# Patient Record
Sex: FEMALE | URBAN - NONMETROPOLITAN AREA
[De-identification: names, ages, dates, MRNs, and addresses within clinical notes are randomized per-mention and may not be internally consistent; named-entity substitution may affect disease eponyms.]

---

## 2017-04-05 ENCOUNTER — APPOINTMENT (RX ONLY)
Dept: URBAN - NONMETROPOLITAN AREA CLINIC 1 | Facility: CLINIC | Age: 82
Setting detail: DERMATOLOGY
End: 2017-04-05

## 2017-04-05 VITALS — WEIGHT: 125 LBS | HEIGHT: 63 IN

## 2017-04-05 DIAGNOSIS — L82.1 OTHER SEBORRHEIC KERATOSIS: ICD-10-CM

## 2017-04-05 DIAGNOSIS — B35.1 TINEA UNGUIUM: ICD-10-CM

## 2017-04-05 DIAGNOSIS — L82.0 INFLAMED SEBORRHEIC KERATOSIS: ICD-10-CM

## 2017-04-05 DIAGNOSIS — D18.0 HEMANGIOMA: ICD-10-CM

## 2017-04-05 PROBLEM — D18.01 HEMANGIOMA OF SKIN AND SUBCUTANEOUS TISSUE: Status: ACTIVE | Noted: 2017-04-05

## 2017-04-05 PROBLEM — K75.9 INFLAMMATORY LIVER DISEASE, UNSPECIFIED: Status: ACTIVE | Noted: 2017-04-05

## 2017-04-05 PROBLEM — M12.9 ARTHROPATHY, UNSPECIFIED: Status: ACTIVE | Noted: 2017-04-05

## 2017-04-05 PROBLEM — F32.9 MAJOR DEPRESSIVE DISORDER, SINGLE EPISODE, UNSPECIFIED: Status: ACTIVE | Noted: 2017-04-05

## 2017-04-05 PROBLEM — J45.909 UNSPECIFIED ASTHMA, UNCOMPLICATED: Status: ACTIVE | Noted: 2017-04-05

## 2017-04-05 PROBLEM — Z85.3 PERSONAL HISTORY OF MALIGNANT NEOPLASM OF BREAST: Status: ACTIVE | Noted: 2017-04-05

## 2017-04-05 PROCEDURE — 17111 DESTRUCTION B9 LESIONS 15/>: CPT

## 2017-04-05 PROCEDURE — ? COUNSELING

## 2017-04-05 PROCEDURE — 99203 OFFICE O/P NEW LOW 30 MIN: CPT | Mod: 25

## 2017-04-05 PROCEDURE — ? LIQUID NITROGEN

## 2017-04-05 PROCEDURE — ? PRESCRIPTION

## 2017-04-05 RX ORDER — CICLOPIROX 80 MG/ML
SOLUTION TOPICAL QHS
Qty: 6 | Refills: 3 | Status: ERX | COMMUNITY
Start: 2017-04-05

## 2017-04-05 RX ADMIN — CICLOPIROX 1 APPLICATION: 80 SOLUTION TOPICAL at 00:00

## 2017-04-05 ASSESSMENT — LOCATION DETAILED DESCRIPTION DERM
LOCATION DETAILED: RIGHT MID-UPPER BACK
LOCATION DETAILED: LEFT MID-UPPER BACK
LOCATION DETAILED: RIGHT DISTAL CALF
LOCATION DETAILED: RIGHT ANTERIOR DISTAL THIGH
LOCATION DETAILED: RIGHT INFERIOR MEDIAL MIDBACK
LOCATION DETAILED: RIGHT ANTERIOR PROXIMAL THIGH
LOCATION DETAILED: RIGHT PROXIMAL CALF
LOCATION DETAILED: RIGHT DISTAL POSTERIOR UPPER ARM
LOCATION DETAILED: LEFT ANTERIOR MEDIAL PROXIMAL THIGH
LOCATION DETAILED: LEFT ANTERIOR PROXIMAL THIGH
LOCATION DETAILED: LEFT SUPERIOR UPPER BACK
LOCATION DETAILED: LEFT PROXIMAL CALF
LOCATION DETAILED: INFERIOR LUMBAR SPINE
LOCATION DETAILED: LEFT ANTERIOR SHOULDER
LOCATION DETAILED: RIGHT GREAT TOE
LOCATION DETAILED: RIGHT ELBOW
LOCATION DETAILED: RIGHT SUPERIOR MEDIAL UPPER BACK
LOCATION DETAILED: LEFT POSTERIOR SHOULDER
LOCATION DETAILED: RIGHT PROXIMAL POSTERIOR UPPER ARM
LOCATION DETAILED: LEFT PROXIMAL POSTERIOR UPPER ARM
LOCATION DETAILED: LEFT POPLITEAL SKIN
LOCATION DETAILED: RIGHT INFERIOR UPPER BACK
LOCATION DETAILED: RIGHT INFERIOR MEDIAL UPPER BACK
LOCATION DETAILED: RIGHT PROXIMAL DORSAL FOREARM

## 2017-04-05 ASSESSMENT — LOCATION SIMPLE DESCRIPTION DERM
LOCATION SIMPLE: RIGHT ELBOW
LOCATION SIMPLE: RIGHT POSTERIOR UPPER ARM
LOCATION SIMPLE: RIGHT CALF
LOCATION SIMPLE: RIGHT FOREARM
LOCATION SIMPLE: LEFT SHOULDER
LOCATION SIMPLE: LEFT UPPER BACK
LOCATION SIMPLE: RIGHT THIGH
LOCATION SIMPLE: LEFT CALF
LOCATION SIMPLE: RIGHT UPPER BACK
LOCATION SIMPLE: LEFT POPLITEAL SKIN
LOCATION SIMPLE: LOWER BACK
LOCATION SIMPLE: RIGHT GREAT TOE
LOCATION SIMPLE: LEFT POSTERIOR UPPER ARM
LOCATION SIMPLE: RIGHT LOWER BACK
LOCATION SIMPLE: LEFT THIGH

## 2017-04-05 ASSESSMENT — LOCATION ZONE DERM
LOCATION ZONE: TRUNK
LOCATION ZONE: TOE
LOCATION ZONE: ARM
LOCATION ZONE: LEG

## 2017-04-05 NOTE — PROCEDURE: MIPS QUALITY
Detail Level: Generalized
Quality 110: Preventive Care And Screening: Influenza Immunization: Influenza Immunization previously received during influenza season
Quality 111:Pneumonia Vaccination Status For Older Adults: Pneumococcal Vaccination Administered

## 2017-04-05 NOTE — PROCEDURE: LIQUID NITROGEN
Post-Care Instructions: I reviewed with the patient in detail post-care instructions. Patient is to wear sunprotection, and avoid picking at any of the treated lesions. Pt may apply Vaseline to crusted or scabbing areas.
Include Z78.9 (Other Specified Conditions Influencing Health Status) As An Associated Diagnosis?: Yes
Medical Necessity Information: It is in your best interest to select a reason for this procedure from the list below. All of these items fulfill various CMS LCD requirements except the new and changing color options.
Number Of Freeze-Thaw Cycles: 2 freeze-thaw cycles
Detail Level: Simple
Consent: The patient's consent was obtained including but not limited to risks of crusting, scabbing, blistering, scarring, darker or lighter pigmentary change, recurrence, incomplete removal and infection.
Medical Necessity Clause: This procedure was medically necessary because the lesions that were treated were:

## 2017-04-06 RX ORDER — CICLOPIROX 80 MG/ML
SOLUTION TOPICAL QHS
Qty: 6 | Refills: 3 | Status: ERX

## 2017-12-11 PROBLEM — M48.02 CERVICAL SPINAL STENOSIS: Status: ACTIVE | Noted: 2017-12-11

## 2017-12-11 PROBLEM — M54.2 NECK PAIN: Status: ACTIVE | Noted: 2017-12-11

## 2018-01-04 ENCOUNTER — APPOINTMENT (RX ONLY)
Dept: URBAN - NONMETROPOLITAN AREA CLINIC 1 | Facility: CLINIC | Age: 83
Setting detail: DERMATOLOGY
End: 2018-01-04

## 2018-01-04 DIAGNOSIS — I87.2 VENOUS INSUFFICIENCY (CHRONIC) (PERIPHERAL): ICD-10-CM

## 2018-01-04 DIAGNOSIS — L82.0 INFLAMED SEBORRHEIC KERATOSIS: ICD-10-CM

## 2018-01-04 PROBLEM — I83.11 VARICOSE VEINS OF RIGHT LOWER EXTREMITY WITH INFLAMMATION: Status: ACTIVE | Noted: 2018-01-04

## 2018-01-04 PROBLEM — L55.1 SUNBURN OF SECOND DEGREE: Status: ACTIVE | Noted: 2018-01-04

## 2018-01-04 PROCEDURE — 99213 OFFICE O/P EST LOW 20 MIN: CPT | Mod: 25

## 2018-01-04 PROCEDURE — ? COUNSELING

## 2018-01-04 PROCEDURE — 17111 DESTRUCTION B9 LESIONS 15/>: CPT

## 2018-01-04 PROCEDURE — ? PRESCRIPTION

## 2018-01-04 PROCEDURE — ? LIQUID NITROGEN

## 2018-01-04 RX ORDER — TRIAMCINOLONE ACETONIDE 1 MG/G
CREAM TOPICAL BID
Qty: 1 | Refills: 0 | Status: ERX | COMMUNITY
Start: 2018-01-04

## 2018-01-04 RX ADMIN — TRIAMCINOLONE ACETONIDE 1 APPLICATION: 1 CREAM TOPICAL at 00:00

## 2018-01-04 ASSESSMENT — LOCATION ZONE DERM
LOCATION ZONE: LEG
LOCATION ZONE: NECK
LOCATION ZONE: TRUNK
LOCATION ZONE: FACE

## 2018-01-04 ASSESSMENT — LOCATION DETAILED DESCRIPTION DERM
LOCATION DETAILED: RIGHT PROXIMAL PRETIBIAL REGION
LOCATION DETAILED: RIGHT CENTRAL LATERAL NECK
LOCATION DETAILED: RIGHT PROXIMAL LATERAL POSTERIOR THIGH
LOCATION DETAILED: LEFT ANTERIOR PROXIMAL THIGH
LOCATION DETAILED: LEFT PROXIMAL POSTERIOR THIGH
LOCATION DETAILED: RIGHT ANTERIOR PROXIMAL THIGH
LOCATION DETAILED: LEFT SUPERIOR UPPER BACK
LOCATION DETAILED: RIGHT LATERAL UPPER BACK
LOCATION DETAILED: LEFT INFERIOR UPPER BACK
LOCATION DETAILED: LEFT DISTAL POSTERIOR THIGH
LOCATION DETAILED: RIGHT MID-UPPER BACK
LOCATION DETAILED: LEFT ANTERIOR LATERAL DISTAL THIGH
LOCATION DETAILED: RIGHT SUPERIOR LATERAL MIDBACK
LOCATION DETAILED: LEFT ANTERIOR LATERAL PROXIMAL THIGH
LOCATION DETAILED: LEFT MID-UPPER BACK
LOCATION DETAILED: LEFT LATERAL UPPER BACK
LOCATION DETAILED: RIGHT PROXIMAL CALF
LOCATION DETAILED: RIGHT SUPERIOR LATERAL NECK
LOCATION DETAILED: RIGHT ANTERIOR DISTAL THIGH
LOCATION DETAILED: RIGHT DISTAL LATERAL POSTERIOR THIGH
LOCATION DETAILED: RIGHT DISTAL CALF
LOCATION DETAILED: RIGHT PROXIMAL POSTERIOR THIGH
LOCATION DETAILED: RIGHT INFERIOR LATERAL UPPER BACK
LOCATION DETAILED: LEFT LATERAL ABDOMEN
LOCATION DETAILED: RIGHT CENTRAL TEMPLE

## 2018-01-04 ASSESSMENT — LOCATION SIMPLE DESCRIPTION DERM
LOCATION SIMPLE: ABDOMEN
LOCATION SIMPLE: RIGHT POSTERIOR THIGH
LOCATION SIMPLE: RIGHT THIGH
LOCATION SIMPLE: RIGHT TEMPLE
LOCATION SIMPLE: LEFT POSTERIOR THIGH
LOCATION SIMPLE: RIGHT PRETIBIAL REGION
LOCATION SIMPLE: LEFT THIGH
LOCATION SIMPLE: NECK
LOCATION SIMPLE: RIGHT LOWER BACK
LOCATION SIMPLE: LEFT UPPER BACK
LOCATION SIMPLE: RIGHT CALF
LOCATION SIMPLE: RIGHT UPPER BACK

## 2018-01-04 ASSESSMENT — PAIN INTENSITY VAS: HOW INTENSE IS YOUR PAIN 0 BEING NO PAIN, 10 BEING THE MOST SEVERE PAIN POSSIBLE?: 6/10 PAIN

## 2018-01-04 NOTE — PROCEDURE: LIQUID NITROGEN
Post-Care Instructions: I reviewed with the patient in detail post-care instructions. Patient is to wear sunprotection, and avoid picking at any of the treated lesions. Pt may apply Vaseline to crusted or scabbing areas.
Include Z78.9 (Other Specified Conditions Influencing Health Status) As An Associated Diagnosis?: Yes
Add 52 Modifier (Optional): no
Medical Necessity Clause: This procedure was medically necessary because the lesions that were treated were: not resolving
Medical Necessity Information: It is in your best interest to select a reason for this procedure from the list below. All of these items fulfill various CMS LCD requirements except the new and changing color options.
Number Of Freeze-Thaw Cycles: 2 freeze-thaw cycles
Detail Level: Simple
Consent: The patient's consent was obtained including but not limited to risks of crusting, scabbing, blistering, scarring, darker or lighter pigmentary change, recurrence, incomplete removal and infection.

## 2018-01-04 NOTE — HPI: BODY LOCATION - LEG
How Severe Is Your Condition?: moderate
Additional History: Patient states she went off all pain meds in November 2017 . She is using Dernamend (a  moisturizing bruise formula), a  Muscle therapy cream with arnica and Aloe Vera gel.
Year Removed: 1900

## 2018-01-23 PROBLEM — S20.219A RIB CONTUSION: Status: ACTIVE | Noted: 2018-01-23

## 2018-03-20 PROBLEM — M47.812 SPONDYLOSIS OF CERVICAL REGION WITHOUT MYELOPATHY OR RADICULOPATHY: Status: ACTIVE | Noted: 2018-03-20

## 2018-08-21 PROBLEM — M46.1 SACROILIITIS (HCC): Status: ACTIVE | Noted: 2018-08-21

## 2018-11-14 ENCOUNTER — APPOINTMENT (RX ONLY)
Dept: URBAN - METROPOLITAN AREA CLINIC 38 | Facility: CLINIC | Age: 83
Setting detail: DERMATOLOGY
End: 2018-11-14

## 2018-11-14 DIAGNOSIS — L81.4 OTHER MELANIN HYPERPIGMENTATION: ICD-10-CM

## 2018-11-14 DIAGNOSIS — I87.2 VENOUS INSUFFICIENCY (CHRONIC) (PERIPHERAL): ICD-10-CM

## 2018-11-14 PROCEDURE — ? COUNSELING

## 2018-11-14 PROCEDURE — 99203 OFFICE O/P NEW LOW 30 MIN: CPT

## 2018-11-14 RX ORDER — TRIAMCINOLONE ACETONIDE 1 MG/G
CREAM TOPICAL BID
Qty: 1 | Refills: 0 | Status: ERX

## 2018-11-14 ASSESSMENT — LOCATION DETAILED DESCRIPTION DERM
LOCATION DETAILED: LEFT DISTAL PRETIBIAL REGION
LOCATION DETAILED: LEFT PROXIMAL PRETIBIAL REGION
LOCATION DETAILED: RIGHT DISTAL PRETIBIAL REGION

## 2018-11-14 ASSESSMENT — LOCATION SIMPLE DESCRIPTION DERM
LOCATION SIMPLE: RIGHT PRETIBIAL REGION
LOCATION SIMPLE: LEFT PRETIBIAL REGION

## 2018-11-14 ASSESSMENT — LOCATION ZONE DERM: LOCATION ZONE: LEG

## 2021-01-14 DIAGNOSIS — Z23 NEED FOR VACCINATION: ICD-10-CM

## 2023-02-22 ENCOUNTER — APPOINTMENT (OUTPATIENT)
Dept: RADIOLOGY | Facility: MEDICAL CENTER | Age: 88
DRG: 871 | End: 2023-02-22
Attending: EMERGENCY MEDICINE
Payer: MEDICARE

## 2023-02-22 ENCOUNTER — HOSPITAL ENCOUNTER (INPATIENT)
Facility: MEDICAL CENTER | Age: 88
LOS: 6 days | DRG: 871 | End: 2023-02-28
Attending: EMERGENCY MEDICINE | Admitting: HOSPITALIST
Payer: MEDICARE

## 2023-02-22 DIAGNOSIS — J18.9 SEPSIS DUE TO PNEUMONIA (HCC): ICD-10-CM

## 2023-02-22 DIAGNOSIS — A41.9 SEPSIS WITHOUT ACUTE ORGAN DYSFUNCTION, DUE TO UNSPECIFIED ORGANISM (HCC): ICD-10-CM

## 2023-02-22 DIAGNOSIS — N30.00 ACUTE CYSTITIS WITHOUT HEMATURIA: ICD-10-CM

## 2023-02-22 DIAGNOSIS — A41.9 SEPSIS DUE TO PNEUMONIA (HCC): ICD-10-CM

## 2023-02-22 DIAGNOSIS — R78.81 E COLI BACTEREMIA: ICD-10-CM

## 2023-02-22 DIAGNOSIS — R41.89 COGNITIVE DECLINE: ICD-10-CM

## 2023-02-22 DIAGNOSIS — B96.20 E COLI BACTEREMIA: ICD-10-CM

## 2023-02-22 PROBLEM — R74.01 ELEVATED AST (SGOT): Status: ACTIVE | Noted: 2023-02-22

## 2023-02-22 PROBLEM — D69.6 THROMBOCYTOPENIA (HCC): Status: ACTIVE | Noted: 2023-02-22

## 2023-02-22 PROBLEM — N39.0 UTI (URINARY TRACT INFECTION): Status: ACTIVE | Noted: 2023-02-22

## 2023-02-22 PROBLEM — M54.9 CHRONIC BACK PAIN: Status: ACTIVE | Noted: 2023-02-22

## 2023-02-22 PROBLEM — G89.29 CHRONIC BACK PAIN: Status: ACTIVE | Noted: 2023-02-22

## 2023-02-22 LAB
ALBUMIN SERPL BCP-MCNC: 3.6 G/DL (ref 3.2–4.9)
ALBUMIN/GLOB SERPL: 1.2 G/DL
ALP SERPL-CCNC: 92 U/L (ref 30–99)
ALT SERPL-CCNC: 26 U/L (ref 2–50)
AMMONIA PLAS-SCNC: 16 UMOL/L (ref 11–45)
ANION GAP SERPL CALC-SCNC: 14 MMOL/L (ref 7–16)
ANISOCYTOSIS BLD QL SMEAR: ABNORMAL
APPEARANCE UR: ABNORMAL
AST SERPL-CCNC: 68 U/L (ref 12–45)
BACTERIA #/AREA URNS HPF: ABNORMAL /HPF
BASOPHILS # BLD AUTO: 0 % (ref 0–1.8)
BASOPHILS # BLD: 0 K/UL (ref 0–0.12)
BILIRUB SERPL-MCNC: 1.4 MG/DL (ref 0.1–1.5)
BILIRUB UR QL STRIP.AUTO: NEGATIVE
BUN SERPL-MCNC: 35 MG/DL (ref 8–22)
CALCIUM ALBUM COR SERPL-MCNC: 9.2 MG/DL (ref 8.5–10.5)
CALCIUM SERPL-MCNC: 8.9 MG/DL (ref 8.5–10.5)
CHLORIDE SERPL-SCNC: 105 MMOL/L (ref 96–112)
CO2 SERPL-SCNC: 23 MMOL/L (ref 20–33)
COLOR UR: ABNORMAL
CREAT SERPL-MCNC: 1.09 MG/DL (ref 0.5–1.4)
EKG IMPRESSION: NORMAL
EOSINOPHIL # BLD AUTO: 0 K/UL (ref 0–0.51)
EOSINOPHIL NFR BLD: 0 % (ref 0–6.9)
EPI CELLS #/AREA URNS HPF: ABNORMAL /HPF
ERYTHROCYTE [DISTWIDTH] IN BLOOD BY AUTOMATED COUNT: 50 FL (ref 35.9–50)
FLUAV RNA SPEC QL NAA+PROBE: NEGATIVE
FLUBV RNA SPEC QL NAA+PROBE: NEGATIVE
GFR SERPLBLD CREATININE-BSD FMLA CKD-EPI: 49 ML/MIN/1.73 M 2
GLOBULIN SER CALC-MCNC: 2.9 G/DL (ref 1.9–3.5)
GLUCOSE SERPL-MCNC: 151 MG/DL (ref 65–99)
GLUCOSE UR STRIP.AUTO-MCNC: NEGATIVE MG/DL
HCT VFR BLD AUTO: 48.2 % (ref 37–47)
HGB BLD-MCNC: 15.8 G/DL (ref 12–16)
HYALINE CASTS #/AREA URNS LPF: ABNORMAL /LPF
INR PPP: 1.47 (ref 0.87–1.13)
KETONES UR STRIP.AUTO-MCNC: ABNORMAL MG/DL
LACTATE SERPL-SCNC: 2.8 MMOL/L (ref 0.5–2)
LACTATE SERPL-SCNC: 3.5 MMOL/L (ref 0.5–2)
LACTATE SERPL-SCNC: 3.8 MMOL/L (ref 0.5–2)
LACTATE SERPL-SCNC: 3.8 MMOL/L (ref 0.5–2)
LEUKOCYTE ESTERASE UR QL STRIP.AUTO: ABNORMAL
LG PLATELETS BLD QL SMEAR: NORMAL
LYMPHOCYTES # BLD AUTO: 0.95 K/UL (ref 1–4.8)
LYMPHOCYTES NFR BLD: 6.8 % (ref 22–41)
MACROCYTES BLD QL SMEAR: ABNORMAL
MANUAL DIFF BLD: ABNORMAL
MCH RBC QN AUTO: 31.7 PG (ref 27–33)
MCHC RBC AUTO-ENTMCNC: 32.8 G/DL (ref 33.6–35)
MCV RBC AUTO: 96.6 FL (ref 81.4–97.8)
METAMYELOCYTES NFR BLD MANUAL: 0.9 %
MICRO URNS: ABNORMAL
MONOCYTES # BLD AUTO: 0.47 K/UL (ref 0–0.85)
MONOCYTES NFR BLD AUTO: 3.4 % (ref 0–13.4)
MORPHOLOGY BLD-IMP: NORMAL
NEUTROPHILS # BLD AUTO: 12.36 K/UL (ref 2–7.15)
NEUTROPHILS NFR BLD: 78.6 % (ref 44–72)
NEUTS BAND NFR BLD MANUAL: 10.3 % (ref 0–10)
NITRITE UR QL STRIP.AUTO: NEGATIVE
NRBC # BLD AUTO: 0 K/UL
NRBC BLD-RTO: 0 /100 WBC
PH UR STRIP.AUTO: 5.5 [PH] (ref 5–8)
PLATELET # BLD AUTO: 131 K/UL (ref 164–446)
PLATELET BLD QL SMEAR: NORMAL
PMV BLD AUTO: 12 FL (ref 9–12.9)
POLYCHROMASIA BLD QL SMEAR: NORMAL
POTASSIUM SERPL-SCNC: 3.7 MMOL/L (ref 3.6–5.5)
PROCALCITONIN SERPL-MCNC: 60.5 NG/ML
PROT SERPL-MCNC: 6.5 G/DL (ref 6–8.2)
PROT UR QL STRIP: 300 MG/DL
PROTHROMBIN TIME: 17.6 SEC (ref 12–14.6)
RBC # BLD AUTO: 4.99 M/UL (ref 4.2–5.4)
RBC # URNS HPF: ABNORMAL /HPF
RBC BLD AUTO: PRESENT
RBC UR QL AUTO: ABNORMAL
RSV RNA SPEC QL NAA+PROBE: NEGATIVE
SARS-COV-2 RNA RESP QL NAA+PROBE: NOTDETECTED
SODIUM SERPL-SCNC: 142 MMOL/L (ref 135–145)
SP GR UR STRIP.AUTO: 1.02
SPECIMEN SOURCE: NORMAL
UROBILINOGEN UR STRIP.AUTO-MCNC: 1 MG/DL
WBC # BLD AUTO: 13.9 K/UL (ref 4.8–10.8)
WBC #/AREA URNS HPF: ABNORMAL /HPF

## 2023-02-22 PROCEDURE — 85025 COMPLETE CBC W/AUTO DIFF WBC: CPT

## 2023-02-22 PROCEDURE — 87040 BLOOD CULTURE FOR BACTERIA: CPT | Mod: 91

## 2023-02-22 PROCEDURE — 83605 ASSAY OF LACTIC ACID: CPT | Mod: 91

## 2023-02-22 PROCEDURE — 87186 SC STD MICRODIL/AGAR DIL: CPT | Mod: 91

## 2023-02-22 PROCEDURE — 700102 HCHG RX REV CODE 250 W/ 637 OVERRIDE(OP): Performed by: HOSPITALIST

## 2023-02-22 PROCEDURE — 80053 COMPREHEN METABOLIC PANEL: CPT

## 2023-02-22 PROCEDURE — 99223 1ST HOSP IP/OBS HIGH 75: CPT | Mod: AI | Performed by: HOSPITALIST

## 2023-02-22 PROCEDURE — 770006 HCHG ROOM/CARE - MED/SURG/GYN SEMI*

## 2023-02-22 PROCEDURE — 700111 HCHG RX REV CODE 636 W/ 250 OVERRIDE (IP): Performed by: EMERGENCY MEDICINE

## 2023-02-22 PROCEDURE — 87077 CULTURE AEROBIC IDENTIFY: CPT | Mod: 91

## 2023-02-22 PROCEDURE — 82140 ASSAY OF AMMONIA: CPT

## 2023-02-22 PROCEDURE — 36415 COLL VENOUS BLD VENIPUNCTURE: CPT

## 2023-02-22 PROCEDURE — 96365 THER/PROPH/DIAG IV INF INIT: CPT

## 2023-02-22 PROCEDURE — 71045 X-RAY EXAM CHEST 1 VIEW: CPT

## 2023-02-22 PROCEDURE — 700105 HCHG RX REV CODE 258: Performed by: HOSPITALIST

## 2023-02-22 PROCEDURE — 700101 HCHG RX REV CODE 250: Performed by: EMERGENCY MEDICINE

## 2023-02-22 PROCEDURE — 84145 PROCALCITONIN (PCT): CPT

## 2023-02-22 PROCEDURE — 85007 BL SMEAR W/DIFF WBC COUNT: CPT

## 2023-02-22 PROCEDURE — 700105 HCHG RX REV CODE 258: Performed by: EMERGENCY MEDICINE

## 2023-02-22 PROCEDURE — 85610 PROTHROMBIN TIME: CPT

## 2023-02-22 PROCEDURE — 99285 EMERGENCY DEPT VISIT HI MDM: CPT

## 2023-02-22 PROCEDURE — A9270 NON-COVERED ITEM OR SERVICE: HCPCS | Performed by: HOSPITALIST

## 2023-02-22 PROCEDURE — 87086 URINE CULTURE/COLONY COUNT: CPT

## 2023-02-22 PROCEDURE — 81001 URINALYSIS AUTO W/SCOPE: CPT

## 2023-02-22 PROCEDURE — 0241U HCHG SARS-COV-2 COVID-19 NFCT DS RESP RNA 4 TRGT MIC: CPT

## 2023-02-22 PROCEDURE — 93005 ELECTROCARDIOGRAM TRACING: CPT

## 2023-02-22 PROCEDURE — C9803 HOPD COVID-19 SPEC COLLECT: HCPCS | Performed by: EMERGENCY MEDICINE

## 2023-02-22 PROCEDURE — 96375 TX/PRO/DX INJ NEW DRUG ADDON: CPT

## 2023-02-22 PROCEDURE — 700101 HCHG RX REV CODE 250: Performed by: HOSPITALIST

## 2023-02-22 PROCEDURE — 93005 ELECTROCARDIOGRAM TRACING: CPT | Performed by: EMERGENCY MEDICINE

## 2023-02-22 RX ORDER — TIZANIDINE 2 MG/1
2 TABLET ORAL
Status: ON HOLD | COMMUNITY
End: 2023-02-28

## 2023-02-22 RX ORDER — ONDANSETRON 4 MG/1
4 TABLET, ORALLY DISINTEGRATING ORAL EVERY 4 HOURS PRN
Status: DISCONTINUED | OUTPATIENT
Start: 2023-02-22 | End: 2023-02-28 | Stop reason: HOSPADM

## 2023-02-22 RX ORDER — LIDOCAINE 50 MG/G
2 PATCH TOPICAL DAILY
Status: DISCONTINUED | OUTPATIENT
Start: 2023-02-22 | End: 2023-02-28 | Stop reason: HOSPADM

## 2023-02-22 RX ORDER — SODIUM CHLORIDE, SODIUM LACTATE, POTASSIUM CHLORIDE, AND CALCIUM CHLORIDE .6; .31; .03; .02 G/100ML; G/100ML; G/100ML; G/100ML
500 INJECTION, SOLUTION INTRAVENOUS
Status: DISCONTINUED | OUTPATIENT
Start: 2023-02-22 | End: 2023-02-28

## 2023-02-22 RX ORDER — AZITHROMYCIN 500 MG/1
500 INJECTION, POWDER, LYOPHILIZED, FOR SOLUTION INTRAVENOUS ONCE
Status: DISCONTINUED | OUTPATIENT
Start: 2023-02-22 | End: 2023-02-22

## 2023-02-22 RX ORDER — ACETAMINOPHEN 325 MG/1
650 TABLET ORAL EVERY 6 HOURS PRN
Status: DISCONTINUED | OUTPATIENT
Start: 2023-02-22 | End: 2023-02-28 | Stop reason: HOSPADM

## 2023-02-22 RX ORDER — SODIUM CHLORIDE, SODIUM LACTATE, POTASSIUM CHLORIDE, CALCIUM CHLORIDE 600; 310; 30; 20 MG/100ML; MG/100ML; MG/100ML; MG/100ML
INJECTION, SOLUTION INTRAVENOUS CONTINUOUS
Status: DISCONTINUED | OUTPATIENT
Start: 2023-02-22 | End: 2023-02-26

## 2023-02-22 RX ORDER — LABETALOL HYDROCHLORIDE 5 MG/ML
10 INJECTION, SOLUTION INTRAVENOUS EVERY 4 HOURS PRN
Status: DISCONTINUED | OUTPATIENT
Start: 2023-02-22 | End: 2023-02-28 | Stop reason: HOSPADM

## 2023-02-22 RX ORDER — SODIUM CHLORIDE, SODIUM LACTATE, POTASSIUM CHLORIDE, AND CALCIUM CHLORIDE .6; .31; .03; .02 G/100ML; G/100ML; G/100ML; G/100ML
30 INJECTION, SOLUTION INTRAVENOUS ONCE
Status: ACTIVE | OUTPATIENT
Start: 2023-02-22 | End: 2023-02-23

## 2023-02-22 RX ORDER — BISACODYL 10 MG
10 SUPPOSITORY, RECTAL RECTAL
Status: DISCONTINUED | OUTPATIENT
Start: 2023-02-22 | End: 2023-02-28 | Stop reason: HOSPADM

## 2023-02-22 RX ORDER — CEFTRIAXONE 2 G/1
2000 INJECTION, POWDER, FOR SOLUTION INTRAMUSCULAR; INTRAVENOUS ONCE
Status: COMPLETED | OUTPATIENT
Start: 2023-02-22 | End: 2023-02-22

## 2023-02-22 RX ORDER — POLYETHYLENE GLYCOL 3350 17 G/17G
1 POWDER, FOR SOLUTION ORAL
Status: DISCONTINUED | OUTPATIENT
Start: 2023-02-22 | End: 2023-02-28 | Stop reason: HOSPADM

## 2023-02-22 RX ORDER — AMOXICILLIN 250 MG
2 CAPSULE ORAL 2 TIMES DAILY
Status: DISCONTINUED | OUTPATIENT
Start: 2023-02-22 | End: 2023-02-28 | Stop reason: HOSPADM

## 2023-02-22 RX ORDER — ONDANSETRON 2 MG/ML
4 INJECTION INTRAMUSCULAR; INTRAVENOUS EVERY 4 HOURS PRN
Status: DISCONTINUED | OUTPATIENT
Start: 2023-02-22 | End: 2023-02-28 | Stop reason: HOSPADM

## 2023-02-22 RX ADMIN — RIVAROXABAN 10 MG: 10 TABLET, FILM COATED ORAL at 18:12

## 2023-02-22 RX ADMIN — SENNOSIDES AND DOCUSATE SODIUM 2 TABLET: 50; 8.6 TABLET ORAL at 18:12

## 2023-02-22 RX ADMIN — CEFTRIAXONE SODIUM 2000 MG: 2 INJECTION, POWDER, FOR SOLUTION INTRAMUSCULAR; INTRAVENOUS at 13:14

## 2023-02-22 RX ADMIN — SODIUM CHLORIDE, POTASSIUM CHLORIDE, SODIUM LACTATE AND CALCIUM CHLORIDE: 600; 310; 30; 20 INJECTION, SOLUTION INTRAVENOUS at 17:24

## 2023-02-22 RX ADMIN — DOXYCYCLINE 100 MG: 100 INJECTION, POWDER, LYOPHILIZED, FOR SOLUTION INTRAVENOUS at 13:36

## 2023-02-22 RX ADMIN — LIDOCAINE 2 PATCH: 50 PATCH TOPICAL at 18:11

## 2023-02-22 ASSESSMENT — COGNITIVE AND FUNCTIONAL STATUS - GENERAL
STANDING UP FROM CHAIR USING ARMS: A LOT
DRESSING REGULAR LOWER BODY CLOTHING: A LOT
MOVING TO AND FROM BED TO CHAIR: A LITTLE
DAILY ACTIVITIY SCORE: 18
MOBILITY SCORE: 15
MOVING FROM LYING ON BACK TO SITTING ON SIDE OF FLAT BED: A LITTLE
TURNING FROM BACK TO SIDE WHILE IN FLAT BAD: A LITTLE
PERSONAL GROOMING: A LITTLE
CLIMB 3 TO 5 STEPS WITH RAILING: A LOT
SUGGESTED CMS G CODE MODIFIER MOBILITY: CK
WALKING IN HOSPITAL ROOM: A LOT
HELP NEEDED FOR BATHING: A LITTLE
TOILETING: A LITTLE
SUGGESTED CMS G CODE MODIFIER DAILY ACTIVITY: CK
DRESSING REGULAR UPPER BODY CLOTHING: A LITTLE

## 2023-02-22 ASSESSMENT — ENCOUNTER SYMPTOMS
SPEECH CHANGE: 0
COUGH: 1
NERVOUS/ANXIOUS: 0
FEVER: 0
SORE THROAT: 0
VOMITING: 1
ABDOMINAL PAIN: 1
DIARRHEA: 0
NAUSEA: 1
HEADACHES: 0

## 2023-02-22 ASSESSMENT — LIFESTYLE VARIABLES
DO YOU DRINK ALCOHOL: NO
DOES PATIENT WANT TO STOP DRINKING: NO

## 2023-02-22 NOTE — ED PROVIDER NOTES
"ED Provider Note    CHIEF COMPLAINT  Chief Complaint   Patient presents with    N/V     X 1 week    Weakness     X 1 week       EXTERNAL RECORDS REVIEWED  Other none    HPI/ROS  LIMITATION TO HISTORY   Select: Altered mental status / Confusion  OUTSIDE HISTORIAN(S):  Nursing staff/EMS who provide the majority of the history    Chava Bray is a 87 y.o. female who presents with reports of generalized weakness, nausea, and vomiting that has been ongoing but worsening for the past week.  She has reportedly been largely unable to keep any liquids down.  Today she was so weak she was unable to get out of of her bedroom and into the car so EMS was contacted to bring her to the ER.  She apparently has some confusion at baseline but this worsened today.  Per EMS she had a fever of 102 °F in route.  The patient notes that she has had a cough productive of green sputum and has some diffuse abdominal pain but denies any chest pain, shortness of breath, diarrhea, or dysuria.    PAST MEDICAL HISTORY   has a past medical history of Arthritis, Headache(784.0), Headache, classical migraine, Hyperlipidemia, Osteoporosis, unspecified, and Thyroid disease.    SURGICAL HISTORY  patient denies any surgical history    FAMILY HISTORY  Family History   Problem Relation Age of Onset    Other Neg Hx        SOCIAL HISTORY  Social History     Tobacco Use    Smoking status: Never    Smokeless tobacco: Never   Substance and Sexual Activity    Alcohol use: Yes    Drug use: No    Sexual activity: Not on file       CURRENT MEDICATIONS  Home Medications    **Home medications have not yet been reviewed for this encounter**         ALLERGIES  Allergies   Allergen Reactions    Sulfa Drugs        PHYSICAL EXAM  VITAL SIGNS: /63   Pulse (!) 106   Temp 37.6 °C (99.7 °F) (Temporal)   Resp 20   Ht 1.626 m (5' 4\")   Wt 61.2 kg (135 lb)   SpO2 94%   BMI 23.17 kg/m²    Physical Exam  Vitals and nursing note reviewed.   Constitutional:      "  Appearance: Normal appearance.   HENT:      Head: Normocephalic and atraumatic.      Right Ear: External ear normal.      Left Ear: External ear normal.      Nose: Nose normal.      Mouth/Throat:      Mouth: Mucous membranes are dry.   Eyes:      Extraocular Movements: Extraocular movements intact.      Conjunctiva/sclera: Conjunctivae normal.      Pupils: Pupils are equal, round, and reactive to light.   Cardiovascular:      Rate and Rhythm: Regular rhythm. Tachycardia present.   Pulmonary:      Effort: Pulmonary effort is normal. No respiratory distress.      Breath sounds: Normal breath sounds. No wheezing.   Abdominal:      Palpations: Abdomen is soft.      Comments: Diffusely but mildly tender without peritoneal signs.   Musculoskeletal:         General: Normal range of motion.      Cervical back: Normal range of motion and neck supple.   Skin:     General: Skin is warm and dry.   Neurological:      Mental Status: She is alert.      Comments: Awake, alert, interactive, moves all 4 extremities equally, no focal deficits   Psychiatric:         Mood and Affect: Mood normal.         Behavior: Behavior normal.     DIAGNOSTIC STUDIES / PROCEDURES  LABS  Results for orders placed or performed during the hospital encounter of 02/22/23   Lactic acid (lactate)   Result Value Ref Range    Lactic Acid 3.8 (H) 0.5 - 2.0 mmol/L   Comp Metabolic Panel   Result Value Ref Range    Sodium 142 135 - 145 mmol/L    Potassium 3.7 3.6 - 5.5 mmol/L    Chloride 105 96 - 112 mmol/L    Co2 23 20 - 33 mmol/L    Anion Gap 14.0 7.0 - 16.0    Glucose 151 (H) 65 - 99 mg/dL    Bun 35 (H) 8 - 22 mg/dL    Creatinine 1.09 0.50 - 1.40 mg/dL    Calcium 8.9 8.5 - 10.5 mg/dL    AST(SGOT) 68 (H) 12 - 45 U/L    ALT(SGPT) 26 2 - 50 U/L    Alkaline Phosphatase 92 30 - 99 U/L    Total Bilirubin 1.4 0.1 - 1.5 mg/dL    Albumin 3.6 3.2 - 4.9 g/dL    Total Protein 6.5 6.0 - 8.2 g/dL    Globulin 2.9 1.9 - 3.5 g/dL    A-G Ratio 1.2 g/dL   Urinalysis     Specimen: Urine, Clean Catch   Result Value Ref Range    Micro Urine Req Microscopic    CoV-2, FLU A/B, and RSV by PCR (2-4 Hours CEPHEID) : Collect NP swab in VTM    Specimen: Respirate   Result Value Ref Range    SARS-CoV-2 Source NP Swab    CORRECTED CALCIUM   Result Value Ref Range    Correct Calcium 9.2 8.5 - 10.5 mg/dL   ESTIMATED GFR   Result Value Ref Range    GFR (CKD-EPI) 49 (A) >60 mL/min/1.73 m 2   EKG (NOW)   Result Value Ref Range    Report       [facility]    Test Date:  2023  Pt Name:    RACHANA LINTON           Department: ER  MRN:        [id number]                      Room:       RD 04  Gender:     Female                       Technician:  :        [id number]                   Requested By:ER TRIAGE PROTOCOL  Order #:    [id number]                    Reading MD: Tien Mcgee MD    Measurements  Intervals                                Axis  Rate:       101                          P:          54  DC:         138                          QRS:        76  QRSD:       111                          T:          186  QT:         446  QTc:        579    Interpretive Statements  Sinus tachycardia  Multiform ventricular premature complexes  Probable left atrial enlargement  Borderline repolarization abnormality  Prolonged QT interval  No previous ECG available for comparison  Electronically Signed On 2023 13:33:12 PST by Tien Mcgee MD       RADIOLOGY  DX-CHEST-PORTABLE (1 VIEW)   Final Result      1.  Cardiac silhouette enlargement.   2.  Mild left basilar atelectasis.        COURSE & MEDICAL DECISION MAKING    ED Observation Status? No; Patient does not meet criteria for ED Observation.     INITIAL ASSESSMENT, COURSE AND PLAN  Care Narrative: This is an 87-year-old female who is here with nausea, vomiting, and generalized weakness that has been ongoing for the past week.  She was reportedly febrile in route by EMS to 102 °F although she did not receive any  antipyretics.  Here she is afebrile but nursing staff is only able to obtain a temporal temperature as her mucous membranes are too dry to obtain an oral temperature.  She is tachycardic with O2 sats in the low 90s on room air.  She does not use supplemental oxygen at baseline.  Abdomen is soft with some mild tenderness but no peritoneal signs.  Lungs are clear.  Patient does note that she has had a cough productive of green sputum so I will cover her with doxycycline and ceftriaxone for possible pulmonary source.  This will also cover possible urinary source as well.  She will be started on IV fluids.    CBC demonstrates leukocytosis with bandemia.  Metabolic panel demonstrates mild hyperglycemia to 151 with normal renal function.  AST is very slightly elevated to 68.  T is normal and bilirubin is normal as well.  Urinalysis suggests infection.  This will be managed with the ceftriaxone.    Given the relatively benign abdominal exam and obvious urinary source of infection I do not feel that CT imaging is indicated urgently.  Will start initially with IV fluids as well as antibiotics and if symptoms do not improve can move forward with imaging as needed.    Chest x-ray does not suggest infection or fluid overload.  Viral swabs are negative.    Patient will require hospitalization for additional management.  She was discussed with the hospitalist and admitted in guarded condition.    HYDRATION: Based on the patient's presentation of Sepsis the patient was given IV fluids. IV Hydration was used because oral hydration was not adequate alone. Upon recheck following hydration, the patient was improved.    ADDITIONAL PROBLEM LIST  None    DISPOSITION AND DISCUSSIONS  I have discussed management of the patient with the following physicians and GIRISH's: Dr. Cheema, hospitalist    Discussion of management with other [facility] or appropriate source(s): Pharmacy - Discussed changing azithromycin to doxycycline given prolonged Qtc       Escalation of care considered, and ultimately not performed: N/A.    Barriers to care at this time, including but not limited to:  None .     Decision tools and prescription drugs considered including, but not limited to: Antibiotics - ceftriaxone and doxycycline .    FINAL DIAGNOSIS  Urosepsis     Electronically signed by: Tien Mcgee M.D., 2/22/2023 12:30 PM

## 2023-02-22 NOTE — H&P
Hospital Medicine History & Physical Note    Date of Service  2023    Primary Care Physician  Francia Padilla M.D.      Code Status  DNAR/DNI    Chief Complaint  Chief Complaint   Patient presents with    N/V     X 1 week    Weakness     X 1 week       History of Presenting Illness  Chava Bray is a 87 y.o. female who presented 2023 with altered mentation with weakness and lethargy.  Per her significant other she has been having worsening mentation and weakness over the past week.  There was report of vomiting twice today as well.  Patient stated she has had abominal pain and N/V for one month.  She has some confusion but was alert to city, place but could not tell me the year or month, she thought it was December.      In the ED she was hypoxic and placed on 3L O2. She had a mild cough but CXR was unremarkable.  WBC:13.9 and tachycardic with rapid respiratory rate initially.  Urinanalysis was positive for UTI.      I discussed the plan of care with patient and family.    Review of Systems  Review of Systems   Unable to perform ROS: Mental acuity   Constitutional:  Positive for malaise/fatigue. Negative for fever.   HENT:  Negative for congestion and sore throat.    Respiratory:  Positive for cough.    Cardiovascular:  Negative for chest pain and leg swelling.   Gastrointestinal:  Positive for abdominal pain, nausea and vomiting. Negative for diarrhea.   Genitourinary:  Negative for dysuria and frequency.   Neurological:  Negative for speech change and headaches.   Psychiatric/Behavioral:  The patient is not nervous/anxious.      Past Medical History   has a past medical history of Arthritis, Headache(784.0), Headache, classical migraine, Hyperlipidemia, Osteoporosis, unspecified, and Thyroid disease.    Surgical History   has no past surgical history on file.     Family History  PARENTS AND SISTERS   Both in their 90's   Family history reviewed with patient. There is no family history that  is pertinent to the chief complaint.     Social History   reports that she has never smoked. She has never used smokeless tobacco. She reports current alcohol use. She reports that she does not use drugs.    Allergies  Allergies   Allergen Reactions    Sulfa Drugs        Medications  Prior to Admission Medications   Prescriptions Last Dose Informant Patient Reported? Taking?   tizanidine (ZANAFLEX) 2 MG tablet unk at unk  Yes Yes   Sig: Take 2 mg by mouth 1 time a day as needed.      Facility-Administered Medications: None       Physical Exam  Temp:  [36.9 °C (98.5 °F)-37.6 °C (99.7 °F)] 36.9 °C (98.5 °F)  Pulse:  [101-115] 104  Resp:  [18-43] 18  BP: (113-142)/(61-88) 139/81  SpO2:  [90 %-98 %] 90 %  Blood Pressure : 124/75   Temperature: 37.6 °C (99.7 °F)   Pulse: (!) 102   Respiration: 19   Pulse Oximetry: 97 %       Physical Exam  Vitals reviewed.   Constitutional:       Appearance: Normal appearance. She is ill-appearing. She is not diaphoretic.   HENT:      Head: Normocephalic and atraumatic.      Nose: Nose normal.      Mouth/Throat:      Mouth: Mucous membranes are moist.      Pharynx: No oropharyngeal exudate.   Eyes:      General: No scleral icterus.        Right eye: No discharge.         Left eye: No discharge.      Extraocular Movements: Extraocular movements intact.      Conjunctiva/sclera: Conjunctivae normal.   Cardiovascular:      Rate and Rhythm: Normal rate and regular rhythm.      Pulses:           Radial pulses are 2+ on the right side and 2+ on the left side.        Dorsalis pedis pulses are 2+ on the right side and 2+ on the left side.      Heart sounds: No murmur heard.  Pulmonary:      Effort: Pulmonary effort is normal. No respiratory distress.      Breath sounds: Normal breath sounds. No wheezing or rales.      Comments: Fine bibasilar crackles with resolve with deep inspiratory effort  Abdominal:      General: Bowel sounds are normal. There is no distension.      Palpations: Abdomen is  soft.      Tenderness: There is no abdominal tenderness.   Musculoskeletal:         General: No swelling or tenderness.      Cervical back: No tenderness. No muscular tenderness.      Right lower leg: No edema.      Left lower leg: No edema.   Lymphadenopathy:      Cervical: No cervical adenopathy.   Skin:     Coloration: Skin is not jaundiced or pale.   Neurological:      General: No focal deficit present.      Mental Status: She is alert and oriented to person, place, and time. Mental status is at baseline.      Cranial Nerves: No cranial nerve deficit.   Psychiatric:         Mood and Affect: Mood normal.         Behavior: Behavior normal.       Laboratory:  Recent Labs     02/22/23  1212   WBC 13.9*   RBC 4.99   HEMOGLOBIN 15.8   HEMATOCRIT 48.2*   MCV 96.6   MCH 31.7   MCHC 32.8*   RDW 50.0   PLATELETCT 131*   MPV 12.0     Recent Labs     02/22/23  1212   SODIUM 142   POTASSIUM 3.7   CHLORIDE 105   CO2 23   GLUCOSE 151*   BUN 35*   CREATININE 1.09   CALCIUM 8.9     Recent Labs     02/22/23  1212   ALTSGPT 26   ASTSGOT 68*   ALKPHOSPHAT 92   TBILIRUBIN 1.4   GLUCOSE 151*     Recent Labs     02/22/23  1212   INR 1.47*     No results for input(s): NTPROBNP in the last 72 hours.      No results for input(s): TROPONINT in the last 72 hours.    Imaging:  DX-CHEST-PORTABLE (1 VIEW)   Final Result      1.  Cardiac silhouette enlargement.   2.  Mild left basilar atelectasis.              Assessment/Plan:  Justification for Admission Status  I anticipate this patient will require at least two midnights for appropriate medical management, necessitating inpatient admission because treatment for sepsis    Patient will need a Med/Surg bed on MEDICAL service .  The need is secondary to treatment of sepsis from urinary source and rule out pneumonia as well.    * Sepsis (HCC)- (present on admission)  Assessment & Plan  This is Sepsis Present on admission  SIRS criteria identified on my evaluation include: Fever, with temperature  greater than 101 deg F, Tachycardia, with heart rate greater than 90 BPM, Leukocytosis, with WBC greater than 12,000 and Bandemia, greater than 10% bands  Source is UTI  Sepsis protocol initiated  Fluid resuscitation ordered per protocol  Crystalloid Fluid Administration: Fluid resuscitation ordered per standard protocol - 30 mL/kg per current or ideal body weight  IV antibiotics as appropriate for source of sepsis  Reassessment: I have reassessed the patient's hemodynamic status  Checking procalcitonin  Send urine for culture    Thrombocytopenia (HCC)  Assessment & Plan  2/22 Plt:131  Monitor cbc  Question of prior liver insult?    Elevated AST (SGOT)  Assessment & Plan  No alcohol use per S.O.  Monitor cmp    UTI (urinary tract infection)  Assessment & Plan  Initiated on rocephin  Monitor urine culture  Monitor I/O's, labs, vital.    Chronic back pain  Assessment & Plan  Has zanaflex at home prn per her S.O.  Avoid narcotics as able.  lidoderm patch for now if needed.    Cognitive decline  Assessment & Plan  Acutely worse per Shawn her significant other.  Monitor neurologic status  Treat sepsis and monitor.    Cervical spinal stenosis- (present on admission)  Assessment & Plan  lidoderm patch if needed.  Avoid narcotics        VTE prophylaxis: SCDs/TEDs and enoxaparin ppx

## 2023-02-23 PROBLEM — A41.9 SEPSIS DUE TO PNEUMONIA (HCC): Status: ACTIVE | Noted: 2023-02-23

## 2023-02-23 PROBLEM — R78.81 BACTEREMIA: Status: ACTIVE | Noted: 2023-02-23

## 2023-02-23 PROBLEM — E87.20 LACTIC ACIDOSIS: Status: ACTIVE | Noted: 2023-02-23

## 2023-02-23 PROBLEM — J18.9 SEPSIS DUE TO PNEUMONIA (HCC): Status: ACTIVE | Noted: 2023-02-23

## 2023-02-23 PROBLEM — R94.31 PROLONGED Q-T INTERVAL ON ECG: Status: ACTIVE | Noted: 2023-02-23

## 2023-02-23 LAB
ANION GAP SERPL CALC-SCNC: 13 MMOL/L (ref 7–16)
BUN SERPL-MCNC: 40 MG/DL (ref 8–22)
CALCIUM SERPL-MCNC: 9.1 MG/DL (ref 8.5–10.5)
CHLORIDE SERPL-SCNC: 106 MMOL/L (ref 96–112)
CO2 SERPL-SCNC: 24 MMOL/L (ref 20–33)
CREAT SERPL-MCNC: 0.94 MG/DL (ref 0.5–1.4)
ERYTHROCYTE [DISTWIDTH] IN BLOOD BY AUTOMATED COUNT: 50.5 FL (ref 35.9–50)
GFR SERPLBLD CREATININE-BSD FMLA CKD-EPI: 59 ML/MIN/1.73 M 2
GLUCOSE SERPL-MCNC: 137 MG/DL (ref 65–99)
HCT VFR BLD AUTO: 45.6 % (ref 37–47)
HGB BLD-MCNC: 14.6 G/DL (ref 12–16)
LACTATE SERPL-SCNC: 2.8 MMOL/L (ref 0.5–2)
LACTATE SERPL-SCNC: 3.1 MMOL/L (ref 0.5–2)
MCH RBC QN AUTO: 31.2 PG (ref 27–33)
MCHC RBC AUTO-ENTMCNC: 32 G/DL (ref 33.6–35)
MCV RBC AUTO: 97.4 FL (ref 81.4–97.8)
PLATELET # BLD AUTO: 112 K/UL (ref 164–446)
PMV BLD AUTO: 12.4 FL (ref 9–12.9)
POTASSIUM SERPL-SCNC: 3.6 MMOL/L (ref 3.6–5.5)
RBC # BLD AUTO: 4.68 M/UL (ref 4.2–5.4)
SODIUM SERPL-SCNC: 143 MMOL/L (ref 135–145)
WBC # BLD AUTO: 10.9 K/UL (ref 4.8–10.8)

## 2023-02-23 PROCEDURE — A9270 NON-COVERED ITEM OR SERVICE: HCPCS | Performed by: HOSPITALIST

## 2023-02-23 PROCEDURE — 85027 COMPLETE CBC AUTOMATED: CPT

## 2023-02-23 PROCEDURE — 83605 ASSAY OF LACTIC ACID: CPT | Mod: 91

## 2023-02-23 PROCEDURE — 770006 HCHG ROOM/CARE - MED/SURG/GYN SEMI*

## 2023-02-23 PROCEDURE — 700101 HCHG RX REV CODE 250: Performed by: HOSPITALIST

## 2023-02-23 PROCEDURE — 80048 BASIC METABOLIC PNL TOTAL CA: CPT

## 2023-02-23 PROCEDURE — 700105 HCHG RX REV CODE 258

## 2023-02-23 PROCEDURE — 99232 SBSQ HOSP IP/OBS MODERATE 35: CPT | Performed by: HOSPITALIST

## 2023-02-23 PROCEDURE — 700102 HCHG RX REV CODE 250 W/ 637 OVERRIDE(OP): Performed by: HOSPITALIST

## 2023-02-23 PROCEDURE — 700111 HCHG RX REV CODE 636 W/ 250 OVERRIDE (IP): Performed by: HOSPITALIST

## 2023-02-23 PROCEDURE — 92610 EVALUATE SWALLOWING FUNCTION: CPT

## 2023-02-23 PROCEDURE — 36415 COLL VENOUS BLD VENIPUNCTURE: CPT

## 2023-02-23 RX ORDER — GUAIFENESIN/DEXTROMETHORPHAN 100-10MG/5
5 SYRUP ORAL EVERY 6 HOURS PRN
Status: DISCONTINUED | OUTPATIENT
Start: 2023-02-23 | End: 2023-02-28 | Stop reason: HOSPADM

## 2023-02-23 RX ORDER — AZITHROMYCIN 250 MG/1
500 TABLET, FILM COATED ORAL DAILY
Status: DISCONTINUED | OUTPATIENT
Start: 2023-02-23 | End: 2023-02-23

## 2023-02-23 RX ORDER — DOXYCYCLINE 100 MG/1
100 TABLET ORAL EVERY 12 HOURS
Status: COMPLETED | OUTPATIENT
Start: 2023-02-23 | End: 2023-02-28

## 2023-02-23 RX ADMIN — DOXYCYCLINE 100 MG: 100 TABLET, FILM COATED ORAL at 13:11

## 2023-02-23 RX ADMIN — SODIUM CHLORIDE, POTASSIUM CHLORIDE, SODIUM LACTATE AND CALCIUM CHLORIDE: 600; 310; 30; 20 INJECTION, SOLUTION INTRAVENOUS at 08:01

## 2023-02-23 RX ADMIN — LIDOCAINE 2 PATCH: 50 PATCH TOPICAL at 18:07

## 2023-02-23 RX ADMIN — CEFTRIAXONE SODIUM 2000 MG: 10 INJECTION, POWDER, FOR SOLUTION INTRAVENOUS at 13:11

## 2023-02-23 RX ADMIN — GUAIFENESIN SYRUP AND DEXTROMETHORPHAN 5 ML: 100; 10 SYRUP ORAL at 22:33

## 2023-02-23 RX ADMIN — RIVAROXABAN 10 MG: 10 TABLET, FILM COATED ORAL at 18:07

## 2023-02-23 RX ADMIN — SENNOSIDES AND DOCUSATE SODIUM 2 TABLET: 50; 8.6 TABLET ORAL at 04:25

## 2023-02-23 RX ADMIN — SENNOSIDES AND DOCUSATE SODIUM 2 TABLET: 50; 8.6 TABLET ORAL at 18:07

## 2023-02-23 ASSESSMENT — PAIN DESCRIPTION - PAIN TYPE
TYPE: ACUTE PAIN
TYPE: ACUTE PAIN

## 2023-02-23 NOTE — PROGRESS NOTES
Patient is disoriented she is oriented times her self only. She remains disoriented unable to do most of her assessment due to disorientation. Unable to answer most questions. She cannot states if she is suicidial or not. Report given to new nurse/

## 2023-02-23 NOTE — PROGRESS NOTES
Izzy from Lab called with critical result of procalcitonin of 60.50 at 21:42. Critical lab result read back to Izzy.   On-call provider, Lucy Salgado, notified of critical lab result at 21:45.  Critical lab result read back by Damian.

## 2023-02-23 NOTE — PROGRESS NOTES
Hospital Medicine Daily Progress Note    Date of Service  2/23/2023    Chief Complaint  Chava Bray is a 87 y.o. female admitted 2/22/2023 with confusion    Hospital Course  No notes on file    Interval Problem Update  Treating UTI and pneumonia    Patient currently hypoxic -on 4 L of oxygen satting at 99%    Currently on IV Rocephin    Patient is alert oriented to person and place but not to time    She still exhibits periods of confusion    Lactic acid level still elevated at 2.8    Markedly elevated procalcitonin level of 60    I have discussed this patient's plan of care and discharge plan at IDT rounds today with Case Management, Nursing, Nursing leadership, and other members of the IDT team.    Consultants/Specialty      Code Status  DNAR/DNI    Disposition  Patient is not medically cleared for discharge.   Anticipate discharge to to home with close outpatient follow-up.  I have placed the appropriate orders for post-discharge needs.    Review of Systems  Review of Systems   Unable to perform ROS: Mental status change      Physical Exam  Temp:  [35.9 °C (96.7 °F)-36.9 °C (98.5 °F)] 36.4 °C (97.6 °F)  Pulse:  [] 49  Resp:  [16-20] 16  BP: (112-144)/(56-81) 137/56  SpO2:  [90 %-99 %] 99 %    Physical Exam  Constitutional:       General: She is not in acute distress.     Appearance: She is ill-appearing. She is not toxic-appearing.   Eyes:      General:         Left eye: No discharge.      Extraocular Movements: Extraocular movements intact.      Pupils: Pupils are equal, round, and reactive to light.   Cardiovascular:      Rate and Rhythm: Normal rate and regular rhythm.      Pulses: Normal pulses.   Pulmonary:      Effort: No respiratory distress.      Breath sounds: No stridor. No wheezing, rhonchi or rales.   Chest:      Chest wall: No tenderness.   Abdominal:      General: Abdomen is flat. There is no distension.      Tenderness: There is no abdominal tenderness. There is no guarding.    Musculoskeletal:         General: No swelling, tenderness or deformity. Normal range of motion.      Cervical back: Normal range of motion.      Right lower leg: No edema.   Skin:     General: Skin is warm.      Capillary Refill: Capillary refill takes 2 to 3 seconds.      Coloration: Skin is not jaundiced.      Findings: No bruising or lesion.   Neurological:      General: No focal deficit present.      Motor: Weakness present.      Comments: Periods of confusion   Psychiatric:         Mood and Affect: Mood normal.       Fluids    Intake/Output Summary (Last 24 hours) at 2/23/2023 1231  Last data filed at 2/23/2023 1000  Gross per 24 hour   Intake 60 ml   Output --   Net 60 ml       Laboratory  Recent Labs     02/22/23  1212 02/23/23  0306   WBC 13.9* 10.9*   RBC 4.99 4.68   HEMOGLOBIN 15.8 14.6   HEMATOCRIT 48.2* 45.6   MCV 96.6 97.4   MCH 31.7 31.2   MCHC 32.8* 32.0*   RDW 50.0 50.5*   PLATELETCT 131* 112*   MPV 12.0 12.4     Recent Labs     02/22/23  1212 02/23/23  0306   SODIUM 142 143   POTASSIUM 3.7 3.6   CHLORIDE 105 106   CO2 23 24   GLUCOSE 151* 137*   BUN 35* 40*   CREATININE 1.09 0.94   CALCIUM 8.9 9.1     Recent Labs     02/22/23  1212   INR 1.47*               Imaging  DX-CHEST-PORTABLE (1 VIEW)   Final Result      1.  Cardiac silhouette enlargement.   2.  Mild left basilar atelectasis.           Assessment/Plan  * Sepsis (HCC)- (present on admission)  Assessment & Plan  This is Sepsis Present on admission  SIRS criteria identified on my evaluation include: Fever, with temperature greater than 101 deg F, Tachycardia, with heart rate greater than 90 BPM, Leukocytosis, with WBC greater than 12,000 and Bandemia, greater than 10% bands  Source is UTI and lung  Sepsis protocol initiated  Fluid resuscitation ordered per protocol  Crystalloid Fluid Administration: Fluid resuscitation ordered per standard protocol - 30 mL/kg per current or ideal body weight  IV antibiotics as appropriate for source of  sepsis  Reassessment: I have reassessed the patient's hemodynamic status    Send urine for culture    Sputum culture if possible    IV Rocephin and p.o. doxycycline    Prolonged Q-T interval on ECG- (present on admission)  Assessment & Plan  Avoid QT prolonging drugs    Lactic acidosis- (present on admission)  Assessment & Plan  Due to sepsis.  IV fluids  Monitor vitals  And repeat labs    Thrombocytopenia (HCC)  Assessment & Plan  2/22 Plt:131  Monitor cbc      Elevated AST (SGOT)- (present on admission)  Assessment & Plan  No alcohol use per S.O.  Monitor cmp    UTI (urinary tract infection)  Assessment & Plan  Initiated on rocephin  Monitor urine culture  Monitor I/O's, labs, vital.    Chronic back pain- (present on admission)  Assessment & Plan  Has zanaflex at home prn per her S.O.  Avoid narcotics as able.  lidoderm patch for now if needed.    Cognitive decline- (present on admission)  Assessment & Plan  Acutely worse per Shawn her significant other.  Monitor neurologic status  Treat sepsis and monitor.    Cervical spinal stenosis- (present on admission)  Assessment & Plan  lidoderm patch if needed.  Avoid narcotics         VTE prophylaxis: SCDs/TEDs    I have performed a physical exam and reviewed and updated ROS and Plan today (2/23/2023). In review of yesterday's note (2/22/2023), there are no changes except as documented above.

## 2023-02-23 NOTE — DOCUMENTATION QUERY
[facility]                                                                       Query Response Note      PATIENT:               RACHANA LINTON  ACCT #:                  [id number]  MRN:                     [id number]  :                      1935  ADMIT DATE:       2023 11:59 AM  DISCH DATE:          RESPONDING  PROVIDER #:        [id number]           QUERY TEXT:    Lactic acid 3.8 is noted in the  Lab Results.  Can a diagnosis be provided to support this finding?    The patient's clinical indicators include:   Lactic acid 3.8, 3.5, 3.8, 2.8  Risk Factors: sepsis  Treatment: IVF, trend lactic acid    Thank you,  Tina Greenberg RN, BSN  Clinical   Connect via [employer]  Options provided:   -- Lactic acidosis   -- Other explanation, please specify   -- Finding of no clinical significance   -- Unable to determine      Query created by: Tina Greenberg on 2023 6:38 AM    RESPONSE TEXT:    Lactic acidosis          Electronically signed by:  VICTORIANO RHODES DO 2023 8:06 AM

## 2023-02-23 NOTE — PROGRESS NOTES
Cat from Lab called with critical result of 1/2 blood cultures positive for gram negative rods at 21:56. Critical lab result read back to Cat.   On-call provider, Jaspal Walsh, notified of critical lab result at 22:00.  Critical lab result read back by Nico.

## 2023-02-23 NOTE — PROGRESS NOTES
Shaq from Lab called with critical result of positive peds blood cultures for gram negative rods at 01:26. Critical lab result read back to Shaq.   On-call provider, Jaspal Walsh, notified of critical lab result at 01:32.  Critical lab result read back by Nico.

## 2023-02-23 NOTE — THERAPY
Speech Language Pathology   Clinical Swallow Evaluation     Patient Name: Chava Bray  AGE:  87 y.o., SEX:  female  Medical Record #: [id number]  Today's Date: 2023          Assessment    HPI: 87 y.o. female admitted 2023 with AMS, weakness and lethargy.     PMHx Arthritis, headache, migraine, hyperlipedemia, osteoporosis, thyroid disease.   No previous hx of SLP services at [facility].    CMHx Sepsis, lactic acidosis, UTI, cognitive decline, cervical spinal stenosis    CXR :  Mild left basilar opacity likely atelectasis. Correlate clinically for infection. Right lung is clear. No pleural effusion or pneumothorax.          Level of Consciousness: Awake  Affect/Behavior: Calm, pleasantly confused  Follows Directives: Yes - simple commands only  Orientation: Self, , General place  Hearing: Functional hearing  Vision: Functional vision      Prior Living Situation & Level of Function:  Per chart review, patient lives with her SO of 30yrs matilde 2-story home.       Oral Mechanism Evaluation  Facial Symmetry: Equal  Facial Sensation: Pt did not follow commands to assess  Labial Observations: WFL  Lingual Observations: Midline  Dentition: Some missing dentition  Comments:      Voice  Quality: WFL  Resonance: WFL  Intensity: Appropriate  Cough: WFL  Comments:      Current Method of Nutrition   Oral diet (EC7/TN0)      Subjective  Patient reports consuming regular foods at baseline, preferring smaller meals.        Assessment  Positioning: Boykin's (60-90 degrees)  Bolus Administration: SLP and Patient  Oxygen Requirements:  4 L Nasal Cannula  Factor(s) Affecting Performance: Impaired endurance, Impaired mental status, Impaired command following    Swallowing Trials  Ice: WFL  Thin Liquid (TN0): WFL  Liquidised (LQ3): WFL  Soft & Bite-Sized (SB6): WFL    Comments:  Patient needing feeding A d/t AMS. Patient self-feeding with slow movement and incoordination, needing verbal cues to appropriately use spoon and  "sip from straw. Patient would benefit from 1:1 feeding assistance as needed with direct supervision during meals and assistance with meal tray set up. Thin liquids via cup and straw. Adequate oral bolus acceptance/containment and transit. Pharyngeal swallow response appeared timely. Liquid wash x2 to clear oral residue with trials of soft/bite sized. Single swallow completed per bolus. Baseline cough did not appear exacerbated by PO. Vocal quality remained clear. Pt declined trials of regular solids.     Clinical Impressions  Patient presents with mild oral dysphagia suspect d/t AMS. Baseline cough not exacerbated by PO with no signs of pharyngeal inefficiency. Short-term diet modification is indicated. Patient will benefit from follow up with service to maximize swallow outcomes.       Recommendations  1.  Soft/bite sized solids and thin liquids  2.  Instrumentation: None indicated at this time, Instrumental swallow study pending clinical progress  3.  Swallowing Instructions & Precautions:   Supervision: 1:1 feeding with constant supervision, Encourage self-feeding  Positioning: Fully upright and midline during oral intake  Medication: As tolerated  Strategies: Small bites/sips, Slow rate of intake  Oral Care: BID  4.  HOLD PO with any difficulty and contact SLP     Plan    Recommend Speech Therapy 3 times per week until therapy goals are met for the following treatments:  Dysphagia Training and Patient / Family / Caregiver Education.            Objective       02/23/23 1521   Patient / Family Goals   Patient / Family Goal #1 \"I like the applesauce\"   Short Term Goals   Short Term Goal # 1 Patient will consume a diet of soft/bite sized solids and thin liquids with no overt s/sx of aspiration with 1:1 feeding A       "

## 2023-02-23 NOTE — DISCHARGE PLANNING
Care Transition Team Assessment    CM attempted to complete CTTA with Crystal but too confused. CM placed call to Shawn on emergency contact. Time shared that they are life partners of 30 years. They live in a 2 story home with no steps to enter from the garage. Georgia has 17 steps to get to her Master suite and he stays in the main level bedroom. 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 Their neighbors keep watch over Georgia when he is gone. The neighbors will be going to [employer] soon for a few weeks. He only has one more trip south the 3rd week in March as the business has sold and then he will not have to leave her alone any more. PTA Georgia was independent, driving to [employer] and the post office. When he is home he does the cooking, laundry and cleaning. He reported that Georgia has been having cughing fits for the past 3 weeks lasting 1/2 hr to 1 hr. CM updated provider. She does not have home oxygen. Georgia was given a cane by her brother that stays in the car and he has never seen her use it. Georgia has no history of HHC or SNF. Shawn will be able to transport Georgia home weather depending.     CM requested PT to evaluate from provider. CM will continue to follow.     Information Source  Orientation Level: Oriented to place, Oriented to person  Information Given By: Patient    Readmission Evaluation  Is this a readmission?: No    Elopement Risk  Legal Hold: Elopement Risk  Ambulatory or Self Mobile in Wheelchair: Yes  Disoriented: Time-At Risk for Elopement, Situation-At Risk for Elopement  Elopement Risk: Not at Risk for Elopement    Interdisciplinary Discharge Planning  Does Admitting Nurse Feel This Could be a Complex Discharge?: No  Primary Care Physician: Francia Padilla M.D.  Lives with - Patient's Self Care Capacity: Significant Other (Shawn)  Patient or legal guardian wants to designate a caregiver: No  Support Systems: Spouse /  Significant Other, Friends / Neighbors  Housing / Facility: 2 Story House (no steps to enter)  Do You Take your Prescribed Medications Regularly: Yes  Able to Return to Previous ADL's: Future Time w/Therapy  Mobility Issues: No  Prior Services: None  Patient Prefers to be Discharged to:: Home  Assistance Needed: Unknown at this Time  Durable Medical Equipment: Other - Specify (Cane)    Discharge Preparedness  What is your plan after discharge?: Home with help  What are your discharge supports?: Other (comment) (Life Partner Shawn)  Prior Functional Level: Ambulatory, Drives Self, Independent with Activities of Daily Living, Independent with Medication Management  Difficulity with ADLs: None  Difficulity with IADLs: None    Functional Assesment  Prior Functional Level: Ambulatory, Drives Self, Independent with Activities of Daily Living, Independent with Medication Management    Finances  Financial Barriers to Discharge: No  Prescription Coverage: Yes    Vision / Hearing Impairment  Vision Impairment : No  Hearing Impairment : No              Domestic Abuse  Have you ever been the victim of abuse or violence?: Not Sure  Physical Abuse or Sexual Abuse: Unable to Assess due to Patient Condition  Verbal Abuse or Emotional Abuse: Unable to Assess due to Patient Condition         Discharge Risks or Barriers  Discharge risks or barriers?: No    Anticipated Discharge Information  Discharge Disposition: Discharged to home/self care (01)

## 2023-02-23 NOTE — PROGRESS NOTES
Patient is resting in bed at this time, slow nonlabored breathing with no signs of distress at this time. She is cam and even nonlabored but arousable..

## 2023-02-23 NOTE — PROGRESS NOTES
4 Eyes Skin Assessment Completed by GEOVANNY josé and GEOVANNY hylton.    Head WDL  Ears WDL  Nose WDL  Mouth WDL  Neck WDL  Breast/Chest WDL  Shoulder Blades WDL  Spine WDL  (R) Arm/Elbow/Hand Bruising  (L) Arm/Elbow/Hand WDL  Abdomen Bruising  Groin WDL  Scrotum/Coccyx/Buttocks Blanching, redness  (R) Leg Bruising  (L) Leg Bruising  (R) Heel/Foot/Toe Redness and Blanching  (L) Heel/Foot/Toe Redness and Blanching          Devices In Places n/a        Interventions In Place N/A    Possible Skin Injury No    Pictures Uploaded Into Epic N/A  Wound Consult Placed N/A  RN Wound Prevention Protocol Ordered No

## 2023-02-23 NOTE — ASSESSMENT & PLAN NOTE
Has zanaflex at home prn per her S.O.  Avoid narcotics as able.  lidoderm patch for now if needed.

## 2023-02-23 NOTE — CARE PLAN
The patient is Watcher - Medium risk of patient condition declining or worsening    Shift Goals  Clinical Goals: Monitor for s/s of infection, safety  Patient Goals: Rest and comfort  Family Goals: NA      Problem: Knowledge Deficit - Standard  Goal: Patient and family/care givers will demonstrate understanding of plan of care, disease process/condition, diagnostic tests and medications  Outcome: Progressing     Problem: Skin Integrity  Goal: Skin integrity is maintained or improved  Outcome: Progressing       Progress made toward(s) clinical / shift goals:  Patient updated on the plan of care. All questions answered. Skin assessed. Medicated per MAR. Care ongoing.

## 2023-02-23 NOTE — ASSESSMENT & PLAN NOTE
With evidence of bacteremia with E. Coli-treatment with antibiotics for total of 7 days-today 2/27/2023 is day #5 of 7  Monitor urine culture  Monitor I/O's, labs, vital.   fatigue and slow HR

## 2023-02-23 NOTE — PROGRESS NOTES
NOC HOSPITALIST CROSS COVER    Notified by RN regarding 1 out of 2 positive blood cultures.  Per RN blood cultures positive for gram-negative rods.  Patient initiated on Rocephin in emergency department and continued during this admission.  Patient has an admitting diagnosis of sepsis as well as urinary tract infection.  We will continue with Rocephin at this time.    A/P:  #Gram-negative bobbi bacteremia  -Continue Rocephin        -----------------------------------------------------------------------------------------------------------    Electronically signed by:  JACOBO Walsh PA-C  Hospitalist Services

## 2023-02-23 NOTE — ASSESSMENT & PLAN NOTE
Acutely worse per Shawn her significant other.  Monitor neurologic status  Treat sepsis and monitor.

## 2023-02-24 LAB
ALBUMIN SERPL BCP-MCNC: 2.8 G/DL (ref 3.2–4.9)
ALBUMIN/GLOB SERPL: 0.9 G/DL
ALP SERPL-CCNC: 111 U/L (ref 30–99)
ALT SERPL-CCNC: 31 U/L (ref 2–50)
ANION GAP SERPL CALC-SCNC: 14 MMOL/L (ref 7–16)
AST SERPL-CCNC: 44 U/L (ref 12–45)
BILIRUB SERPL-MCNC: 1 MG/DL (ref 0.1–1.5)
BUN SERPL-MCNC: 42 MG/DL (ref 8–22)
CALCIUM ALBUM COR SERPL-MCNC: 10.3 MG/DL (ref 8.5–10.5)
CALCIUM SERPL-MCNC: 9.3 MG/DL (ref 8.5–10.5)
CHLORIDE SERPL-SCNC: 106 MMOL/L (ref 96–112)
CO2 SERPL-SCNC: 21 MMOL/L (ref 20–33)
CREAT SERPL-MCNC: 0.77 MG/DL (ref 0.5–1.4)
ERYTHROCYTE [DISTWIDTH] IN BLOOD BY AUTOMATED COUNT: 49.5 FL (ref 35.9–50)
GFR SERPLBLD CREATININE-BSD FMLA CKD-EPI: 75 ML/MIN/1.73 M 2
GLOBULIN SER CALC-MCNC: 3.1 G/DL (ref 1.9–3.5)
GLUCOSE SERPL-MCNC: 124 MG/DL (ref 65–99)
HCT VFR BLD AUTO: 44.3 % (ref 37–47)
HGB BLD-MCNC: 14.6 G/DL (ref 12–16)
LACTATE SERPL-SCNC: 2.8 MMOL/L (ref 0.5–2)
MCH RBC QN AUTO: 31.5 PG (ref 27–33)
MCHC RBC AUTO-ENTMCNC: 33 G/DL (ref 33.6–35)
MCV RBC AUTO: 95.5 FL (ref 81.4–97.8)
PLATELET # BLD AUTO: 105 K/UL (ref 164–446)
PMV BLD AUTO: 12.2 FL (ref 9–12.9)
POTASSIUM SERPL-SCNC: 4.1 MMOL/L (ref 3.6–5.5)
PROT SERPL-MCNC: 5.9 G/DL (ref 6–8.2)
RBC # BLD AUTO: 4.64 M/UL (ref 4.2–5.4)
SODIUM SERPL-SCNC: 141 MMOL/L (ref 135–145)
WBC # BLD AUTO: 12.5 K/UL (ref 4.8–10.8)

## 2023-02-24 PROCEDURE — 80053 COMPREHEN METABOLIC PANEL: CPT

## 2023-02-24 PROCEDURE — 83605 ASSAY OF LACTIC ACID: CPT

## 2023-02-24 PROCEDURE — 92526 ORAL FUNCTION THERAPY: CPT

## 2023-02-24 PROCEDURE — 700105 HCHG RX REV CODE 258: Performed by: HOSPITALIST

## 2023-02-24 PROCEDURE — A9270 NON-COVERED ITEM OR SERVICE: HCPCS | Performed by: HOSPITALIST

## 2023-02-24 PROCEDURE — 700101 HCHG RX REV CODE 250: Performed by: HOSPITALIST

## 2023-02-24 PROCEDURE — 36415 COLL VENOUS BLD VENIPUNCTURE: CPT

## 2023-02-24 PROCEDURE — 85027 COMPLETE CBC AUTOMATED: CPT

## 2023-02-24 PROCEDURE — 97162 PT EVAL MOD COMPLEX 30 MIN: CPT

## 2023-02-24 PROCEDURE — 700111 HCHG RX REV CODE 636 W/ 250 OVERRIDE (IP): Performed by: HOSPITALIST

## 2023-02-24 PROCEDURE — 770006 HCHG ROOM/CARE - MED/SURG/GYN SEMI*

## 2023-02-24 PROCEDURE — 99232 SBSQ HOSP IP/OBS MODERATE 35: CPT | Performed by: HOSPITALIST

## 2023-02-24 PROCEDURE — 700102 HCHG RX REV CODE 250 W/ 637 OVERRIDE(OP): Performed by: HOSPITALIST

## 2023-02-24 RX ORDER — SODIUM CHLORIDE 9 MG/ML
500 INJECTION, SOLUTION INTRAVENOUS ONCE
Status: COMPLETED | OUTPATIENT
Start: 2023-02-24 | End: 2023-02-24

## 2023-02-24 RX ADMIN — RIVAROXABAN 10 MG: 10 TABLET, FILM COATED ORAL at 18:29

## 2023-02-24 RX ADMIN — DOXYCYCLINE 100 MG: 100 TABLET, FILM COATED ORAL at 18:29

## 2023-02-24 RX ADMIN — ACETAMINOPHEN 650 MG: 325 TABLET, FILM COATED ORAL at 00:21

## 2023-02-24 RX ADMIN — CEFTRIAXONE SODIUM 2000 MG: 10 INJECTION, POWDER, FOR SOLUTION INTRAVENOUS at 13:30

## 2023-02-24 RX ADMIN — GUAIFENESIN SYRUP AND DEXTROMETHORPHAN 5 ML: 100; 10 SYRUP ORAL at 20:30

## 2023-02-24 RX ADMIN — SENNOSIDES AND DOCUSATE SODIUM 2 TABLET: 50; 8.6 TABLET ORAL at 04:13

## 2023-02-24 RX ADMIN — SODIUM CHLORIDE, POTASSIUM CHLORIDE, SODIUM LACTATE AND CALCIUM CHLORIDE: 600; 310; 30; 20 INJECTION, SOLUTION INTRAVENOUS at 20:32

## 2023-02-24 RX ADMIN — SODIUM CHLORIDE 500 ML: 9 INJECTION, SOLUTION INTRAVENOUS at 07:52

## 2023-02-24 RX ADMIN — DOXYCYCLINE 100 MG: 100 TABLET, FILM COATED ORAL at 04:13

## 2023-02-24 RX ADMIN — SENNOSIDES AND DOCUSATE SODIUM 2 TABLET: 50; 8.6 TABLET ORAL at 18:29

## 2023-02-24 ASSESSMENT — COGNITIVE AND FUNCTIONAL STATUS - GENERAL
MOVING FROM LYING ON BACK TO SITTING ON SIDE OF FLAT BED: UNABLE
STANDING UP FROM CHAIR USING ARMS: A LOT
CLIMB 3 TO 5 STEPS WITH RAILING: TOTAL
MOVING TO AND FROM BED TO CHAIR: A LITTLE
SUGGESTED CMS G CODE MODIFIER MOBILITY: CL
MOBILITY SCORE: 11
TURNING FROM BACK TO SIDE WHILE IN FLAT BAD: A LITTLE
WALKING IN HOSPITAL ROOM: TOTAL

## 2023-02-24 ASSESSMENT — PAIN DESCRIPTION - PAIN TYPE: TYPE: ACUTE PAIN

## 2023-02-24 ASSESSMENT — GAIT ASSESSMENTS: GAIT LEVEL OF ASSIST: UNABLE TO PARTICIPATE

## 2023-02-24 ASSESSMENT — FIBROSIS 4 INDEX: FIB4 SCORE: 6.47

## 2023-02-24 NOTE — CARE PLAN
The patient is Stable - Low risk of patient condition declining or worsening    Shift Goals  Clinical Goals: VSS, wean O2, monitor for s/s of infection, safety  Patient Goals: Rest and comfort  Family Goals: n/v      Problem: Knowledge Deficit - Standard  Goal: Patient and family/care givers will demonstrate understanding of plan of care, disease process/condition, diagnostic tests and medications  Outcome: Progressing     Problem: Hemodynamics  Goal: Patient's hemodynamics, fluid balance and neurologic status will be stable or improve  Outcome: Progressing     Problem: Skin Integrity  Goal: Skin integrity is maintained or improved  Outcome: Progressing       Progress made toward(s) clinical / shift goals:  Patient updated on the plan of care. All questions answered. Skin assessed. Medicated per MAR. Care ongoing.

## 2023-02-24 NOTE — PROGRESS NOTES
Hospital Medicine Daily Progress Note    Date of Service  2/24/2023    Chief Complaint  Chava Bray is a 87 y.o. female admitted 2/22/2023 with confusion    Hospital Course  No notes on file    Interval Problem Update  Treating UTI and pneumonia    Patient currently hypoxic -on 4 L of oxygen satting at 100%    Currently on IV Rocephin, po doxy      Still intermittently confused    Lactic acid level still elevated at 2.8      I have discussed this patient's plan of care and discharge plan at IDT rounds today with Case Management, Nursing, Nursing leadership, and other members of the IDT team.    Consultants/Specialty      Code Status  DNAR/DNI    Disposition  Patient is not medically cleared for discharge.   Anticipate discharge to to home with close outpatient follow-up.  I have placed the appropriate orders for post-discharge needs.    Review of Systems  Review of Systems   Unable to perform ROS: Mental status change      Physical Exam  Temp:  [36.3 °C (97.3 °F)-36.6 °C (97.8 °F)] 36.3 °C (97.3 °F)  Pulse:  [] 100  Resp:  [16] 16  BP: (110-156)/(62-79) 156/79  SpO2:  [96 %-100 %] 100 %    Physical Exam  Constitutional:       General: She is not in acute distress.     Appearance: She is ill-appearing. She is not toxic-appearing.   Eyes:      General:         Left eye: No discharge.      Extraocular Movements: Extraocular movements intact.      Pupils: Pupils are equal, round, and reactive to light.   Cardiovascular:      Rate and Rhythm: Normal rate and regular rhythm.      Pulses: Normal pulses.   Pulmonary:      Effort: No respiratory distress.      Breath sounds: No stridor. No wheezing, rhonchi or rales.   Chest:      Chest wall: No tenderness.   Abdominal:      General: Abdomen is flat. There is no distension.      Tenderness: There is no abdominal tenderness. There is no guarding.   Musculoskeletal:         General: No swelling, tenderness or deformity. Normal range of motion.      Cervical  back: Normal range of motion.      Right lower leg: No edema.   Skin:     General: Skin is warm.      Capillary Refill: Capillary refill takes 2 to 3 seconds.      Coloration: Skin is not jaundiced.      Findings: No bruising or lesion.   Neurological:      General: No focal deficit present.      Motor: Weakness present.      Comments: Periods of confusion   Psychiatric:         Mood and Affect: Mood normal.       Fluids    Intake/Output Summary (Last 24 hours) at 2/24/2023 0858  Last data filed at 2/23/2023 1403  Gross per 24 hour   Intake 120 ml   Output --   Net 120 ml         Laboratory  Recent Labs     02/22/23  1212 02/23/23  0306 02/24/23  0103   WBC 13.9* 10.9* 12.5*   RBC 4.99 4.68 4.64   HEMOGLOBIN 15.8 14.6 14.6   HEMATOCRIT 48.2* 45.6 44.3   MCV 96.6 97.4 95.5   MCH 31.7 31.2 31.5   MCHC 32.8* 32.0* 33.0*   RDW 50.0 50.5* 49.5   PLATELETCT 131* 112* 105*   MPV 12.0 12.4 12.2       Recent Labs     02/22/23  1212 02/23/23  0306 02/24/23  0103   SODIUM 142 143 141   POTASSIUM 3.7 3.6 4.1   CHLORIDE 105 106 106   CO2 23 24 21   GLUCOSE 151* 137* 124*   BUN 35* 40* 42*   CREATININE 1.09 0.94 0.77   CALCIUM 8.9 9.1 9.3       Recent Labs     02/22/23  1212   INR 1.47*                 Imaging  DX-CHEST-PORTABLE (1 VIEW)   Final Result      1.  Cardiac silhouette enlargement.   2.  Mild left basilar atelectasis.             Assessment/Plan  * Sepsis (HCC)- (present on admission)  Assessment & Plan  This is Sepsis Present on admission  SIRS criteria identified on my evaluation include: Fever, with temperature greater than 101 deg F, Tachycardia, with heart rate greater than 90 BPM, Leukocytosis, with WBC greater than 12,000 and Bandemia, greater than 10% bands  Source is UTI and lung  Sepsis protocol initiated  Fluid resuscitation ordered per protocol  Crystalloid Fluid Administration: Fluid resuscitation ordered per standard protocol - 30 mL/kg per current or ideal body weight  IV antibiotics as appropriate for  source of sepsis  Reassessment: I have reassessed the patient's hemodynamic status    Follow-up urine for culture    Sputum culture if possible    IV Rocephin and p.o. doxycycline    Saline bolus 500 cc given on 2/24/2023    Lactic acid in the a.m.     proCalcitonin level in a.m.    Prolonged Q-T interval on ECG- (present on admission)  Assessment & Plan  Avoid QT prolonging drugs    Lactic acidosis- (present on admission)  Assessment & Plan  Due to sepsis.  IV fluids  Monitor vitals  And repeat labs    Thrombocytopenia (HCC)  Assessment & Plan  2/22 Plt:131  Monitor cbc      Elevated AST (SGOT)- (present on admission)  Assessment & Plan  No alcohol use per S.O.  Monitor cmp    UTI (urinary tract infection)  Assessment & Plan  Initiated on rocephin  Monitor urine culture  Monitor I/O's, labs, vital.    Chronic back pain- (present on admission)  Assessment & Plan  Has zanaflex at home prn per her S.O.  Avoid narcotics as able.  lidoderm patch for now if needed.    Cognitive decline- (present on admission)  Assessment & Plan  Acutely worse per Shawn her significant other.  Monitor neurologic status  Treat sepsis and monitor.    Cervical spinal stenosis- (present on admission)  Assessment & Plan  lidoderm patch if needed.  Avoid narcotics         VTE prophylaxis: SCDs/TEDs    I have performed a physical exam and reviewed and updated ROS and Plan today (2/24/2023). In review of yesterday's note (2/23/2023), there are no changes except as documented above.

## 2023-02-24 NOTE — THERAPY
Physical Therapy   Initial Evaluation     Patient Name: Chava Bray  Age:  87 y.o., Sex:  female  Medical Record #: [id number]  Today's Date: 2/24/2023     Precautions  Precautions: Fall Risk;Swallow Precautions ( See Comments)  Comments: AMS    Assessment  Patient is 87 y.o. female with a diagnosis of Sepsis and UTI admitted on 2/22 due to altered mental status and lethargy.     Pt tolerated session fairly. She reported consistent nausea during session which affected her ability to participate. She required MOD A for supine to sit. She tolerated 3 mins of static sitting at edge of bed, however then spontaneously laid back down stating nausea. She was educated on importance of upright mobility, assessing standing tolerance and status however refused to participate further in session due to nausea. RN was made aware. She remained in sidelying in bed with all needs in reach and bed alarm on.     At this time she presents with impaired upright tolerance, nausea, LE weakness, impaired bed mobility, and impaired cognition. She will benefit from acute PT services to improve her upright tolerance, improve her LE strength, and improve her functional mobility.     Plan    Physical Therapy Initial Treatment Plan   Treatment Plan : Bed Mobility, Equipment, Group Therapy, Gait Training, Manual Therapy, Neuro Re-Education / Balance, Self Care / Home Evaluation, Stair Training, Therapeutic Activities, Therapeutic Exercise  Treatment Frequency: 3 Times per Week  Duration: Until Therapy Goals Met    DC Equipment Recommendations: Unable to determine at this time  Discharge Recommendations: Recommend post-acute placement for additional physical therapy services prior to discharge home       Subjective    Pt initially refusing therapy due to nausea and feelings of vomiting. After education pt was willing to participate in PT session to facilitate DC plan. She reported 5/10 stomach pain.        Objective       02/24/23 0930    Charge Group   PT Evaluation PT Evaluation Mod   Total Time Spent   PT Total Time Yes   PT Evaluation Time Spent (Mins) 13   PT Total Time Spent (Calculated) 13   Initial Contact Note    Initial Contact Note Order Received and Verified, Physical Therapy Evaluation in Progress with Full Report to Follow.   Vitals   Pulse 88   Pulse Oximetry 96 %   O2 (LPM) 2   O2 Delivery Device Nasal Cannula   Pain 0 - 10 Group   Location Abdomen   Pain Rating Scale (NPRS) 5   Description Aching   Prior Living Situation   Prior Services None   Housing / Facility 2 Story House   Steps In Home 18   Equipment Owned Unable to Determine At This Time   Comments Pt was unreliable for housing information. Home information obtained from chart   Prior Level of Functional Mobility   Bed Mobility Independent   Transfer Status Independent   Ambulation Independent   Stairs Independent   Comments Pt reports that she ambulated independently without a device.   Cognition    Level of Consciousness Alert   Active ROM Lower Body    Active ROM Lower Body  WDL   Strength Lower Body   Lower Body Strength  X   Rt Hip Flexion Strength 3+ (F+)   Rt Knee Extension Strength 3+ (F+)   Rt Ankle Dorsiflexion Strength 3+ (F+)   Lt Hip Flexion Strength 3+ (F+)   Lt Knee Extension Strength 3+ (F+)   Lt Ankle Dorsiflexion Strength 3+ (F+)   Balance Assessment   Sitting Balance (Static) Fair   Sitting Balance (Dynamic) Fair   Comments Pt refused to stand d/t nausea   Bed Mobility    Supine to Sit Moderate Assist   Sit to Supine Supervised   Gait Analysis   Gait Level Of Assist Unable to Participate   Functional Mobility   Sit to Stand Unable to Participate   Bed, Chair, Wheelchair Transfer Unable to Participate   How much difficulty does the patient currently have...   Turning over in bed (including adjusting bedclothes, sheets and blankets)? 3   Sitting down on and standing up from a chair with arms (e.g., wheelchair, bedside commode, etc.) 1   Moving from lying on  back to sitting on the side of the bed? 3   How much help from another person does the patient currently need...   Moving to and from a bed to a chair (including a wheelchair)? 2   Need to walk in a hospital room? 1   Climbing 3-5 steps with a railing? 1   6 clicks Mobility Score 11   Activity Tolerance   Sitting in Chair Pt tolerated sitting edge of bed for approximately 3 mins before laying down due to nausea   Short Term Goals    Short Term Goal # 1 Pt will perform sit<>supine with supervision within 6 visits   Short Term Goal # 2 Pt will perform sit<>stand and stand pivot transfers with supervision and LRAD within 6 visits   Short Term Goal # 3 Pt will ambulate 100' with LRAD and CGA within 6 visits   Education Group   Education Provided Role of Physical Therapist;Transfer Status   Role of Physical Therapist Patient Response Patient;Acceptance;Nonacceptance;Explanation;Demonstration;Reinforcement Needed   Transfer Status Patient Response Patient;Acceptance;Explanation;Demonstration;Reinforcement Needed;No Learning Evidence;Teaching Refused   Physical Therapy Initial Treatment Plan    Treatment Plan  Bed Mobility;Equipment;Group Therapy;Gait Training;Manual Therapy;Neuro Re-Education / Balance;Self Care / Home Evaluation;Stair Training;Therapeutic Activities;Therapeutic Exercise   Treatment Frequency 3 Times per Week   Duration Until Therapy Goals Met   Problem List    Problems Pain;Impaired Bed Mobility;Impaired Transfers;Impaired Ambulation;Functional ROM Deficit;Functional Strength Deficit;Impaired Balance;Impaired Coordination;Impaired Vision;Decreased Activity Tolerance;Safety Awareness Deficits / Cognition;Limited Knowledge of Post-Op Precautions   Anticipated Discharge Equipment and Recommendations   DC Equipment Recommendations Unable to determine at this time   Discharge Recommendations Recommend post-acute placement for additional physical therapy services prior to discharge home   Interdisciplinary  Plan of Care Collaboration   IDT Collaboration with  Nursing   Patient Position at End of Therapy In Bed;Bed Alarm On;Call Light within Reach;Tray Table within Reach;Phone within Reach   Session Information   Date / Session Number  2/24 - 1 (1/3, 3/2)

## 2023-02-24 NOTE — THERAPY
Speech Language Pathology  Daily Treatment     Patient Name: Chava Bray  Age:  87 y.o., Sex:  female  Medical Record #: [id number]  Today's Date: 2/24/2023     Precautions  Precautions: Fall Risk, Swallow Precautions (See Comments)  Comments: AMS    Assessment  Patient seen this date for dysphagia tx session, as last SLP recommended patient for high f/u to check diet tolerance. Per RN and patient, patient appears to be tolerating diet without difficulty. Patient reports no difficulty and reports she like current diet texture, but has had poor appetite. Patient agreeable to therapy objectives. Patient consumed PO trials of soft solids and thin liquids via straw. Patient consumed all PO trials without s/sx of aspiration. Mastication was mildly prolonged, but functional. No oral residue noted and no s/sx of aspiration on any textures trialed.     Recommend patient continue SB6/TN0 diet (soft/bite-sized w/ thins) w/ 1:1 feeding. Please encourage PO intake. Meds ok as tolerated. Small straw sips ok. SLP is following.     Plan  Continue current treatment plan.    Discharge Recommendations: Anticipate that the patient will have no further speech therapy needs after discharge from the hospital     Objective     02/24/23 1015   Precautions   Precautions Fall Risk;Swallow Precautions ( See Comments)   Vitals   O2 (LPM) 2   O2 Delivery Device Nasal Cannula   Pain 0 - 10 Group   Therapist Pain Assessment Post Activity Pain Same as Prior to Activity;Nurse Notified;0   Dysphagia    Diet / Liquid Recommendation Soft & Bite-Sized (6) - (Dysphagia III);Thin (0)   Nutritional Liquid Intake Rating Scale Non thickened beverages   Nutritional Food Intake Rating Scale Total oral diet with multiple consistencies but requiring special preparation or compensations   Nursing Communication Swallow Precaution Sign Posted at Head of Bed   Skilled Intervention Verbal Cueing;Compensatory Strategies   Recommended Route of Medication  "Administration   Medication Administration  Whole with Liquid Wash;Float Whole with Puree   Patient / Family Goals   Patient / Family Goal #1 \"I like the applesauce\"   Goal #1 Outcome Progressing as expected   Short Term Goals   Short Term Goal # 1 Patient will consume a diet of soft/bite sized solids and thin liquids with no overt s/sx of aspiration with 1:1 feeding A   Goal Outcome # 1 Progressing as expected   Education Group   Education Provided Dysphagia   Dysphagia Patient Response Patient;Acceptance;Explanation;Verbal Demonstration;Reinforcement Needed   Anticipated Discharge Needs   Discharge Recommendations Anticipate that the patient will have no further speech therapy needs after discharge from the hospital       "

## 2023-02-25 LAB
ANION GAP SERPL CALC-SCNC: 13 MMOL/L (ref 7–16)
BACTERIA BLD CULT: ABNORMAL
BACTERIA UR CULT: ABNORMAL
BACTERIA UR CULT: ABNORMAL
BUN SERPL-MCNC: 33 MG/DL (ref 8–22)
CALCIUM SERPL-MCNC: 9.2 MG/DL (ref 8.5–10.5)
CHLORIDE SERPL-SCNC: 103 MMOL/L (ref 96–112)
CO2 SERPL-SCNC: 22 MMOL/L (ref 20–33)
CREAT SERPL-MCNC: 0.6 MG/DL (ref 0.5–1.4)
ERYTHROCYTE [DISTWIDTH] IN BLOOD BY AUTOMATED COUNT: 48 FL (ref 35.9–50)
GFR SERPLBLD CREATININE-BSD FMLA CKD-EPI: 87 ML/MIN/1.73 M 2
GLUCOSE SERPL-MCNC: 117 MG/DL (ref 65–99)
HCT VFR BLD AUTO: 46.1 % (ref 37–47)
HGB BLD-MCNC: 15.2 G/DL (ref 12–16)
LACTATE SERPL-SCNC: 2.7 MMOL/L (ref 0.5–2)
MCH RBC QN AUTO: 31.2 PG (ref 27–33)
MCHC RBC AUTO-ENTMCNC: 33 G/DL (ref 33.6–35)
MCV RBC AUTO: 94.7 FL (ref 81.4–97.8)
PLATELET # BLD AUTO: 133 K/UL (ref 164–446)
PMV BLD AUTO: 11.6 FL (ref 9–12.9)
POTASSIUM SERPL-SCNC: 3.8 MMOL/L (ref 3.6–5.5)
PROCALCITONIN SERPL-MCNC: 11.7 NG/ML
RBC # BLD AUTO: 4.87 M/UL (ref 4.2–5.4)
SIGNIFICANT IND 70042: ABNORMAL
SITE SITE: ABNORMAL
SODIUM SERPL-SCNC: 138 MMOL/L (ref 135–145)
SOURCE SOURCE: ABNORMAL
WBC # BLD AUTO: 12.5 K/UL (ref 4.8–10.8)

## 2023-02-25 PROCEDURE — A9270 NON-COVERED ITEM OR SERVICE: HCPCS | Performed by: HOSPITALIST

## 2023-02-25 PROCEDURE — 85027 COMPLETE CBC AUTOMATED: CPT

## 2023-02-25 PROCEDURE — 94760 N-INVAS EAR/PLS OXIMETRY 1: CPT

## 2023-02-25 PROCEDURE — 36415 COLL VENOUS BLD VENIPUNCTURE: CPT

## 2023-02-25 PROCEDURE — 770006 HCHG ROOM/CARE - MED/SURG/GYN SEMI*

## 2023-02-25 PROCEDURE — 700102 HCHG RX REV CODE 250 W/ 637 OVERRIDE(OP): Performed by: HOSPITALIST

## 2023-02-25 PROCEDURE — 99232 SBSQ HOSP IP/OBS MODERATE 35: CPT | Performed by: HOSPITALIST

## 2023-02-25 PROCEDURE — 80048 BASIC METABOLIC PNL TOTAL CA: CPT

## 2023-02-25 PROCEDURE — 700105 HCHG RX REV CODE 258: Performed by: HOSPITALIST

## 2023-02-25 PROCEDURE — 84145 PROCALCITONIN (PCT): CPT

## 2023-02-25 PROCEDURE — 700101 HCHG RX REV CODE 250: Performed by: HOSPITALIST

## 2023-02-25 PROCEDURE — 700111 HCHG RX REV CODE 636 W/ 250 OVERRIDE (IP): Performed by: HOSPITALIST

## 2023-02-25 PROCEDURE — 83605 ASSAY OF LACTIC ACID: CPT

## 2023-02-25 RX ADMIN — SODIUM CHLORIDE, POTASSIUM CHLORIDE, SODIUM LACTATE AND CALCIUM CHLORIDE: 600; 310; 30; 20 INJECTION, SOLUTION INTRAVENOUS at 20:47

## 2023-02-25 RX ADMIN — ACETAMINOPHEN 650 MG: 325 TABLET, FILM COATED ORAL at 20:57

## 2023-02-25 RX ADMIN — CEFTRIAXONE SODIUM 2000 MG: 10 INJECTION, POWDER, FOR SOLUTION INTRAVENOUS at 13:00

## 2023-02-25 RX ADMIN — SENNOSIDES AND DOCUSATE SODIUM 2 TABLET: 50; 8.6 TABLET ORAL at 04:02

## 2023-02-25 RX ADMIN — SODIUM CHLORIDE, POTASSIUM CHLORIDE, SODIUM LACTATE AND CALCIUM CHLORIDE: 600; 310; 30; 20 INJECTION, SOLUTION INTRAVENOUS at 09:05

## 2023-02-25 RX ADMIN — RIVAROXABAN 10 MG: 10 TABLET, FILM COATED ORAL at 18:11

## 2023-02-25 RX ADMIN — SENNOSIDES AND DOCUSATE SODIUM 2 TABLET: 50; 8.6 TABLET ORAL at 18:11

## 2023-02-25 RX ADMIN — DOXYCYCLINE 100 MG: 100 TABLET, FILM COATED ORAL at 18:11

## 2023-02-25 RX ADMIN — DOXYCYCLINE 100 MG: 100 TABLET, FILM COATED ORAL at 04:02

## 2023-02-25 ASSESSMENT — FIBROSIS 4 INDEX: FIB4 SCORE: 6.47

## 2023-02-25 ASSESSMENT — PAIN DESCRIPTION - PAIN TYPE
TYPE: ACUTE PAIN

## 2023-02-25 NOTE — PROGRESS NOTES
Hospital Medicine Daily Progress Note    Date of Service  2/25/2023    Chief Complaint  Chava Bray is a 87 y.o. female admitted 2/22/2023 with confusion    Hospital Course  No notes on file    Interval Problem Update  Treating UTI and pneumonia    Patient currently hypoxic -on 4 L of oxygen satting at 96 %    Currently on IV Rocephin, po doxy      Still intermittently confused    Lactic acid level still elevated at 2.7    Procalcitonin level has decreased but is still elevated at 11    PT OT has recommended SNF and a SNF referral has been made      I have discussed this patient's plan of care and discharge plan at IDT rounds today with Case Management, Nursing, Nursing leadership, and other members of the IDT team.    Consultants/Specialty      Code Status  DNAR/DNI    Disposition  Patient is not medically cleared for discharge.   Anticipate discharge to to home with close outpatient follow-up.  I have placed the appropriate orders for post-discharge needs.    Review of Systems  Review of Systems   Unable to perform ROS: Mental status change      Physical Exam  Temp:  [36.4 °C (97.5 °F)-36.7 °C (98 °F)] 36.4 °C (97.5 °F)  Pulse:  [] 67  Resp:  [16-18] 16  BP: (131-140)/(73-97) 138/97  SpO2:  [92 %-99 %] 96 %    Physical Exam  Constitutional:       General: She is not in acute distress.     Appearance: She is ill-appearing. She is not toxic-appearing.   Eyes:      General:         Left eye: No discharge.      Extraocular Movements: Extraocular movements intact.      Pupils: Pupils are equal, round, and reactive to light.   Cardiovascular:      Rate and Rhythm: Normal rate and regular rhythm.      Pulses: Normal pulses.   Pulmonary:      Effort: No respiratory distress.      Breath sounds: No stridor. No wheezing, rhonchi or rales.   Chest:      Chest wall: No tenderness.   Abdominal:      General: Abdomen is flat. There is no distension.      Tenderness: There is no abdominal tenderness. There is no  guarding.   Musculoskeletal:         General: No swelling, tenderness or deformity. Normal range of motion.      Cervical back: Normal range of motion.      Right lower leg: No edema.   Skin:     General: Skin is warm.      Capillary Refill: Capillary refill takes 2 to 3 seconds.      Coloration: Skin is not jaundiced.      Findings: No bruising or lesion.   Neurological:      General: No focal deficit present.      Motor: Weakness present.      Comments: Periods of confusion   Psychiatric:         Mood and Affect: Mood normal.       Fluids    Intake/Output Summary (Last 24 hours) at 2/25/2023 0933  Last data filed at 2/24/2023 1700  Gross per 24 hour   Intake 0 ml   Output 625 ml   Net -625 ml         Laboratory  Recent Labs     02/23/23  0306 02/24/23  0103 02/25/23  0536   WBC 10.9* 12.5* 12.5*   RBC 4.68 4.64 4.87   HEMOGLOBIN 14.6 14.6 15.2   HEMATOCRIT 45.6 44.3 46.1   MCV 97.4 95.5 94.7   MCH 31.2 31.5 31.2   MCHC 32.0* 33.0* 33.0*   RDW 50.5* 49.5 48.0   PLATELETCT 112* 105* 133*   MPV 12.4 12.2 11.6       Recent Labs     02/23/23  0306 02/24/23  0103 02/25/23  0536   SODIUM 143 141 138   POTASSIUM 3.6 4.1 3.8   CHLORIDE 106 106 103   CO2 24 21 22   GLUCOSE 137* 124* 117*   BUN 40* 42* 33*   CREATININE 0.94 0.77 0.60   CALCIUM 9.1 9.3 9.2       Recent Labs     02/22/23  1212   INR 1.47*                 Imaging  DX-CHEST-PORTABLE (1 VIEW)   Final Result      1.  Cardiac silhouette enlargement.   2.  Mild left basilar atelectasis.             Assessment/Plan  * Sepsis (HCC)- (present on admission)  Assessment & Plan  This is Sepsis Present on admission  SIRS criteria identified on my evaluation include: Fever, with temperature greater than 101 deg F, Tachycardia, with heart rate greater than 90 BPM, Leukocytosis, with WBC greater than 12,000 and Bandemia, greater than 10% bands  Source is UTI and lung  Sepsis protocol initiated  Fluid resuscitation ordered per protocol  Crystalloid Fluid Administration: Fluid  resuscitation ordered per standard protocol - 30 mL/kg per current or ideal body weight  IV antibiotics as appropriate for source of sepsis  Reassessment: I have reassessed the patient's hemodynamic status    Follow-up urine for culture    Sputum culture if possible    IV Rocephin and p.o. doxycycline    Saline bolus 500 cc given on 2/24/2023    Lactic acid in the a.m.        Prolonged Q-T interval on ECG- (present on admission)  Assessment & Plan  Avoid QT prolonging drugs    Lactic acidosis- (present on admission)  Assessment & Plan  Due to sepsis.  IV fluids  Monitor vitals  And repeat labs    Thrombocytopenia (HCC)  Assessment & Plan  2/22 Plt:131  Monitor cbc      Elevated AST (SGOT)- (present on admission)  Assessment & Plan  No alcohol use per S.O.  Monitor cmp    UTI (urinary tract infection)  Assessment & Plan  Initiated on rocephin  Monitor urine culture  Monitor I/O's, labs, vital.    Chronic back pain- (present on admission)  Assessment & Plan  Has zanaflex at home prn per her S.O.  Avoid narcotics as able.  lidoderm patch for now if needed.    Cognitive decline- (present on admission)  Assessment & Plan  Acutely worse per Shawn her significant other.  Monitor neurologic status  Treat sepsis and monitor.    Cervical spinal stenosis- (present on admission)  Assessment & Plan  lidoderm patch if needed.  Avoid narcotics         VTE prophylaxis: SCDs/TEDs    I have performed a physical exam and reviewed and updated ROS and Plan today (2/25/2023). In review of yesterday's note (2/24/2023), there are no changes except as documented above.

## 2023-02-25 NOTE — PROGRESS NOTES
PIV accidentally removed by patient. Attempted new PIVx2. US guided PIV needed. No new PIV at this time.

## 2023-02-25 NOTE — DISCHARGE PLANNING
Case Management Discharge Planning    Admission Date: 2/22/2023  GMLOS: 5  ALOS: 2    6-Clicks ADL Score: 18  6-Clicks Mobility Score: 11  PT and/or OT Eval ordered: Yes  Post-acute Referrals Ordered: Yes  Post-acute Choice Obtained: Yes  Has referral(s) been sent to post-acute provider:  Yes      Anticipated Discharge Dispo: Discharge Disposition: D/T to SNF with Medicare cert in anticipation of skilled care (03)    DME Needed: No    Action(s) Taken: Updated Provider/Nurse on Discharge Plan, Choice obtained, and Referral(s) sent PASRR completed [id number]    Escalations Completed: Pending Discharge Destination    Medically Clear: No    Next Steps: CM reviewed therapy notes- recommending SNF. CARY met with Georgia at bedside to discuss SNF. Georgia is not ok with going to SNF and would like a few more days to get stronger. CARY educated Georgia that she is not safe to return home if she is weak. She is agreeable to sending out referral to [facility] and [facility] but she wants to see if she is strong enough by Monday. CM obtained choice and faxed to [facility] for processing.    CM will continue to follow.       Barriers to Discharge: Medical clearance, Pending Placement, and Transportation    Is the patient up for discharge tomorrow: No

## 2023-02-25 NOTE — DISCHARGE PLANNING
Received Choice form at 161  Agency/Facility Name: [facility] and [facility]  Referral sent per Choice form at [id number]

## 2023-02-25 NOTE — PROGRESS NOTES
Siomara from Lab called with critical result of procal of 11.7 at 06:28. Critical lab result read back to Siomara.   On-call provider, Linsey Wegener, notified of critical lab result at 06:33.  Critical lab result read back by Wegener.

## 2023-02-25 NOTE — CARE PLAN
The patient is Stable - Low risk of patient condition declining or worsening    Shift Goals  Clinical Goals: IVF, safety  Patient Goals: Go home and see Shawn  Family Goals: n/v      Problem: Knowledge Deficit - Standard  Goal: Patient and family/care givers will demonstrate understanding of plan of care, disease process/condition, diagnostic tests and medications  Outcome: Progressing     Problem: Urinary - Renal Perfusion  Goal: Ability to achieve and maintain adequate renal perfusion and functioning will improve  Outcome: Progressing     Problem: Respiratory  Goal: Patient will achieve/maintain optimum respiratory ventilation and gas exchange  Outcome: Progressing     Problem: Skin Integrity  Goal: Skin integrity is maintained or improved  Outcome: Progressing     Problem: Pain - Standard  Goal: Alleviation of pain or a reduction in pain to the patient’s comfort goal  Outcome: Progressing     Problem: Fall Risk  Goal: Patient will remain free from falls  Outcome: Progressing       Progress made toward(s) clinical / shift goals:  Patient updated on the plan of care. All questions answered. Skin and pain assessed. Medicated per MAR. Fall precautions in place. Care ongoing.

## 2023-02-25 NOTE — CARE PLAN
The patient is Stable - Low risk of patient condition declining or worsening    Shift Goals  Clinical Goals:  (safety, Antibiotic therapy)  Patient Goals:  (rest, get stronger)  Family Goals: n/v    Progress made toward(s) clinical / shift goals:    Problem: Knowledge Deficit - Standard  Goal: Patient and family/care givers will demonstrate understanding of plan of care, disease process/condition, diagnostic tests and medications  Outcome: Progressing     Problem: Respiratory  Goal: Patient will achieve/maintain optimum respiratory ventilation and gas exchange  Outcome: Progressing     Problem: Mechanical Ventilation  Goal: Patient will be able to express needs and understand communication  Outcome: Progressing     Problem: Physical Regulation  Goal: Signs and symptoms of infection will decrease  Outcome: Progressing     Problem: Skin Integrity  Goal: Skin integrity is maintained or improved  Outcome: Progressing     Problem: Fall Risk  Goal: Patient will remain free from falls  Outcome: Progressing       Patient is not progressing towards the following goals:

## 2023-02-26 LAB
ANION GAP SERPL CALC-SCNC: 8 MMOL/L (ref 7–16)
BUN SERPL-MCNC: 23 MG/DL (ref 8–22)
CALCIUM SERPL-MCNC: 9 MG/DL (ref 8.5–10.5)
CHLORIDE SERPL-SCNC: 104 MMOL/L (ref 96–112)
CO2 SERPL-SCNC: 27 MMOL/L (ref 20–33)
CREAT SERPL-MCNC: 0.56 MG/DL (ref 0.5–1.4)
ERYTHROCYTE [DISTWIDTH] IN BLOOD BY AUTOMATED COUNT: 48.1 FL (ref 35.9–50)
GFR SERPLBLD CREATININE-BSD FMLA CKD-EPI: 89 ML/MIN/1.73 M 2
GLUCOSE SERPL-MCNC: 113 MG/DL (ref 65–99)
HCT VFR BLD AUTO: 45.7 % (ref 37–47)
HGB BLD-MCNC: 15.3 G/DL (ref 12–16)
LACTATE SERPL-SCNC: 1.8 MMOL/L (ref 0.5–2)
MCH RBC QN AUTO: 31.1 PG (ref 27–33)
MCHC RBC AUTO-ENTMCNC: 33.5 G/DL (ref 33.6–35)
MCV RBC AUTO: 92.9 FL (ref 81.4–97.8)
PLATELET # BLD AUTO: 126 K/UL (ref 164–446)
PMV BLD AUTO: 11.6 FL (ref 9–12.9)
POTASSIUM SERPL-SCNC: 3.4 MMOL/L (ref 3.6–5.5)
RBC # BLD AUTO: 4.92 M/UL (ref 4.2–5.4)
SODIUM SERPL-SCNC: 139 MMOL/L (ref 135–145)
WBC # BLD AUTO: 10.8 K/UL (ref 4.8–10.8)

## 2023-02-26 PROCEDURE — 700102 HCHG RX REV CODE 250 W/ 637 OVERRIDE(OP): Performed by: HOSPITALIST

## 2023-02-26 PROCEDURE — 36415 COLL VENOUS BLD VENIPUNCTURE: CPT

## 2023-02-26 PROCEDURE — A9270 NON-COVERED ITEM OR SERVICE: HCPCS | Performed by: HOSPITALIST

## 2023-02-26 PROCEDURE — 83605 ASSAY OF LACTIC ACID: CPT

## 2023-02-26 PROCEDURE — 80048 BASIC METABOLIC PNL TOTAL CA: CPT

## 2023-02-26 PROCEDURE — 85027 COMPLETE CBC AUTOMATED: CPT

## 2023-02-26 PROCEDURE — 700101 HCHG RX REV CODE 250: Performed by: HOSPITALIST

## 2023-02-26 PROCEDURE — 770006 HCHG ROOM/CARE - MED/SURG/GYN SEMI*

## 2023-02-26 PROCEDURE — 700105 HCHG RX REV CODE 258: Performed by: HOSPITALIST

## 2023-02-26 PROCEDURE — 99232 SBSQ HOSP IP/OBS MODERATE 35: CPT | Performed by: HOSPITALIST

## 2023-02-26 PROCEDURE — 700111 HCHG RX REV CODE 636 W/ 250 OVERRIDE (IP): Performed by: HOSPITALIST

## 2023-02-26 RX ORDER — POTASSIUM CHLORIDE 20 MEQ/1
40 TABLET, EXTENDED RELEASE ORAL ONCE
Status: COMPLETED | OUTPATIENT
Start: 2023-02-26 | End: 2023-02-26

## 2023-02-26 RX ADMIN — DOXYCYCLINE 100 MG: 100 TABLET, FILM COATED ORAL at 04:07

## 2023-02-26 RX ADMIN — SENNOSIDES AND DOCUSATE SODIUM 2 TABLET: 50; 8.6 TABLET ORAL at 04:07

## 2023-02-26 RX ADMIN — SODIUM CHLORIDE, POTASSIUM CHLORIDE, SODIUM LACTATE AND CALCIUM CHLORIDE: 600; 310; 30; 20 INJECTION, SOLUTION INTRAVENOUS at 07:42

## 2023-02-26 RX ADMIN — RIVAROXABAN 10 MG: 10 TABLET, FILM COATED ORAL at 18:03

## 2023-02-26 RX ADMIN — DOXYCYCLINE 100 MG: 100 TABLET, FILM COATED ORAL at 18:03

## 2023-02-26 RX ADMIN — SENNOSIDES AND DOCUSATE SODIUM 2 TABLET: 50; 8.6 TABLET ORAL at 18:03

## 2023-02-26 RX ADMIN — CEFTRIAXONE SODIUM 2000 MG: 10 INJECTION, POWDER, FOR SOLUTION INTRAVENOUS at 14:34

## 2023-02-26 RX ADMIN — POTASSIUM CHLORIDE 40 MEQ: 1500 TABLET, EXTENDED RELEASE ORAL at 14:21

## 2023-02-26 ASSESSMENT — PAIN DESCRIPTION - PAIN TYPE
TYPE: ACUTE PAIN

## 2023-02-26 NOTE — CARE PLAN
Pt able to answer all orientations questions properly, AAOX4 verbalizes needs  Items within reach, bed alarm on , bed low and locked, call light within reach, no s/s of distress noted.    The patient is Watcher - Medium risk of patient condition declining or worsening    Shift Goals  Clinical Goals: iv hydration, abx  Patient Goals: safety  Family Goals: n/v    Progress made toward(s) clinical / shift goals:  Cont with iv hydration, abx, and monitor resp function    Patient is not progressing towards the following goals:      Problem: Knowledge Deficit - Standard  Goal: Patient and family/care givers will demonstrate understanding of plan of care, disease process/condition, diagnostic tests and medications  Outcome: Progressing     Problem: Hemodynamics  Goal: Patient's hemodynamics, fluid balance and neurologic status will be stable or improve  Outcome: Progressing     Problem: Fluid Volume  Goal: Fluid volume balance will be maintained  Outcome: Progressing     Problem: Respiratory  Goal: Patient will achieve/maintain optimum respiratory ventilation and gas exchange  Outcome: Progressing

## 2023-02-26 NOTE — PROGRESS NOTES
Hospital Medicine Daily Progress Note    Date of Service  2/26/2023    Chief Complaint  Chava Bray is a 87 y.o. female admitted 2/22/2023 with confusion    Hospital Course  No notes on file    Interval Problem Update  Treating UTI and pneumonia    Patient currently hypoxic -on 3 L of oxygen satting at 96 %    Currently on IV Rocephin, po doxy      Low potassium of 3.4 noted    Still intermittently confused    Today we have normalized lactic acid      PT OT has recommended SNF and a SNF referral has been made      I have discussed this patient's plan of care and discharge plan at IDT rounds today with Case Management, Nursing, Nursing leadership, and other members of the IDT team.    Consultants/Specialty      Code Status  DNAR/DNI    Disposition  Patient is not medically cleared for discharge.   Anticipate discharge to to home with close outpatient follow-up.  I have placed the appropriate orders for post-discharge needs.    Review of Systems  Review of Systems   Unable to perform ROS: Mental status change      Physical Exam  Temp:  [36.2 °C (97.1 °F)-36.7 °C (98 °F)] 36.7 °C (98 °F)  Pulse:  [79-94] 85  Resp:  [16-18] 18  BP: (119-146)/(69-95) 135/75  SpO2:  [93 %-99 %] 96 %    Physical Exam  Constitutional:       General: She is not in acute distress.     Appearance: She is ill-appearing. She is not toxic-appearing.   Eyes:      General:         Left eye: No discharge.      Extraocular Movements: Extraocular movements intact.      Pupils: Pupils are equal, round, and reactive to light.   Cardiovascular:      Rate and Rhythm: Normal rate and regular rhythm.      Pulses: Normal pulses.   Pulmonary:      Effort: No respiratory distress.      Breath sounds: No stridor. No wheezing, rhonchi or rales.   Chest:      Chest wall: No tenderness.   Abdominal:      General: Abdomen is flat. There is no distension.      Tenderness: There is no abdominal tenderness. There is no guarding.   Musculoskeletal:          General: No swelling, tenderness or deformity. Normal range of motion.      Cervical back: Normal range of motion.      Right lower leg: No edema.   Skin:     General: Skin is warm.      Capillary Refill: Capillary refill takes 2 to 3 seconds.      Coloration: Skin is not jaundiced.      Findings: No bruising or lesion.   Neurological:      General: No focal deficit present.      Motor: Weakness present.      Comments: Periods of confusion   Psychiatric:         Mood and Affect: Mood normal.       Fluids  No intake or output data in the 24 hours ending 02/26/23 1319      Laboratory  Recent Labs     02/24/23  0103 02/25/23  0536 02/26/23  0323   WBC 12.5* 12.5* 10.8   RBC 4.64 4.87 4.92   HEMOGLOBIN 14.6 15.2 15.3   HEMATOCRIT 44.3 46.1 45.7   MCV 95.5 94.7 92.9   MCH 31.5 31.2 31.1   MCHC 33.0* 33.0* 33.5*   RDW 49.5 48.0 48.1   PLATELETCT 105* 133* 126*   MPV 12.2 11.6 11.6       Recent Labs     02/24/23  0103 02/25/23  0536 02/26/23  0323   SODIUM 141 138 139   POTASSIUM 4.1 3.8 3.4*   CHLORIDE 106 103 104   CO2 21 22 27   GLUCOSE 124* 117* 113*   BUN 42* 33* 23*   CREATININE 0.77 0.60 0.56   CALCIUM 9.3 9.2 9.0                       Imaging  DX-CHEST-PORTABLE (1 VIEW)   Final Result      1.  Cardiac silhouette enlargement.   2.  Mild left basilar atelectasis.             Assessment/Plan  * Sepsis (HCC)- (present on admission)  Assessment & Plan  This is Sepsis Present on admission  SIRS criteria identified on my evaluation include: Fever, with temperature greater than 101 deg F, Tachycardia, with heart rate greater than 90 BPM, Leukocytosis, with WBC greater than 12,000 and Bandemia, greater than 10% bands  Source is UTI and lung  Sepsis protocol initiated  Fluid resuscitation ordered per protocol  Crystalloid Fluid Administration: Fluid resuscitation ordered per standard protocol - 30 mL/kg per current or ideal body weight  IV antibiotics as appropriate for source of sepsis  Reassessment: I have reassessed the  patient's hemodynamic status    Follow-up urine for culture    Sputum culture if possible    IV Rocephin and p.o. doxycycline    Saline bolus 500 cc given on 2/24/2023    Lactic acid normalized on 2/26        Prolonged Q-T interval on ECG- (present on admission)  Assessment & Plan  Avoid QT prolonging drugs    Lactic acidosis- (present on admission)  Assessment & Plan  Due to sepsis.  IV fluids stopped on 2/26  Monitor vitals  And repeat labs    Thrombocytopenia (HCC)  Assessment & Plan  2/22 Plt:131  Monitor cbc      Elevated AST (SGOT)- (present on admission)  Assessment & Plan  No alcohol use per S.O.  Monitor cmp    UTI (urinary tract infection)  Assessment & Plan  Initiated on rocephin  Monitor urine culture  Monitor I/O's, labs, vital.    Chronic back pain- (present on admission)  Assessment & Plan  Has zanaflex at home prn per her S.O.  Avoid narcotics as able.  lidoderm patch for now if needed.    Cognitive decline- (present on admission)  Assessment & Plan  Acutely worse per Shawn her significant other.  Monitor neurologic status  Treat sepsis and monitor.    Cervical spinal stenosis- (present on admission)  Assessment & Plan  lidoderm patch if needed.  Avoid narcotics         VTE prophylaxis: SCDs/TEDs    I have performed a physical exam and reviewed and updated ROS and Plan today (2/26/2023). In review of yesterday's note (2/25/2023), there are no changes except as documented above.

## 2023-02-27 LAB
ANION GAP SERPL CALC-SCNC: 11 MMOL/L (ref 7–16)
BUN SERPL-MCNC: 17 MG/DL (ref 8–22)
CALCIUM SERPL-MCNC: 8.9 MG/DL (ref 8.5–10.5)
CHLORIDE SERPL-SCNC: 105 MMOL/L (ref 96–112)
CO2 SERPL-SCNC: 27 MMOL/L (ref 20–33)
CREAT SERPL-MCNC: 0.48 MG/DL (ref 0.5–1.4)
ERYTHROCYTE [DISTWIDTH] IN BLOOD BY AUTOMATED COUNT: 48.7 FL (ref 35.9–50)
GFR SERPLBLD CREATININE-BSD FMLA CKD-EPI: 92 ML/MIN/1.73 M 2
GLUCOSE SERPL-MCNC: 106 MG/DL (ref 65–99)
HCT VFR BLD AUTO: 46.3 % (ref 37–47)
HGB BLD-MCNC: 15.6 G/DL (ref 12–16)
MAGNESIUM SERPL-MCNC: 1.6 MG/DL (ref 1.5–2.5)
MCH RBC QN AUTO: 31.5 PG (ref 27–33)
MCHC RBC AUTO-ENTMCNC: 33.7 G/DL (ref 33.6–35)
MCV RBC AUTO: 93.3 FL (ref 81.4–97.8)
PHOSPHATE SERPL-MCNC: 2.6 MG/DL (ref 2.5–4.5)
PLATELET # BLD AUTO: 143 K/UL (ref 164–446)
PMV BLD AUTO: 11.5 FL (ref 9–12.9)
POTASSIUM SERPL-SCNC: 4 MMOL/L (ref 3.6–5.5)
RBC # BLD AUTO: 4.96 M/UL (ref 4.2–5.4)
SODIUM SERPL-SCNC: 143 MMOL/L (ref 135–145)
WBC # BLD AUTO: 10.4 K/UL (ref 4.8–10.8)

## 2023-02-27 PROCEDURE — 700101 HCHG RX REV CODE 250: Performed by: HOSPITALIST

## 2023-02-27 PROCEDURE — 97166 OT EVAL MOD COMPLEX 45 MIN: CPT

## 2023-02-27 PROCEDURE — A9270 NON-COVERED ITEM OR SERVICE: HCPCS | Performed by: HOSPITALIST

## 2023-02-27 PROCEDURE — 36415 COLL VENOUS BLD VENIPUNCTURE: CPT

## 2023-02-27 PROCEDURE — 99232 SBSQ HOSP IP/OBS MODERATE 35: CPT | Performed by: HOSPITALIST

## 2023-02-27 PROCEDURE — 770006 HCHG ROOM/CARE - MED/SURG/GYN SEMI*

## 2023-02-27 PROCEDURE — 700102 HCHG RX REV CODE 250 W/ 637 OVERRIDE(OP): Performed by: HOSPITALIST

## 2023-02-27 PROCEDURE — 84100 ASSAY OF PHOSPHORUS: CPT

## 2023-02-27 PROCEDURE — 700111 HCHG RX REV CODE 636 W/ 250 OVERRIDE (IP): Performed by: HOSPITALIST

## 2023-02-27 PROCEDURE — 80048 BASIC METABOLIC PNL TOTAL CA: CPT

## 2023-02-27 PROCEDURE — 83735 ASSAY OF MAGNESIUM: CPT

## 2023-02-27 PROCEDURE — 85027 COMPLETE CBC AUTOMATED: CPT

## 2023-02-27 RX ADMIN — CEFTRIAXONE SODIUM 2000 MG: 10 INJECTION, POWDER, FOR SOLUTION INTRAVENOUS at 13:21

## 2023-02-27 RX ADMIN — DOXYCYCLINE 100 MG: 100 TABLET, FILM COATED ORAL at 17:50

## 2023-02-27 RX ADMIN — SENNOSIDES AND DOCUSATE SODIUM 2 TABLET: 50; 8.6 TABLET ORAL at 04:36

## 2023-02-27 RX ADMIN — LIDOCAINE 2 PATCH: 50 PATCH TOPICAL at 17:49

## 2023-02-27 RX ADMIN — RIVAROXABAN 10 MG: 10 TABLET, FILM COATED ORAL at 17:50

## 2023-02-27 RX ADMIN — ACETAMINOPHEN 650 MG: 325 TABLET, FILM COATED ORAL at 04:36

## 2023-02-27 RX ADMIN — DOXYCYCLINE 100 MG: 100 TABLET, FILM COATED ORAL at 04:36

## 2023-02-27 RX ADMIN — SENNOSIDES AND DOCUSATE SODIUM 2 TABLET: 50; 8.6 TABLET ORAL at 17:50

## 2023-02-27 ASSESSMENT — ACTIVITIES OF DAILY LIVING (ADL): TOILETING: INDEPENDENT

## 2023-02-27 ASSESSMENT — COGNITIVE AND FUNCTIONAL STATUS - GENERAL
DAILY ACTIVITIY SCORE: 16
TOILETING: A LOT
SUGGESTED CMS G CODE MODIFIER DAILY ACTIVITY: CK
PERSONAL GROOMING: A LITTLE
DRESSING REGULAR LOWER BODY CLOTHING: A LOT
HELP NEEDED FOR BATHING: A LOT
DRESSING REGULAR UPPER BODY CLOTHING: A LITTLE

## 2023-02-27 ASSESSMENT — PAIN DESCRIPTION - PAIN TYPE
TYPE: ACUTE PAIN

## 2023-02-27 NOTE — DISCHARGE PLANNING
Agency/Facility Name: [facility]  Plan or Request: KUMAR Tang left a detailed vm regarding pending referral, asked for a return call.    1224- KUMAR left 2nd vm for admissions at [facility]    1215- Per request referral sent to  1)[facility] and  2)[facility]   Physical Therapy Re-Evaluation    Visit Count: 8    Precautions: None    SUBJECTIVE     Current Pain (0-10 scale): 0/10  Functional Change: No changes.     OBJECTIVE         Treatment   Compression:  Multiple layer compression wrapping: stockinette, short stretch bandaging 4 cm, cotton padding on bilateral LE      Skilled input: as detailed above    Home Program:   Ankle pump, LAQ, hip flexion    Writer verbally educated the patient and received verbal consent from the patient on hand placement, positioning of patient, and techniques to be performed today including clothing adjustments for techniques, therapist position for techniques, hand placement and palpation for techniques as described above and how they are pertinent to the patient's plan of care.      Suggestions for next session as indicated: progress per plan of care    ASSESSMENT   Patient had a decrease in left LE measurements today compared to last session. Right LE are increased. Made the decision to wrap bilaterally to help with symptoms. Patient demonstrates understanding of how to wrap at home. Will re-assess in 3 weeks to see compliance with HEP. If doing well, will discharge as patient has new orders for her back.   Pain after treatment (patient reported, 0-10 scale): 5  Result of above outlined education: Verbalizes understanding    Procedures and total treatment time documented in Time Entry flowsheet.    Physical Therapy Re-Evaluation    Visit Count: 8     Referred by: Akira Wylie MD/Sony Garcia DO; Next provider visit (if known/scheduled): April 24  Medical Diagnosis (from order):    Diagnosis Information      Diagnosis    457.1 (ICD-9-CM) - I89.0 (ICD-10-CM) - Chronic acquired lymphedema              722.83 (ICD-9-CM) - M96.1 (ICD-10-CM) - Failed back syndrome of lumbar spine   338.4 (ICD-9-CM) - G89.4 (ICD-10-CM) - Chronic pain syndrome   723.1 (ICD-9-CM) - M54.2 (ICD-10-CM) - Cervicalgia   724.1, 338.29 (ICD-9-CM) - M54.6, G89.29  (ICD-10-CM) - Chronic midline thoracic back pain   729.1 (ICD-9-CM) - M79.18 (ICD-10-CM) - Myofascial pain   719.41, 338.29 (ICD-9-CM) - M25.512, G89.29 (ICD-10-CM) - Chronic left shoulder pain       Treatment Diagnosis: Lumbar symptoms with increased pain/symptoms, impaired strength, impaired range of motion, impaired activity tolerance    Date of onset/injury: Patient reports she has had low back pain since she was 27 years old.  Diagnosis Precautions: History of lymphedema  Chart reviewed at time of initial evaluation (relevant co-morbidities, allergies, tests and medications listed): See evaluation     SUBJECTIVE   Description of Problem and/or Mechanism of Injury: Patient reports that she has had back pain since she was 27. She had back fusion (L4/L5) in 2002. She reports that back pain was initially better, but did have nerve damage from it. She has had worsening back pain to the point that she felt she needed to seek treatment. She reports she was approved from a neuro-psych standpoint for a nerve stimulator. Patient is hoping that this is the next step, rather than another surgery first. Goal at this time is to work on improving ROM in lumbar spine to help with patient mobility until further plan of care is determined.     Pain:  Intensity (0-10 scale): Current: 9; Pain Range (best-worst): 5-9/10  Location: Low back, mid back, neck and tailbone.   Quality/Description: Muscle spasm; tearing  Relieving/Alleviating factors: patient unable to state anything that helps to reduce pain  Any tripping, stumbling, loss of balance: No  Any changes in bowel/bladder function: No  Pain with coughing and sneezing: No  Any numbness/tingling or radiating pain: Yes: Right thigh numb and tingling; no left LE symptoms   Night pain: Yes: Can't get comfortable. 4 hours at a time.     Function:  Limitations/exacerbating factors: pain, difficulty, increased time to complete with age appropriate activities, ambulation limited to 5  minutes at a time, bed mobility, bending/squatting/lifting, dressing, lifting/carrying, positional transitions, sleep disturbed  Prior level: gradually worsening back pain  Patient Goal: To have pain stimulator put in    Prior Treatment: no therapies in the past year for current condition. Hospitalization, home health services or skilled nursing facility in the last 30 days: No, per patient.  Home Environment/Social Support: Patient lives with family; no assistance from family/friends available.    Safety:  Do you feel safe at home, work and/or school? yes, per patient  Patient denies 2 or more falls or an unexplained fall with injury in the last year, further testing not required     OBJECTIVE   Posture/Observation/Gait:  Erect posture. Wide base of support.       Lumbar Range of Motion (%)  Date Initial    Flexion Major   Extension Major   Left Lateral Flexion Moderate   Right Lateral Flexion Moderate   Left Rotation Moderate   Right Rotation Moderate   standard testing positions unless otherwise noted; ranges are reported in active range of motion unless noted AA=active assistive range of motion and P=passive range of motion; * =pain  Pain limits end range of all motions     Strength (out of 5)  Date 3/13/2020 3/13/2020   Abdominals 4*    Trunk Extensors      Left Right   Hip Flexors (L2-3) 4+* 4*   Hip Extensors (L4-5)     Hip Abductors (L4-5)     Hip Adductors (L2-3)     Hip External Rotators (L4-5)     Hip Internal Rotators (L2-3)     Knee Extensors (L3-4) 4 4   Knee Flexors (L5-S1)     Ankle Plantar Flexors (S1-2)       Ankle Dorsiflexors (L4-L5)  5 5   Ankle Invertors (L4)     Ankle Evertors (L5-S1)     Extensor Hallucis Longus (L5)     standard testing positions unless otherwise noted, nerve root level included in ( ); *=pain  Only muscle strength that was assessed are noted.    Muscle Flexibility   only deficits assessed reported below:   Left Right  Left Right   Adductors (long)   Adductors (short)      Piriformis    Hamstring mod mod   Iliopsoas   Iliotibial Band     Rectus Femoris/Quadriceps   Gastrocnemius     Soleus         X=impairment assessed; amount of impairment maybe indicated by min=minimal impairment, mod=moderate impairment, sev=severe impairment; hamstring may be reported in 90/90 test as indicated by degrees    Palpation:  Patient is tender to palpation of low back, PSIS, and iliac crests (bilateral)    Special Tests:  Passive Straight Leg Raise Test: Positive right and Negative left  for nerve root impingement  Slump Test: Negative right and Negative left for nerve root impingement  Standing Forward Bend Test: Negative for sacroiliac joint dysfunction    Outcome Measures:   Oswestry: 46% (0-20% = minimal disability; 20-40% = moderate disability; 40-60% = severe disability; 60-80% = crippled; % = bed bound)     Lymphedema Circumference (cm): Lower Extremity  Date Jan 2 Jan 2 Jan 2 March 13 March 13   Landmarks   left right diff left right   40 cm 40.6 41.0 -0.4 39.5 41.0   30 cm 40.2 41.2 -1.0 40.5 40.0   20 cm 33.5 31.3 2.2 32.0 30.5   10 cm 29.2 28.1 1.1 29.1 26.5   Total     1.9      % loss/gain     1.34      Key: diff = difference      Initial Treatment   Initial evaluation completed.    Therapeutic Exercise:   Instructed patient in HEP.  Discussed plan of care.      Compression/Manual:  Multiple layer compression wrapping: stockinette, short stretch bandaging 4 cm, cotton padding on bilateral LE    Skilled input: as detailed above    Home Program:  * above=instructed home program    Exercise: Date issued Date DC Comments   Continue wrapping bilateral LE 1/6/2020     Walking - increase as tolerated 3/13/2020     Seated pelvic tilts 3/13/2020                     Writer verbally educated the patient and received verbal consent from the patient on hand placement, positioning of patient, and techniques to be performed today including clothing adjustments for techniques, therapist position for  techniques, hand placement and palpation for techniques as described above and how they are pertinent to the patient's plan of care.     Suggestions for next session as indicated: progress per plan of care; work on increasing activity tolerance;      ASSESSMENT   60 year old female patient has signs and symptoms consistent with low back pain with right LE radicular symptoms from nerve foot impingement and failed back syndrome that has reported functional limitations listed above.     Patient will benefit from skilled therapy and Rehabilitative potential is good based on assessment above  Predicted patient presentation: Moderate (evolving) - Patient comorbidities and complexities, as defined above, may have varying impact on steady progress for prescribed plan of care.    PLAN   Goals: To be obtained by end of this plan of care:  1. Patient will be independent with progressed and modified home exercise program   2. Decrease pain/symptoms to 2/10  3. Improve involved range of motion to minimal limitation in lumbar planes  through improvements listed above patient will:  4. Be able to ambulate for 30 minutes without pain/difficulty      The following skilled interventions to be implemented to achieve above:  Manual Therapy (66057)  Neuromuscular Re-Education (57606)  Therapeutic Exercise (77763)    Frequency/Duration: 1 times per week for 6 weeks with tapering as the patient progresses for an estimated total of 6 visits    patient involved in and agreed to plan of care and goals.   Attendance policy provided at time of evaluation.     Patient Education:   Who will be receiving education: patient  Are they ready to learn: yes  Preferred learning style: written, verbal, demonstration  Barriers to learning: no barriers apparent at this time   Result of initial outlined education: Verbalizes understanding    Procedures and total treatment time documented in Time Entry flowsheet.

## 2023-02-27 NOTE — CARE PLAN
The patient is Stable - Low risk of patient condition declining or worsening    Shift Goals  Clinical Goals: Abx therapy, meal supervision  Patient Goals: Safety  Family Goals: n/v    Progress made toward(s) clinical / shift goals:  Patient fed with 1:1 supervision and tolerated antibiotics      Problem: Knowledge Deficit - Standard  Goal: Patient and family/care givers will demonstrate understanding of plan of care, disease process/condition, diagnostic tests and medications  Outcome: Progressing     Problem: Hemodynamics  Goal: Patient's hemodynamics, fluid balance and neurologic status will be stable or improve  Outcome: Progressing     Problem: Fluid Volume  Goal: Fluid volume balance will be maintained  Outcome: Progressing     Problem: Urinary - Renal Perfusion  Goal: Ability to achieve and maintain adequate renal perfusion and functioning will improve  Outcome: Progressing     Problem: Respiratory  Goal: Patient will achieve/maintain optimum respiratory ventilation and gas exchange  Outcome: Progressing     Problem: Mechanical Ventilation  Goal: Safe management of artificial airway and ventilation  Outcome: Progressing  Goal: Successful weaning off mechanical ventilator, spontaneously maintains adequate gas exchange  Outcome: Progressing  Goal: Patient will be able to express needs and understand communication  Outcome: Progressing     Problem: Physical Regulation  Goal: Diagnostic test results will improve  Outcome: Progressing  Goal: Signs and symptoms of infection will decrease  Outcome: Progressing     Problem: Skin Integrity  Goal: Skin integrity is maintained or improved  Outcome: Progressing     Problem: Pain - Standard  Goal: Alleviation of pain or a reduction in pain to the patient’s comfort goal  Outcome: Progressing     Problem: Fall Risk  Goal: Patient will remain free from falls  Outcome: Progressing       Patient is not progressing towards the following goals:

## 2023-02-27 NOTE — PROGRESS NOTES
Received report and assumed care of patient at change of shift. Patient is A&Ox2, oriented to person and place only, on 3L O2, and reports no pain at this time. Patient assessment completed, bed in lowest position, and call light and personal belongings are within reach. Patient expressed no further needs at this time.

## 2023-02-27 NOTE — THERAPY
Occupational Therapy   Initial Evaluation     Patient Name: Chava Bray  Age:  87 y.o., Sex:  female  Medical Record #: [id number]  Today's Date: 2/27/2023    Precautions: Fall Risk, Swallow Precautions ( See Comments)  Comments: AMS    Assessment  Patient is 87 y.o. female admitted with confusion being treated for UTI and pneumonia. Pt primarily limited by confusion, poor sequencing/processing, poor balance, generalized weakness impacting ADLs mobility. Difficult to obtain PLOF 2/2 to confusion, will need to verify. Recommend placement at this time. Will follow for skilled OT services.    Plan    Occupational Therapy Initial Treatment Plan   Treatment Interventions: (P) Self Care / Activities of Daily Living, Adaptive Equipment, Cognitive Skill Development, Neuro Re-Education / Balance, Therapeutic Activity, Therapeutic Exercises  Treatment Frequency: (P) 3 Times per Week  Duration: (P) Until Therapy Goals Met    DC Equipment Recommendations: (P) Unable to determine at this time  Discharge Recommendations: (P) Recommend post-acute placement for additional occupational therapy services prior to discharge home        02/27/23 1405   Prior Living Situation   Prior Services None   Housing / Facility 2 Story House   Lives with - Patient's Self Care Capacity Alone and Unable to Care For Self   Comments Unreliable historian, reports she lives alone independently, need to verify   Prior Level of ADL Function   Self Feeding Independent   Grooming / Hygiene Independent   Bathing Independent   Dressing Independent   Toileting Independent   Comments need to verify   Cognition    Cognition / Consciousness X   Level of Consciousness Alert   Safety Awareness Impaired   New Learning Impaired   Attention Impaired   Comments pleasantly confused, poor insight, required frequent redirection   Active ROM Upper Body   Active ROM Upper Body  WDL   Strength Upper Body   Upper Body Strength  WDL   Balance Assessment   Sitting Balance  (Static) Fair   Sitting Balance (Dynamic) Fair   Weight Shift Sitting Fair   Comments declined standing, required frequent redirection to task, attempting to lay back down throughout session   Bed Mobility    Supine to Sit Minimal Assist   Sit to Supine Standby Assist   Scooting Standby Assist   Comments able to demo lateral scoots SBA   ADL Assessment   Grooming Minimal Assist;Seated  (face washing with frequent cues)   Lower Body Dressing Maximal Assist   Toileting   (using purwic)   How much help from another person does the patient currently need...   6 Clicks Daily Activity Score 16   Patient / Family Goals   Patient / Family Goal #1 none stated   Short Term Goals   Short Term Goal # 1 Pt will demo LB dressing SPV   Short Term Goal # 2 Pt will demo functional txfs min A   Short Term Goal # 3 Pt will complete toileting hygiene SPV   Education Group   Role of Occupational Therapist Patient Response Patient;Acceptance;Explanation   Occupational Therapy Initial Treatment Plan    Treatment Interventions Self Care / Activities of Daily Living;Adaptive Equipment;Cognitive Skill Development;Neuro Re-Education / Balance;Therapeutic Activity;Therapeutic Exercises   Treatment Frequency 3 Times per Week   Duration Until Therapy Goals Met   Problem List   Problem List Decreased Active Daily Living Skills;Decreased Homemaking Skills;Decreased Functional Mobility;Decreased Activity Tolerance;Safety Awareness Deficits / Cognition;Impaired Postural Control / Balance;Impaired Cognitive Function   Anticipated Discharge Equipment and Recommendations   DC Equipment Recommendations Unable to determine at this time   Discharge Recommendations Recommend post-acute placement for additional occupational therapy services prior to discharge home

## 2023-02-27 NOTE — PROGRESS NOTES
Hospital Medicine Daily Progress Note    Date of Service  2/27/2023    Chief Complaint  Chava Bray is a 87 y.o. female admitted 2/22/2023 with confusion    Hospital Course  No notes on file    Interval Problem Update  Treating UTI and pneumonia    Patient currently hypoxic -on 3 L of oxygen satting at 96 %    Currently on IV Rocephin, po doxy    Bp is elevated    Still intermittently confused      PT OT has recommended SNF and a SNF referral has been made      I have discussed this patient's plan of care and discharge plan at IDT rounds today with Case Management, Nursing, Nursing leadership, and other members of the IDT team.    Consultants/Specialty      Code Status  DNAR/DNI    Disposition  Patient is not medically cleared for discharge.   Anticipate discharge to to skilled nursing facility.  I have placed the appropriate orders for post-discharge needs.    Review of Systems  Review of Systems   Unable to perform ROS: Mental status change      Physical Exam  Temp:  [36.3 °C (97.3 °F)-36.8 °C (98.2 °F)] 36.7 °C (98 °F)  Pulse:  [85-97] 89  Resp:  [16-18] 17  BP: (128-161)/(80-95) 160/95  SpO2:  [91 %-97 %] 96 %    Physical Exam  Constitutional:       General: She is not in acute distress.     Appearance: She is ill-appearing. She is not toxic-appearing.   Eyes:      General:         Left eye: No discharge.      Extraocular Movements: Extraocular movements intact.      Pupils: Pupils are equal, round, and reactive to light.   Cardiovascular:      Rate and Rhythm: Normal rate and regular rhythm.      Pulses: Normal pulses.   Pulmonary:      Effort: No respiratory distress.      Breath sounds: No stridor. No wheezing, rhonchi or rales.   Chest:      Chest wall: No tenderness.   Abdominal:      General: Abdomen is flat. There is no distension.      Tenderness: There is no abdominal tenderness. There is no guarding.   Musculoskeletal:         General: No swelling, tenderness or deformity. Normal range of  motion.      Cervical back: Normal range of motion.      Right lower leg: No edema.   Skin:     General: Skin is warm.      Capillary Refill: Capillary refill takes 2 to 3 seconds.      Coloration: Skin is not jaundiced.      Findings: No bruising or lesion.   Neurological:      General: No focal deficit present.      Motor: Weakness present.      Comments: Periods of confusion   Psychiatric:         Mood and Affect: Mood normal.       Fluids    Intake/Output Summary (Last 24 hours) at 2/27/2023 1403  Last data filed at 2/27/2023 1200  Gross per 24 hour   Intake 480 ml   Output 1500 ml   Net -1020 ml         Laboratory  Recent Labs     02/25/23  0536 02/26/23  0323 02/27/23  0357   WBC 12.5* 10.8 10.4   RBC 4.87 4.92 4.96   HEMOGLOBIN 15.2 15.3 15.6   HEMATOCRIT 46.1 45.7 46.3   MCV 94.7 92.9 93.3   MCH 31.2 31.1 31.5   MCHC 33.0* 33.5* 33.7   RDW 48.0 48.1 48.7   PLATELETCT 133* 126* 143*   MPV 11.6 11.6 11.5       Recent Labs     02/25/23  0536 02/26/23  0323 02/27/23  0357   SODIUM 138 139 143   POTASSIUM 3.8 3.4* 4.0   CHLORIDE 103 104 105   CO2 22 27 27   GLUCOSE 117* 113* 106*   BUN 33* 23* 17   CREATININE 0.60 0.56 0.48*   CALCIUM 9.2 9.0 8.9                       Imaging  DX-CHEST-PORTABLE (1 VIEW)   Final Result      1.  Cardiac silhouette enlargement.   2.  Mild left basilar atelectasis.             Assessment/Plan  * Sepsis (HCC)- (present on admission)  Assessment & Plan  This is Sepsis Present on admission  SIRS criteria identified on my evaluation include: Fever, with temperature greater than 101 deg F, Tachycardia, with heart rate greater than 90 BPM, Leukocytosis, with WBC greater than 12,000 and Bandemia, greater than 10% bands  Source is UTI and lung  Sepsis protocol initiated  Fluid resuscitation ordered per protocol  Crystalloid Fluid Administration: Fluid resuscitation ordered per standard protocol - 30 mL/kg per current or ideal body weight  IV antibiotics as appropriate for source of  sepsis  Reassessment: I have reassessed the patient's hemodynamic status      IV Rocephin and p.o. doxycycline    Saline bolus 500 cc given on 2/24/2023    Lactic acid normalized on 2/26        Prolonged Q-T interval on ECG- (present on admission)  Assessment & Plan  Avoid QT prolonging drugs    Lactic acidosis- (present on admission)  Assessment & Plan  Due to sepsis.  IV fluids stopped on 2/26  Monitor vitals  And repeat labs    Thrombocytopenia (HCC)  Assessment & Plan  2/22 Plt:131  Monitor cbc      Elevated AST (SGOT)- (present on admission)  Assessment & Plan  No alcohol use per S.O.  Monitor cmp    UTI (urinary tract infection)- (present on admission)  Assessment & Plan  With evidence of bacteremia with E. Coli-treatment with antibiotics for total of 7 days-today 2/27/2023 is day #5 of 7  Monitor urine culture  Monitor I/O's, labs, vital.    Chronic back pain- (present on admission)  Assessment & Plan  Has zanaflex at home prn per her S.O.  Avoid narcotics as able.  lidoderm patch for now if needed.    Cognitive decline- (present on admission)  Assessment & Plan  Acutely worse per Shawn her significant other.  Monitor neurologic status  Treat sepsis and monitor.    Cervical spinal stenosis- (present on admission)  Assessment & Plan  lidoderm patch if needed.  Avoid narcotics         VTE prophylaxis: SCDs/TEDs    I have performed a physical exam and reviewed and updated ROS and Plan today (2/27/2023). In review of yesterday's note (2/26/2023), there are no changes except as documented above.

## 2023-02-27 NOTE — CARE PLAN
Report received, care assumed. Pt is AAOX2 verbalizes needs  Call light and belongings within reach, bed low and locked, bed alarm on, treaded socks on, frequent checks ongoing  No s/s of distress. VSS, 3 L NC     The patient is Stable - Low risk of patient condition declining or worsening    Shift Goals  Clinical Goals: PO abx, increase oral intake  Patient Goals: rest comfort  Family Goals: n/v    Progress made toward(s) clinical / shift goals:  Try to wean off oxygen, poc discussed, questions answered    Patient is not progressing towards the following goals:      Problem: Knowledge Deficit - Standard  Goal: Patient and family/care givers will demonstrate understanding of plan of care, disease process/condition, diagnostic tests and medications  Outcome: Progressing     Problem: Respiratory  Goal: Patient will achieve/maintain optimum respiratory ventilation and gas exchange  Outcome: Progressing     Problem: Skin Integrity  Goal: Skin integrity is maintained or improved  Outcome: Progressing
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
## 2023-02-28 VITALS
DIASTOLIC BLOOD PRESSURE: 93 MMHG | RESPIRATION RATE: 18 BRPM | HEIGHT: 64 IN | TEMPERATURE: 97.6 F | OXYGEN SATURATION: 92 % | HEART RATE: 98 BPM | WEIGHT: 129.63 LBS | SYSTOLIC BLOOD PRESSURE: 155 MMHG | BODY MASS INDEX: 22.13 KG/M2

## 2023-02-28 PROBLEM — B96.20 E COLI BACTEREMIA: Status: ACTIVE | Noted: 2023-02-23

## 2023-02-28 LAB
ANION GAP SERPL CALC-SCNC: 8 MMOL/L (ref 7–16)
BUN SERPL-MCNC: 14 MG/DL (ref 8–22)
CALCIUM SERPL-MCNC: 8.7 MG/DL (ref 8.5–10.5)
CHLORIDE SERPL-SCNC: 104 MMOL/L (ref 96–112)
CO2 SERPL-SCNC: 28 MMOL/L (ref 20–33)
CREAT SERPL-MCNC: 0.47 MG/DL (ref 0.5–1.4)
ERYTHROCYTE [DISTWIDTH] IN BLOOD BY AUTOMATED COUNT: 49.6 FL (ref 35.9–50)
GFR SERPLBLD CREATININE-BSD FMLA CKD-EPI: 93 ML/MIN/1.73 M 2
GLUCOSE SERPL-MCNC: 127 MG/DL (ref 65–99)
HCT VFR BLD AUTO: 44.4 % (ref 37–47)
HGB BLD-MCNC: 14.4 G/DL (ref 12–16)
MCH RBC QN AUTO: 30.8 PG (ref 27–33)
MCHC RBC AUTO-ENTMCNC: 32.4 G/DL (ref 33.6–35)
MCV RBC AUTO: 94.9 FL (ref 81.4–97.8)
PLATELET # BLD AUTO: 169 K/UL (ref 164–446)
PMV BLD AUTO: 11.3 FL (ref 9–12.9)
POTASSIUM SERPL-SCNC: 3.6 MMOL/L (ref 3.6–5.5)
RBC # BLD AUTO: 4.68 M/UL (ref 4.2–5.4)
SARS-COV+SARS-COV-2 AG RESP QL IA.RAPID: NOTDETECTED
SODIUM SERPL-SCNC: 140 MMOL/L (ref 135–145)
SPECIMEN SOURCE: NORMAL
WBC # BLD AUTO: 9.2 K/UL (ref 4.8–10.8)

## 2023-02-28 PROCEDURE — 700111 HCHG RX REV CODE 636 W/ 250 OVERRIDE (IP): Performed by: HOSPITALIST

## 2023-02-28 PROCEDURE — 80048 BASIC METABOLIC PNL TOTAL CA: CPT

## 2023-02-28 PROCEDURE — 99239 HOSP IP/OBS DSCHRG MGMT >30: CPT | Performed by: STUDENT IN AN ORGANIZED HEALTH CARE EDUCATION/TRAINING PROGRAM

## 2023-02-28 PROCEDURE — 87426 SARSCOV CORONAVIRUS AG IA: CPT

## 2023-02-28 PROCEDURE — A9270 NON-COVERED ITEM OR SERVICE: HCPCS | Performed by: HOSPITALIST

## 2023-02-28 PROCEDURE — 700102 HCHG RX REV CODE 250 W/ 637 OVERRIDE(OP): Performed by: HOSPITALIST

## 2023-02-28 PROCEDURE — 85027 COMPLETE CBC AUTOMATED: CPT

## 2023-02-28 PROCEDURE — 97530 THERAPEUTIC ACTIVITIES: CPT

## 2023-02-28 PROCEDURE — 36415 COLL VENOUS BLD VENIPUNCTURE: CPT

## 2023-02-28 PROCEDURE — 700101 HCHG RX REV CODE 250: Performed by: HOSPITALIST

## 2023-02-28 RX ORDER — GUAIFENESIN/DEXTROMETHORPHAN 100-10MG/5
5 SYRUP ORAL EVERY 6 HOURS PRN
Qty: 840 ML | Refills: 0 | Status: SHIPPED | OUTPATIENT
Start: 2023-02-28 | End: 2023-06-08

## 2023-02-28 RX ORDER — ACETAMINOPHEN 325 MG/1
650 TABLET ORAL EVERY 6 HOURS PRN
Qty: 30 TABLET | Refills: 0 | Status: SHIPPED | OUTPATIENT
Start: 2023-02-28 | End: 2023-06-08

## 2023-02-28 RX ADMIN — SENNOSIDES AND DOCUSATE SODIUM 2 TABLET: 50; 8.6 TABLET ORAL at 04:08

## 2023-02-28 RX ADMIN — CEFTRIAXONE SODIUM 2000 MG: 10 INJECTION, POWDER, FOR SOLUTION INTRAVENOUS at 14:45

## 2023-02-28 RX ADMIN — DOXYCYCLINE 100 MG: 100 TABLET, FILM COATED ORAL at 04:08

## 2023-02-28 ASSESSMENT — PAIN DESCRIPTION - PAIN TYPE
TYPE: ACUTE PAIN

## 2023-02-28 ASSESSMENT — COGNITIVE AND FUNCTIONAL STATUS - GENERAL
SUGGESTED CMS G CODE MODIFIER MOBILITY: CL
MOVING TO AND FROM BED TO CHAIR: A LOT
STANDING UP FROM CHAIR USING ARMS: A LOT
TURNING FROM BACK TO SIDE WHILE IN FLAT BAD: A LITTLE
CLIMB 3 TO 5 STEPS WITH RAILING: TOTAL
WALKING IN HOSPITAL ROOM: TOTAL
MOBILITY SCORE: 10
MOVING FROM LYING ON BACK TO SITTING ON SIDE OF FLAT BED: UNABLE

## 2023-02-28 ASSESSMENT — GAIT ASSESSMENTS: GAIT LEVEL OF ASSIST: UNABLE TO PARTICIPATE

## 2023-02-28 NOTE — DISCHARGE SUMMARY
Discharge Summary    CHIEF COMPLAINT ON ADMISSION  Chief Complaint   Patient presents with    N/V     X 1 week    Weakness     X 1 week       Reason for Admission  EMS    Admission Date  2/22/2023     CODE STATUS  DNAR/DNI    HPI & HOSPITAL COURSE  87-year-old female was admitted on 2/22/2023 for sepsis later on noted to be secondary to E. coli on urine cultures and bacteremia as well as altered mental status.  Patient was started on IV antibiotics.  She completed a 7-day regimen of IV Rocephin for E. coli bacteremia for which cultures were sensitive to.  Her mental status improved.  As per PT/OT evaluation, patient is to be discharged to skilled nursing facility for continuity of therapy and care.    Therefore, she is discharged in good and stable condition to skilled nursing facility.    The patient met 2-midnight criteria for an inpatient stay at the time of discharge.      FOLLOW UP ITEMS POST DISCHARGE  Acute nursing facility to follow posthospital discharge care    DISCHARGE DIAGNOSES  Principal Problem:    E coli bacteremia POA: Yes  Active Problems:    Cervical spinal stenosis POA: Yes    Sepsis (HCC) POA: Yes    Cognitive decline POA: Yes    Chronic back pain POA: Yes    UTI (urinary tract infection) POA: Yes    Elevated AST (SGOT) POA: Yes    Thrombocytopenia (HCC) POA: Yes    Lactic acidosis POA: Yes    Sepsis due to pneumonia (HCC) POA: Unknown    Prolonged Q-T interval on ECG POA: Yes  Resolved Problems:    * No resolved hospital problems. *      FOLLOW UP  No future appointments.  [facility]  [address] [address]  [phone]          MEDICATIONS ON DISCHARGE     Medication List        ASK your doctor about these medications        Instructions   tizanidine 2 MG tablet  Commonly known as: ZANAFLEX   Take 2 mg by mouth 1 time a day as needed.  Dose: 2 mg              Allergies  Allergies   Allergen Reactions    Sulfa Drugs        DIET  Orders Placed This Encounter    Procedures    Diet Order Diet: Level 6 - Soft and Bite Sized; Liquid level: Level 0 - Thin; Tray Modifications (optional): SLP - 1:1 Supervision by Nursing     Standing Status:   Standing     Number of Occurrences:   1     Order Specific Question:   Diet:     Answer:   Level 6 - Soft and Bite Sized [23]     Order Specific Question:   Liquid level     Answer:   Level 0 - Thin     Order Specific Question:   Tray Modifications (optional)     Answer:   SLP - 1:1 Supervision by Nursing       ACTIVITY  As tolerated.  Weight bearing as tolerated    LINES, DRAINS, AND WOUNDS  This is an automated list. Peripheral IVs will be removed prior to discharge.  Peripheral IV 02/25/23 22 G Left;Posterior Forearm (Active)   Site Assessment Clean;Dry;Intact 02/28/23 0800   Dressing Type Transparent 02/28/23 0800   Line Status Saline locked 02/28/23 0800   Dressing Status Clean;Dry;Intact 02/28/23 0800   Dressing Intervention N/A 02/28/23 0800   Dressing Change Due 03/04/23 02/28/23 0800   Infiltration Grading ([facility], CV) 0 02/28/23 0800   Phlebitis Scale ([facility] Only) 0 02/28/23 0800          Peripheral IV 02/25/23 22 G Left;Posterior Forearm (Active)   Site Assessment Clean;Dry;Intact 02/28/23 0800   Dressing Type Transparent 02/28/23 0800   Line Status Saline locked 02/28/23 0800   Dressing Status Clean;Dry;Intact 02/28/23 0800   Dressing Intervention N/A 02/28/23 0800   Dressing Change Due 03/04/23 02/28/23 0800   Infiltration Grading ([facility], CV) 0 02/28/23 0800   Phlebitis Scale ([facility] Only) 0 02/28/23 0800               MENTAL STATUS ON TRANSFER  At baseline     CONSULTATIONS  None    PROCEDURES  None    LABORATORY  Lab Results   Component Value Date    SODIUM 140 02/28/2023    POTASSIUM 3.6 02/28/2023    CHLORIDE 104 02/28/2023    CO2 28 02/28/2023    GLUCOSE 127 (H) 02/28/2023    BUN 14 02/28/2023    CREATININE 0.47 (L) 02/28/2023        Lab Results   Component Value Date    WBC 9.2 02/28/2023    HEMOGLOBIN 14.4  02/28/2023    HEMATOCRIT 44.4 02/28/2023    PLATELETCT 169 02/28/2023        Total time of the discharge process exceeds 37 minutes.

## 2023-02-28 NOTE — PROGRESS NOTES
Received report and assumed care of patient at change of shift. Patient is A&Ox2, oriented to person and place, on 3L O2, and reports no pain at this time. Patient assessment completed, bed in lowest position, and call light and personal belongings are within reach. Patient expressed no further needs at this time.

## 2023-02-28 NOTE — ASSESSMENT & PLAN NOTE
Noted on 2/22/2023 blood cultures x2  Only resistant to ampicillin  Secondary to urinary tract infection  Complete Rocephin antibiotic regimen on 2/28  Labs on a.m.

## 2023-02-28 NOTE — DISCHARGE PLANNING
Per request, referral sent to [facility] [place] and [facility]    1423- Spoke To: Elizabeth  Agency/Facility Name: [facility]   Plan or Request: accepted, bed available today    1430- Transport time 1630 going to [facility]

## 2023-02-28 NOTE — CARE PLAN
Report received, care assumed. Pt is AAOX1-2  Verbalizes needs and does not call for help  Pt repositions self  No s/s of distress noted, denies pain.  VSS on 3L NC  Fall precautions in place, bed low and locked, call light within reach, treaded socks on, bed alarm on.           The patient is Stable - Low risk of patient condition declining or worsening    Shift Goals  Clinical Goals: abx therapy  Patient Goals: safety  Family Goals: n/v    Progress made toward(s) clinical / shift goals:  cont to monitor resp status, skin integ, safety    Patient is not progressing towards the following goals:      Problem: Knowledge Deficit - Standard  Goal: Patient and family/care givers will demonstrate understanding of plan of care, disease process/condition, diagnostic tests and medications  Outcome: Progressing     Problem: Hemodynamics  Goal: Patient's hemodynamics, fluid balance and neurologic status will be stable or improve  Outcome: Progressing     Problem: Respiratory  Goal: Patient will achieve/maintain optimum respiratory ventilation and gas exchange  Outcome: Progressing

## 2023-02-28 NOTE — THERAPY
"Physical Therapy   Daily Treatment     Patient Name: Chava Bray  Age:  87 y.o., Sex:  female  Medical Record #: [id number]  Today's Date: 2/28/2023     Precautions  Precautions: Fall Risk;Swallow Precautions    Assessment    Pt received in bed and agreeable to PT session. Pt required min A for bed mobility and mod A with stand step transfer to a chair. Pt required cues for sequencing the tasks and assist to maintain balance throughout. Pt was pleasant and cooperative. Unable to progress with ambulation due to difficulty with advancing LE. Recommending placement for further rehab prior to return home given current assist needs. Will follow for acute PT to progress as able.    Plan    Treatment Plan Status: Continue Current Treatment Plan  Type of Treatment: Bed Mobility, Equipment, Group Therapy, Gait Training, Manual Therapy, Neuro Re-Education / Balance, Self Care / Home Evaluation, Stair Training, Therapeutic Activities, Therapeutic Exercise  Treatment Frequency: 3 Times per Week  Treatment Duration: Until Therapy Goals Met    DC Equipment Recommendations: Unable to determine at this time  Discharge Recommendations: Recommend post-acute placement for additional physical therapy services prior to discharge home    Subjective    \"I would love to get out of bed\"     Objective       02/28/23 0901   Precautions   Precautions Fall Risk;Swallow Precautions   Vitals   O2 (LPM) 3   O2 Delivery Device Nasal Cannula   Pain 0 - 10 Group   Therapist Pain Assessment Post Activity Pain Same as Prior to Activity;Nurse Notified   Cognition    Level of Consciousness Alert   Comments Pleasant and cooperative. Decreased insight into deficits.   Balance   Sitting Balance (Static) Fair   Sitting Balance (Dynamic) Fair -   Standing Balance (Static) Poor +   Standing Balance (Dynamic) Trace +   Weight Shift Sitting Fair   Weight Shift Standing Poor   Skilled Intervention Verbal Cuing;Tactile Cuing;Facilitation   Comments Required " facilitation for weight shifting to pivot to a chair   Bed Mobility    Supine to Sit Minimal Assist   Scooting Minimal Assist   Skilled Intervention Verbal Cuing   Gait Analysis   Gait Level Of Assist Unable to Participate   Functional Mobility   Sit to Stand Moderate Assist   Bed, Chair, Wheelchair Transfer Moderate Assist   Transfer Method Stand Step   Mobility bed>chair   Skilled Intervention Verbal Cuing;Tactile Cuing;Sequencing;Facilitation   Comments STSx3. On first 2 attempts to transfer, assisted pt at one side with cues to reach and take steps. Pt unable to do this. Switched to standing in front of pt and assisting at BLE and with weight shifting to transfer.   How much difficulty does the patient currently have...   Turning over in bed (including adjusting bedclothes, sheets and blankets)? 3   Sitting down on and standing up from a chair with arms (e.g., wheelchair, bedside commode, etc.) 1   Moving from lying on back to sitting on the side of the bed? 2   How much help from another person does the patient currently need...   Moving to and from a bed to a chair (including a wheelchair)? 2   Need to walk in a hospital room? 1   Climbing 3-5 steps with a railing? 1   6 clicks Mobility Score 10   Short Term Goals    Short Term Goal # 1 Pt will perform sit<>supine with supervision within 6 visits   Goal Outcome # 1 goal not met   Short Term Goal # 2 Pt will perform sit<>stand and stand pivot transfers with supervision and LRAD within 6 visits   Goal Outcome # 2 Goal not met   Short Term Goal # 3 Pt will ambulate 100' with LRAD and CGA within 6 visits   Goal Outcome # 3 Goal not met   Physical Therapy Treatment Plan   Physical Therapy Treatment Plan Continue Current Treatment Plan   Anticipated Discharge Equipment and Recommendations   DC Equipment Recommendations Unable to determine at this time   Discharge Recommendations Recommend post-acute placement for additional physical therapy services prior to  discharge home   Interdisciplinary Plan of Care Collaboration   IDT Collaboration with  Nursing   Patient Position at End of Therapy Seated;Chair Alarm On;Call Light within Reach;Tray Table within Reach   Collaboration Comments RN updated

## 2023-02-28 NOTE — DISCHARGE PLANNING
Care Transition Team Final Discharge Disposition    Actual Discharge Information  Discharge Disposition: D/T to SNF with Medicare cert in anticipation of skilled care (03) ([facility] [place])    Patient accepted to [facility]. CARY placed call to Shawn (CITLALI) and updated. He is in agreement with her going to [place]. He reported that they have received 3 feet of snow over they last 24 hours and will be unable to come down to [place] until the travel ban is lifted. Shawn updated on transfer time of 1630. Shawn asked CARY to confirm that [place] has his phone and . CARY placed patient identification stickers on phone and phone  which are in patients purse.     CARY met with Georgia at bedside and updated here on this CM conversation with Shawn.     CARY completed COBRA and transfer packet which was provided to bedside nurse.     CM available as needed.

## 2023-02-28 NOTE — DISCHARGE PLANNING
Spoke To: Tete  Agency/Facility Name: [facility]  Plan or Request: declined / no open beds    1155-   Agency/Facility Name: [facility]  Plan or Request: KUMAR left  for [place]    1220- Tom at [facility] returned call, she would like to come for an onsite visit tomorrow.

## 2023-02-28 NOTE — PROGRESS NOTES
Hospital Medicine Daily Progress Note    Date of Service  2/28/2023    Chief Complaint  Chava Bray is a 87 y.o. female admitted 2/22/2023 with confusion    Hospital Course  87 y.o. female who presented 2/22/2023 with altered mentation with weakness and lethargy.  Per her significant other she has been having worsening mentation and weakness over the past week.  There was report of vomiting twice today as well.  Patient stated she has had abominal pain and N/V for one month.  She has some confusion but was alert to city, place but could not tell me the year or month, she thought it was December.       In the ED she was hypoxic and placed on 3L O2. She had a mild cough but CXR was unremarkable.  WBC:13.9 and tachycardic with rapid respiratory rate initially.  Urinanalysis was positive for UTI.      Interval Problem Update  Patient was evaluated bedside  Patient no acute distress this morning, sitting in bed  Stable vitals  Requiring 3 L to maintain saturation  Stable CBC and chemistry  2/22/2023 urine and blood cultures growing E. coli resistant to ampicillin only  Patient completes 7-day IV Rocephin regimen today  PT/OT recommending SNF  SNF referral placed    I have discussed this patient's plan of care and discharge plan at IDT rounds today with Case Management, Nursing, Nursing leadership, and other members of the IDT team.    Consultants/Specialty  None    Code Status  DNAR/DNI    Disposition  Patient is medically cleared for discharge.   Anticipate discharge to to skilled nursing facility.  I have placed the appropriate orders for post-discharge needs.    Review of Systems  Review of Systems   Unable to perform ROS: Mental status change      Physical Exam  Temp:  [36.4 °C (97.6 °F)-37.2 °C (98.9 °F)] 36.4 °C (97.6 °F)  Pulse:  [55-98] 98  Resp:  [17-20] 18  BP: (126-191)/(65-93) 155/93  SpO2:  [92 %-95 %] 92 %    Physical Exam  Constitutional:       General: She is not in acute distress.      Appearance: She is ill-appearing. She is not toxic-appearing.   Eyes:      General:         Left eye: No discharge.      Extraocular Movements: Extraocular movements intact.      Pupils: Pupils are equal, round, and reactive to light.   Cardiovascular:      Rate and Rhythm: Normal rate and regular rhythm.      Pulses: Normal pulses.   Pulmonary:      Effort: No respiratory distress.      Breath sounds: No stridor. No wheezing, rhonchi or rales.   Chest:      Chest wall: No tenderness.   Abdominal:      General: Abdomen is flat. There is no distension.      Tenderness: There is no abdominal tenderness. There is no guarding.   Musculoskeletal:         General: No swelling, tenderness or deformity. Normal range of motion.      Cervical back: Normal range of motion.      Right lower leg: No edema.   Skin:     General: Skin is warm.      Capillary Refill: Capillary refill takes 2 to 3 seconds.      Coloration: Skin is not jaundiced.      Findings: No bruising or lesion.   Neurological:      General: No focal deficit present.      Motor: Weakness present.      Comments: Periods of confusion   Psychiatric:         Mood and Affect: Mood normal.       Fluids    Intake/Output Summary (Last 24 hours) at 2/28/2023 1405  Last data filed at 2/28/2023 1300  Gross per 24 hour   Intake 177 ml   Output --   Net 177 ml       Laboratory  Recent Labs     02/26/23  0323 02/27/23  0357 02/28/23  0053   WBC 10.8 10.4 9.2   RBC 4.92 4.96 4.68   HEMOGLOBIN 15.3 15.6 14.4   HEMATOCRIT 45.7 46.3 44.4   MCV 92.9 93.3 94.9   MCH 31.1 31.5 30.8   MCHC 33.5* 33.7 32.4*   RDW 48.1 48.7 49.6   PLATELETCT 126* 143* 169   MPV 11.6 11.5 11.3     Recent Labs     02/26/23  0323 02/27/23  0357 02/28/23  0053   SODIUM 139 143 140   POTASSIUM 3.4* 4.0 3.6   CHLORIDE 104 105 104   CO2 27 27 28   GLUCOSE 113* 106* 127*   BUN 23* 17 14   CREATININE 0.56 0.48* 0.47*   CALCIUM 9.0 8.9 8.7                     Imaging  DX-CHEST-PORTABLE (1 VIEW)   Final Result       1.  Cardiac silhouette enlargement.   2.  Mild left basilar atelectasis.             Assessment/Plan  * E coli bacteremia- (present on admission)  Assessment & Plan  Noted on 2/22/2023 blood cultures x2  Only resistant to ampicillin  Secondary to urinary tract infection  Complete Rocephin antibiotic regimen on 2/28  Labs on a.m.    Prolonged Q-T interval on ECG- (present on admission)  Assessment & Plan  Avoid QT prolonging drugs    Lactic acidosis- (present on admission)  Assessment & Plan  Resolved    Thrombocytopenia (HCC)- (present on admission)  Assessment & Plan  2/22 Plt:131  Monitor cbc    Elevated AST (SGOT)- (present on admission)  Assessment & Plan  No alcohol use per S.O.  Monitor cmp    UTI (urinary tract infection)- (present on admission)  Assessment & Plan  With evidence of bacteremia with E. Coli-treatment with antibiotics for total of 7 days-today 2/27/2023 is day #5 of 7  Monitor urine culture  Monitor I/O's, labs, vital.    Chronic back pain- (present on admission)  Assessment & Plan  Has zanaflex at home prn per her S.O.  Avoid narcotics as able.  lidoderm patch for now if needed.    Cognitive decline- (present on admission)  Assessment & Plan  Acutely worse per Shawn her significant other.  Monitor neurologic status  Treat sepsis and monitor.    Sepsis (HCC)- (present on admission)  Assessment & Plan  Resolved    Cervical spinal stenosis- (present on admission)  Assessment & Plan  lidoderm patch if needed.  Avoid narcotics         VTE prophylaxis: SCDs/TEDs    I have performed a physical exam and reviewed and updated ROS and Plan today (2/28/2023). In review of yesterday's note (2/27/2023), there are no changes except as documented above.

## 2023-05-18 NOTE — ED TRIAGE NOTES
"Chief Complaint   Patient presents with    N/V     X 1 week    Weakness     X 1 week     Pt BIBA from home for N/V and generalized weakness for 1 week.  Per EMS temperature was 102 F currently 99.7.  Oxygen saturation 80's and pt placed on 3 L NC. 800 ml NS given and 4 zofran IV.  FS 85. Per , pt increasing confusion and weakness.  Pt currently AOx 2. Pt reports 2 episodes of emesis today. Denies diarrhea or exposure to sick persons.  EKG ST.    Blood Pressure : 132/63, Pulse: (!) 106, Respiration: 20, Temperature: 37.6 °C (99.7 °F), Height: 162.6 cm (5' 4\"), Weight: 61.2 kg (135 lb), BMI (Calculated): 23.17, BSA (Calculated): 1.7, Pulse Oximetry: 94 %, O2 (LPM): 3, O2 Delivery Device: Nasal Cannula    " pcp

## 2023-06-08 ENCOUNTER — HOSPITAL ENCOUNTER (OUTPATIENT)
Dept: RADIOLOGY | Facility: MEDICAL CENTER | Age: 88
End: 2023-06-08

## 2023-06-08 ENCOUNTER — APPOINTMENT (OUTPATIENT)
Dept: RADIOLOGY | Facility: MEDICAL CENTER | Age: 88
DRG: 522 | End: 2023-06-08
Attending: EMERGENCY MEDICINE
Payer: MEDICARE

## 2023-06-08 ENCOUNTER — HOSPITAL ENCOUNTER (INPATIENT)
Facility: MEDICAL CENTER | Age: 88
LOS: 4 days | DRG: 522 | End: 2023-06-12
Attending: EMERGENCY MEDICINE | Admitting: STUDENT IN AN ORGANIZED HEALTH CARE EDUCATION/TRAINING PROGRAM
Payer: MEDICARE
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
## 2023-06-08 DIAGNOSIS — S72.002A LEFT DISPLACED FEMORAL NECK FRACTURE (HCC): ICD-10-CM
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
## 2023-06-08 PROBLEM — D72.829 LEUKOCYTOSIS: Status: ACTIVE | Noted: 2023-06-08

## 2023-06-08 PROBLEM — Z71.89 ADVANCE CARE PLANNING: Status: ACTIVE | Noted: 2023-06-08

## 2023-06-08 LAB
ALBUMIN SERPL BCP-MCNC: 3.7 G/DL (ref 3.2–4.9)
ALBUMIN/GLOB SERPL: 1.1 G/DL
ALP SERPL-CCNC: 107 U/L (ref 30–99)
ALT SERPL-CCNC: 12 U/L (ref 2–50)
ANION GAP SERPL CALC-SCNC: 12 MMOL/L (ref 7–16)
APTT PPP: 31.8 SEC (ref 24.7–36)
AST SERPL-CCNC: 15 U/L (ref 12–45)
BASOPHILS # BLD AUTO: 0.5 % (ref 0–1.8)
BASOPHILS # BLD: 0.06 K/UL (ref 0–0.12)
BILIRUB SERPL-MCNC: 1.3 MG/DL (ref 0.1–1.5)
BUN SERPL-MCNC: 20 MG/DL (ref 8–22)
CALCIUM ALBUM COR SERPL-MCNC: 9.5 MG/DL (ref 8.5–10.5)
CALCIUM SERPL-MCNC: 9.3 MG/DL (ref 8.5–10.5)
CHLORIDE SERPL-SCNC: 104 MMOL/L (ref 96–112)
CO2 SERPL-SCNC: 25 MMOL/L (ref 20–33)
CREAT SERPL-MCNC: 0.7 MG/DL (ref 0.5–1.4)
EKG IMPRESSION: NORMAL
EOSINOPHIL # BLD AUTO: 0.32 K/UL (ref 0–0.51)
EOSINOPHIL NFR BLD: 2.9 % (ref 0–6.9)
ERYTHROCYTE [DISTWIDTH] IN BLOOD BY AUTOMATED COUNT: 47.8 FL (ref 35.9–50)
GFR SERPLBLD CREATININE-BSD FMLA CKD-EPI: 84 ML/MIN/1.73 M 2
GLOBULIN SER CALC-MCNC: 3.4 G/DL (ref 1.9–3.5)
GLUCOSE SERPL-MCNC: 107 MG/DL (ref 65–99)
HCT VFR BLD AUTO: 46.8 % (ref 37–47)
HGB BLD-MCNC: 15 G/DL (ref 12–16)
IMM GRANULOCYTES # BLD AUTO: 0.03 K/UL (ref 0–0.11)
IMM GRANULOCYTES NFR BLD AUTO: 0.3 % (ref 0–0.9)
INR PPP: 1.11 (ref 0.87–1.13)
LYMPHOCYTES # BLD AUTO: 1.75 K/UL (ref 1–4.8)
LYMPHOCYTES NFR BLD: 16 % (ref 22–41)
MCH RBC QN AUTO: 30.1 PG (ref 27–33)
MCHC RBC AUTO-ENTMCNC: 32.1 G/DL (ref 32.2–35.5)
MCV RBC AUTO: 93.8 FL (ref 81.4–97.8)
MONOCYTES # BLD AUTO: 0.87 K/UL (ref 0–0.85)
MONOCYTES NFR BLD AUTO: 8 % (ref 0–13.4)
NEUTROPHILS # BLD AUTO: 7.91 K/UL (ref 1.82–7.42)
NEUTROPHILS NFR BLD: 72.3 % (ref 44–72)
NRBC # BLD AUTO: 0 K/UL
NRBC BLD-RTO: 0 /100 WBC (ref 0–0.2)
PLATELET # BLD AUTO: 190 K/UL (ref 164–446)
PMV BLD AUTO: 11.2 FL (ref 9–12.9)
POTASSIUM SERPL-SCNC: 3.8 MMOL/L (ref 3.6–5.5)
PROT SERPL-MCNC: 7.1 G/DL (ref 6–8.2)
PROTHROMBIN TIME: 14.1 SEC (ref 12–14.6)
RBC # BLD AUTO: 4.99 M/UL (ref 4.2–5.4)
SODIUM SERPL-SCNC: 141 MMOL/L (ref 135–145)
WBC # BLD AUTO: 10.9 K/UL (ref 4.8–10.8)

## 2023-06-08 PROCEDURE — 93005 ELECTROCARDIOGRAM TRACING: CPT | Performed by: EMERGENCY MEDICINE

## 2023-06-08 PROCEDURE — 99497 ADVNCD CARE PLAN 30 MIN: CPT | Performed by: STUDENT IN AN ORGANIZED HEALTH CARE EDUCATION/TRAINING PROGRAM

## 2023-06-08 PROCEDURE — 71045 X-RAY EXAM CHEST 1 VIEW: CPT

## 2023-06-08 PROCEDURE — 770006 HCHG ROOM/CARE - MED/SURG/GYN SEMI*

## 2023-06-08 PROCEDURE — 99285 EMERGENCY DEPT VISIT HI MDM: CPT

## 2023-06-08 PROCEDURE — 85730 THROMBOPLASTIN TIME PARTIAL: CPT

## 2023-06-08 PROCEDURE — 85610 PROTHROMBIN TIME: CPT

## 2023-06-08 PROCEDURE — 80053 COMPREHEN METABOLIC PANEL: CPT

## 2023-06-08 PROCEDURE — A9270 NON-COVERED ITEM OR SERVICE: HCPCS | Performed by: STUDENT IN AN ORGANIZED HEALTH CARE EDUCATION/TRAINING PROGRAM

## 2023-06-08 PROCEDURE — 99223 1ST HOSP IP/OBS HIGH 75: CPT | Mod: AI,25 | Performed by: STUDENT IN AN ORGANIZED HEALTH CARE EDUCATION/TRAINING PROGRAM

## 2023-06-08 PROCEDURE — 72170 X-RAY EXAM OF PELVIS: CPT

## 2023-06-08 PROCEDURE — 700102 HCHG RX REV CODE 250 W/ 637 OVERRIDE(OP): Performed by: STUDENT IN AN ORGANIZED HEALTH CARE EDUCATION/TRAINING PROGRAM

## 2023-06-08 PROCEDURE — 36415 COLL VENOUS BLD VENIPUNCTURE: CPT

## 2023-06-08 PROCEDURE — 73552 X-RAY EXAM OF FEMUR 2/>: CPT | Mod: LT

## 2023-06-08 PROCEDURE — 85025 COMPLETE CBC W/AUTO DIFF WBC: CPT

## 2023-06-08 PROCEDURE — 700111 HCHG RX REV CODE 636 W/ 250 OVERRIDE (IP): Performed by: STUDENT IN AN ORGANIZED HEALTH CARE EDUCATION/TRAINING PROGRAM

## 2023-06-08 RX ORDER — ONDANSETRON 2 MG/ML
4 INJECTION INTRAMUSCULAR; INTRAVENOUS EVERY 4 HOURS PRN
Status: DISCONTINUED | OUTPATIENT
Start: 2023-06-08 | End: 2023-06-12 | Stop reason: HOSPADM

## 2023-06-08 RX ORDER — ONDANSETRON 4 MG/1
4 TABLET, ORALLY DISINTEGRATING ORAL EVERY 4 HOURS PRN
Status: DISCONTINUED | OUTPATIENT
Start: 2023-06-08 | End: 2023-06-12 | Stop reason: HOSPADM

## 2023-06-08 RX ORDER — POLYETHYLENE GLYCOL 3350 17 G/17G
1 POWDER, FOR SOLUTION ORAL
Status: DISCONTINUED | OUTPATIENT
Start: 2023-06-08 | End: 2023-06-12 | Stop reason: HOSPADM

## 2023-06-08 RX ORDER — AMOXICILLIN 250 MG
2 CAPSULE ORAL 2 TIMES DAILY
Status: DISCONTINUED | OUTPATIENT
Start: 2023-06-08 | End: 2023-06-12 | Stop reason: HOSPADM

## 2023-06-08 RX ORDER — MORPHINE SULFATE 4 MG/ML
2 INJECTION INTRAVENOUS
Status: DISCONTINUED | OUTPATIENT
Start: 2023-06-08 | End: 2023-06-12 | Stop reason: HOSPADM

## 2023-06-08 RX ORDER — ACETAMINOPHEN 325 MG/1
650 TABLET ORAL EVERY 6 HOURS PRN
Status: DISCONTINUED | OUTPATIENT
Start: 2023-06-08 | End: 2023-06-10

## 2023-06-08 RX ORDER — BISACODYL 10 MG
10 SUPPOSITORY, RECTAL RECTAL
Status: DISCONTINUED | OUTPATIENT
Start: 2023-06-08 | End: 2023-06-12 | Stop reason: HOSPADM

## 2023-06-08 RX ORDER — ACETAMINOPHEN 325 MG/1
325 TABLET ORAL EVERY 6 HOURS PRN
Status: ON HOLD | COMMUNITY
End: 2023-06-12

## 2023-06-08 RX ORDER — OXYCODONE HYDROCHLORIDE 5 MG/1
2.5 TABLET ORAL
Status: DISCONTINUED | OUTPATIENT
Start: 2023-06-08 | End: 2023-06-12 | Stop reason: HOSPADM

## 2023-06-08 RX ORDER — OXYCODONE HYDROCHLORIDE 5 MG/1
5 TABLET ORAL
Status: DISCONTINUED | OUTPATIENT
Start: 2023-06-08 | End: 2023-06-12 | Stop reason: HOSPADM

## 2023-06-08 RX ORDER — IBUPROFEN 200 MG
400 TABLET ORAL EVERY 6 HOURS PRN
Status: ON HOLD | COMMUNITY
End: 2023-06-12

## 2023-06-08 RX ADMIN — OXYCODONE 5 MG: 5 TABLET ORAL at 21:37

## 2023-06-08 RX ADMIN — OXYCODONE 5 MG: 5 TABLET ORAL at 11:40

## 2023-06-08 RX ADMIN — OXYCODONE 5 MG: 5 TABLET ORAL at 15:00

## 2023-06-08 RX ADMIN — MORPHINE SULFATE 2 MG: 4 INJECTION INTRAVENOUS at 22:59

## 2023-06-08 ASSESSMENT — PAIN DESCRIPTION - PAIN TYPE
TYPE: ACUTE PAIN

## 2023-06-08 ASSESSMENT — ENCOUNTER SYMPTOMS
FALLS: 1
WEAKNESS: 1

## 2023-06-08 ASSESSMENT — FIBROSIS 4 INDEX: FIB4 SCORE: 4.02

## 2023-06-08 NOTE — ED NOTES
Report called to Mickie SMALL.   Pt resting in [facility]. No needs.   No orders for a diet placed. Message sent to MD.

## 2023-06-08 NOTE — ED NOTES
Pt oriented for the fourth time in this hour to the fact that we cannot give her water.   Message sent to MD Anne in hopes to have MD discuss plan of care with pt. Pt at this time thinks 'Shawn' is going to be picking her up. Even after purewick placement pt has again been oriented that she's ok to urinate on own once ready to.   MD states will be down to have chat with pt.

## 2023-06-08 NOTE — ED NOTES
Pt on call light for second time in 20 minutes. States she would like to be repositioned once more. Additional mouth swab given. Pt states pain is much better than prior. No other needs.

## 2023-06-08 NOTE — ED TRIAGE NOTES
Chief Complaint   Patient presents with    Hip Pain     GLF last night with L hip pain. Pt brought to [facility] last night and transferred here this AM

## 2023-06-08 NOTE — ASSESSMENT & PLAN NOTE
Full Code: I discussed code status with patient . She has medical decision capacity. She wishes to have all life sustaining efforts if needed.   I discussed advance care planning with the patient for 16 minutes, including diagnosis, prognosis, plan of care, risks and benefits of any therapies that could be offered, as well as alternatives including palliation and hospice, as appropriate.

## 2023-06-08 NOTE — ED NOTES
"Pt slid down in bed stating \"everyone else left so I need to go too\"   Once again oriented pt to the fact that she's at [facility] and will be going into surgery tomorrow. Position adjusted in bed. New glass of water provided. No other needs.   "

## 2023-06-08 NOTE — H&P
Hospital Medicine History & Physical Note    Date of Service  6/8/2023    Primary Care Physician  Francia Padilla M.D.    Consultants  orthopedics    Specialist Names: Dr. Becker    Code Status  Full Code    Chief Complaint  Chief Complaint   Patient presents with    Hip Pain     GLF last night with L hip pain. Pt brought to incline last night and transferred here this AM        History of Presenting Illness  Chava Bray is a 87 y.o. female who presented 6/8/2023 with GLF fall with L hip pain. Patient reports she was tripped and fell a couple days ago and resulting L hip pain, unable to ambulate. Denies fever, chills, chest pain, sob, LOC or head injury.   Here CBC and BMP unremarkable except mild leukocytosis with wbc 10.9, left femur and pelvis xray notes Mild displaced subcapital left femoral neck fracture .  Case was discussed with orthopedics.  Dr. Becker will see the patient.    I discussed the plan of care with patient and ERP Dr. Mendoza  .    Review of Systems  Review of Systems   Musculoskeletal:  Positive for falls and joint pain.   Neurological:  Positive for weakness.   All other systems reviewed and are negative.      Past Medical History   has a past medical history of Arthritis, Headache(784.0), Headache, classical migraine, Hyperlipidemia, Osteoporosis, unspecified, and Thyroid disease.    Surgical History   has no past surgical history on file.     Family History  family history is not on file.   Family history reviewed with patient. There is no family history that is pertinent to the chief complaint.     Social History   reports that she has never smoked. She has never used smokeless tobacco. She reports current alcohol use. She reports that she does not use drugs.    Allergies  Allergies   Allergen Reactions    Sulfa Drugs Unspecified     Unknown childhood reaction        Medications  Prior to Admission Medications   Prescriptions Last Dose Informant Patient Reported? Taking?   VITAMIN D  PO ABOUT 1 WEEK at OUT Patient Yes Yes   Sig: Take 1 Tablet by mouth every day.   acetaminophen (TYLENOL) 325 MG Tab FEW DAYS AGO at UNK Patient Yes Yes   Sig: Take 325 mg by mouth every 6 hours as needed. Indications: Pain   ibuprofen (MOTRIN) 200 MG Tab 6/8/2023 at AM Patient Yes Yes   Sig: Take 400 mg by mouth every 6 hours as needed. Indications: Pain      Facility-Administered Medications: None       Physical Exam  Pulse:  [72-74] 72  BP: (105-111)/(60-64) 111/64  SpO2:  [91 %-97 %] 97 %  Blood Pressure : 111/64       Pulse: 72       Pulse Oximetry: 97 %       Physical Exam  Vitals reviewed.   Constitutional:       Appearance: Normal appearance.   HENT:      Head: Normocephalic and atraumatic.      Nose: Nose normal.      Mouth/Throat:      Pharynx: Oropharynx is clear.   Eyes:      Extraocular Movements: Extraocular movements intact.      Conjunctiva/sclera: Conjunctivae normal.      Pupils: Pupils are equal, round, and reactive to light.   Cardiovascular:      Rate and Rhythm: Normal rate and regular rhythm.      Pulses: Normal pulses.      Heart sounds: Normal heart sounds.   Pulmonary:      Effort: Pulmonary effort is normal.      Breath sounds: Normal breath sounds.   Abdominal:      General: Abdomen is flat. Bowel sounds are normal.      Palpations: Abdomen is soft.   Musculoskeletal:         General: Tenderness (L hip with limited ROM due to pain) present.      Cervical back: Normal range of motion and neck supple.   Skin:     General: Skin is warm and dry.   Neurological:      General: No focal deficit present.      Mental Status: She is alert and oriented to person, place, and time. Mental status is at baseline.   Psychiatric:         Mood and Affect: Mood normal.         Behavior: Behavior normal.         Laboratory:  Recent Labs     06/08/23  0859   WBC 10.9*   RBC 4.99   HEMOGLOBIN 15.0   HEMATOCRIT 46.8   MCV 93.8   MCH 30.1   MCHC 32.1*   RDW 47.8   PLATELETCT 190   MPV 11.2     Recent Labs      06/08/23  0859   SODIUM 141   POTASSIUM 3.8   CHLORIDE 104   CO2 25   GLUCOSE 107*   BUN 20   CREATININE 0.70   CALCIUM 9.3     Recent Labs     06/08/23  0859   ALTSGPT 12   ASTSGOT 15   ALKPHOSPHAT 107*   TBILIRUBIN 1.3   GLUCOSE 107*     Recent Labs     06/08/23  0859   APTT 31.8   INR 1.11     No results for input(s): NTPROBNP in the last 72 hours.      No results for input(s): TROPONINT in the last 72 hours.    Imaging:  DX-FEMUR-2+ LEFT   Final Result      Mild displaced subcapital left femoral neck fracture      DX-PELVIS-1 OR 2 VIEWS   Final Result      Mildly displaced subcapital left femoral neck fracture      DX-CHEST-PORTABLE (1 VIEW)   Final Result      1.  Cardiomegaly. No overt failure or pneumonia.      OUTSIDE IMAGES-DX CHEST   Final Result      OUTSIDE IMAGES-DX UPPER EXTREMITY, LEFT   Final Result      OUTSIDE IMAGES-DX PELVIS   Final Result      OUTSIDE IMAGES-DX UPPER EXTREMITY, LEFT   Final Result          X-Ray:  I have personally reviewed the images and compared with prior images. No acute intrathoracic abnormalities, no consolidations per my reading.     Assessment/Plan:  Justification for Admission Status  I anticipate this patient will require at least two midnights for appropriate medical management, necessitating inpatient admission because fall, L femur neck fracture requiring orthopedic consultation and surgery, PT/OT    Patient will need a Med/Surg bed on ORTHOPEDICS service .  The need is secondary to left femoral neck fracture.    * Left displaced femoral neck fracture (HCC)  Assessment & Plan  I have reviewed the left femur x-ray, Mild displaced subcapital left femoral neck fracture   Orthopedics was consulted. NPO   Multimodal pain managements including po and iv narcotics prn. Monitoring respiratory status and sedation score  PT/OT post surgery  Fall precuations      Advance care planning  Assessment & Plan  Full Code: I discussed code status with patient . She has medical  decision capacity. She wishes to have all life sustaining efforts if needed.   I discussed advance care planning with the patient for 16 minutes, including diagnosis, prognosis, plan of care, risks and benefits of any therapies that could be offered, as well as alternatives including palliation and hospice, as appropriate.        Leukocytosis  Assessment & Plan  Mild, likely reactive.   Cont monitoring     Fall at home, initial encounter- (present on admission)  Assessment & Plan  Frequent falls  PT/OT  Fall precautions      VTE prophylaxis: SCDs/TEDs    I independently reviewed pertinent clinical lab tests since admission and ordered additional follow up clinical lab tests.  I independently reviewed pertinent radiographic images and the radiologist's reports since admission and ordered additional follow up radiographic studies.  The details of the available patient records in [facility] (including laboratory tests, culture data, medications, imaging, and other pertinent diagnostic tests) and that information was utilized as warranted in today's medical decision making for this patient.  I personally evaluated the patient condition at bedside.     Care interventions include:   Review of interval medical and surgical history, current medications and outpatient medication reconciliation, interval imaging studies and radiologist interpretation, and interval laboratory values.     Aggregated care time spent evaluating, reassessing, reviewing documentation, providing care, and managing this patient exclusive of procedures: 76 minutes

## 2023-06-08 NOTE — ED NOTES
Pt found once again to be trying to get out of bed. This RN and assist tech helped pt back into bed. Encouraged pt to use purewick which happened successfully. No other needs.

## 2023-06-08 NOTE — ED NOTES
Pt asking for pain meds and was advised that her nurse would be in shortly to give her those meds. Upon entering the room the pt had removed her nasal cannula and was her oxygen was going down

## 2023-06-08 NOTE — ASSESSMENT & PLAN NOTE
I have reviewed the left femur x-ray, Mild displaced subcapital left femoral neck fracture   Orthopedics following, POD #2 ORIF/hemiarthroplasty   Ancef/vanc x 24 hours post op  WBAT, posterior hip precautions   Cont. lovenox for dvt ppx   PT /OT recommending post acute, IPR and SNF referral placed   Continue Multimodal pain managements including po and iv narcotics prn. Monitoring respiratory status and sedation score  Fall precuations

## 2023-06-08 NOTE — ED NOTES
Pt settled into PAMELA escobar at bedside now. Pt arrived on 2L NC as EMS states pt desaturates post pain medication administration. Was given   .5 of Dilaudid prior to transport. Pt not currently complaining of pain

## 2023-06-08 NOTE — ED PROVIDER NOTES
" ED PHYSICIAN NOTE    CHIEF COMPLAINT  Chief Complaint   Patient presents with    Hip Pain     GLF last night with L hip pain. Pt brought to incline last night and transferred here this AM        EXTERNAL RECORDS REVIEWED  Outpatient Notes records from transferring facility were reviewed.  Patient had a ground-level fall.  Labs demonstrated WBC count of 10.9 otherwise normal CBC.  CMP was unremarkable.    HPI/ROS  LIMITATION TO HISTORY   Dementia  OUTSIDE HISTORIAN(S):  I spoke with the transferring physician regarding this case.  Describes that patient has had more falls lately as well as some neurologic decline    Chava Bray is a 87 y.o. female who presents after a fall.  Patient fell on her left hip and left arm.  Was taken to the local hospital.  Had x-rays that were negative.  She denies hitting her head.  Denies neck pain back pain or other trauma.  No trauma to the thorax or abdomen.  Denies weakness numbness neurologic symptoms    PAST MEDICAL HISTORY  Past Medical History:   Diagnosis Date    Arthritis     Headache(784.0)     Headache, classical migraine     Hyperlipidemia     Osteoporosis, unspecified     Thyroid disease        SOCIAL HISTORY  Social History     Tobacco Use    Smoking status: Never    Smokeless tobacco: Never   Substance Use Topics    Alcohol use: Yes    Drug use: No       CURRENT MEDICATIONS  Home Medications       Reviewed by Serina Martinez R.N. (Registered Nurse) on 06/08/23 at 0850  Med List Status: Not Addressed     Medication Last Dose Status   acetaminophen (TYLENOL) 325 MG Tab  Active   guaiFENesin dextromethorphan (ROBITUSSIN DM) 100-10 MG/5ML Syrup syrup  Active                    ALLERGIES  Allergies   Allergen Reactions    Sulfa Drugs        PHYSICAL EXAM  VITAL SIGNS: Ht 1.626 m (5' 4\")   Wt 58.5 kg (129 lb)   BMI 22.14 kg/m²    Constitutional: Awake and alert  HENT: Normal inspection  Eyes: Normal inspection  Neck: Grossly normal range of motion.  Cardiovascular: " Normal heart rate, Normal rhythm.  Symmetric peripheral pulses.   Thorax & Lungs: No respiratory distress, No wheezing, No rales, No rhonchi, No chest tenderness.   Abdomen: Bowel sounds normal, soft, non-distended, nontender, no mass  Skin: No obvious rash.  Extremities: Contusion left upper arm.  Tenderness left hip with pain with range of motion.  Neurologic: Grossly normal   Psychiatric: Normal for situation     DIAGNOSTIC STUDIES / PROCEDURES  LABS/EKG  Results for orders placed or performed during the hospital encounter of 06/08/23   CBC WITH DIFFERENTIAL   Result Value Ref Range    WBC 10.9 (H) 4.8 - 10.8 K/uL    RBC 4.99 4.20 - 5.40 M/uL    Hemoglobin 15.0 12.0 - 16.0 g/dL    Hematocrit 46.8 37.0 - 47.0 %    MCV 93.8 81.4 - 97.8 fL    MCH 30.1 27.0 - 33.0 pg    MCHC 32.1 (L) 32.2 - 35.5 g/dL    RDW 47.8 35.9 - 50.0 fL    Platelet Count 190 164 - 446 K/uL    MPV 11.2 9.0 - 12.9 fL    Neutrophils-Polys 72.30 (H) 44.00 - 72.00 %    Lymphocytes 16.00 (L) 22.00 - 41.00 %    Monocytes 8.00 0.00 - 13.40 %    Eosinophils 2.90 0.00 - 6.90 %    Basophils 0.50 0.00 - 1.80 %    Immature Granulocytes 0.30 0.00 - 0.90 %    Nucleated RBC 0.00 0.00 - 0.20 /100 WBC    Neutrophils (Absolute) 7.91 (H) 1.82 - 7.42 K/uL    Lymphs (Absolute) 1.75 1.00 - 4.80 K/uL    Monos (Absolute) 0.87 (H) 0.00 - 0.85 K/uL    Eos (Absolute) 0.32 0.00 - 0.51 K/uL    Baso (Absolute) 0.06 0.00 - 0.12 K/uL    Immature Granulocytes (abs) 0.03 0.00 - 0.11 K/uL    NRBC (Absolute) 0.00 K/uL   COMP METABOLIC PANEL   Result Value Ref Range    Sodium 141 135 - 145 mmol/L    Potassium 3.8 3.6 - 5.5 mmol/L    Chloride 104 96 - 112 mmol/L    Co2 25 20 - 33 mmol/L    Anion Gap 12.0 7.0 - 16.0    Glucose 107 (H) 65 - 99 mg/dL    Bun 20 8 - 22 mg/dL    Creatinine 0.70 0.50 - 1.40 mg/dL    Calcium 9.3 8.5 - 10.5 mg/dL    AST(SGOT) 15 12 - 45 U/L    ALT(SGPT) 12 2 - 50 U/L    Alkaline Phosphatase 107 (H) 30 - 99 U/L    Total Bilirubin 1.3 0.1 - 1.5 mg/dL     Albumin 3.7 3.2 - 4.9 g/dL    Total Protein 7.1 6.0 - 8.2 g/dL    Globulin 3.4 1.9 - 3.5 g/dL    A-G Ratio 1.1 g/dL   APTT   Result Value Ref Range    APTT 31.8 24.7 - 36.0 sec   PROTHROMBIN TIME (INR)   Result Value Ref Range    PT 14.1 12.0 - 14.6 sec    INR 1.11 0.87 - 1.13   CORRECTED CALCIUM   Result Value Ref Range    Correct Calcium 9.5 8.5 - 10.5 mg/dL   ESTIMATED GFR   Result Value Ref Range    GFR (CKD-EPI) 84 >60 mL/min/1.73 m 2   EKG (NOW)   Result Value Ref Range    Report       [facility]    Test Date:  2023  Pt Name:    RACHANA LINTON           Department: ER  MRN:        [id number]                      Room:       BL 15  Gender:     Female                       Technician: [id number]  :        [id number]                   Requested By:LOUIS SCHULZ  Order #:    [id number]                    Reading MD:    Measurements  Intervals                                Axis  Rate:       74                           P:          58  NH:         144                          QRS:        44  QRSD:       103                          T:          187  QT:         387  QTc:        430    Interpretive Statements  Sinus rhythm  Paired ventricular premature complexes  Left atrial enlargement  LVH with secondary repolarization abnormality  Compared to ECG 2023 12:15:15  Left ventricular hypertrophy now present  Sinus tachycardia no longer present  Prolonged QT interval no longer present        I have independently interpreted this EKG  Rhythm strip interpretation-sinus rhythm    RADIOLOGY  I have independently interpreted the diagnostic imaging associated with this visit and am waiting the final reading from the radiologist.   My preliminary interpretation is as follows: Review of x-ray of the hip shows left subcapital femoral neck fracture  Radiologist interpretation:   DX-FEMUR-2+ LEFT   Final Result      Mild displaced subcapital left femoral neck fracture      DX-PELVIS-1  OR 2 VIEWS   Final Result      Mildly displaced subcapital left femoral neck fracture      DX-CHEST-PORTABLE (1 VIEW)   Final Result      1.  Cardiomegaly. No overt failure or pneumonia.      OUTSIDE IMAGES-DX CHEST   Final Result      OUTSIDE IMAGES-DX UPPER EXTREMITY, LEFT   Final Result      OUTSIDE IMAGES-DX PELVIS   Final Result      OUTSIDE IMAGES-DX UPPER EXTREMITY, LEFT   Final Result            COURSE & MEDICAL DECISION MAKING      INITIAL ASSESSMENT, COURSE AND PLAN  Care Narrative: Patient presents after a ground-level fall.  Denies syncope head injury or trauma aside from pain in the left hip and left arm.  Left arm x-ray was negative.  Left hip x-ray shows femoral neck fracture.  I obtained preoperative data including EKG and labs.    Data returned as noted above.    Messaged with Dr. Becker.  Gave patient information.  Message that he would see the patient.    Paged hospitalist.          DISPOSITION AND DISCUSSIONS  I have discussed management of the patient with the following physicians and GIRISH's: Dr. Anne, hospitalist        FINAL IMPRESSION  1.  Left femoral neck fracture    This dictation was created using voice recognition software. The accuracy of the dictation is limited to the abilities of the software. I expect there may be some errors of grammar and possibly content. The nursing notes were reviewed and certain aspects of this information were incorporated into this note.    Electronically signed by: Duke Mendoza M.D., 6/8/2023

## 2023-06-08 NOTE — ED NOTES
Pt states she need to pee. External purewick applied. Pt continues to ask for water. This RN reminded her for the third time she's not allowed to drink at this time. Pt given another mouth swab. Pt waiting to discuss plan of care with MD.

## 2023-06-08 NOTE — ED NOTES
Pt medicated per MAR. Sitting up in bed.     This RN spoke with pt's boyfriend 'Shawn' once more for update. Pt resting in [facility] waiting for update on plan from MD's.

## 2023-06-09 ENCOUNTER — APPOINTMENT (OUTPATIENT)
Dept: RADIOLOGY | Facility: MEDICAL CENTER | Age: 88
DRG: 522 | End: 2023-06-09
Attending: ORTHOPAEDIC SURGERY
Payer: MEDICARE

## 2023-06-09 ENCOUNTER — ANESTHESIA (OUTPATIENT)
Dept: SURGERY | Facility: MEDICAL CENTER | Age: 88
DRG: 522 | End: 2023-06-09
Payer: MEDICARE

## 2023-06-09 ENCOUNTER — ANESTHESIA EVENT (OUTPATIENT)
Dept: SURGERY | Facility: MEDICAL CENTER | Age: 88
DRG: 522 | End: 2023-06-09
Payer: MEDICARE

## 2023-06-09 LAB
ANION GAP SERPL CALC-SCNC: 13 MMOL/L (ref 7–16)
BUN SERPL-MCNC: 23 MG/DL (ref 8–22)
CALCIUM SERPL-MCNC: 9 MG/DL (ref 8.5–10.5)
CHLORIDE SERPL-SCNC: 102 MMOL/L (ref 96–112)
CO2 SERPL-SCNC: 22 MMOL/L (ref 20–33)
CREAT SERPL-MCNC: 0.75 MG/DL (ref 0.5–1.4)
ERYTHROCYTE [DISTWIDTH] IN BLOOD BY AUTOMATED COUNT: 48.8 FL (ref 35.9–50)
GFR SERPLBLD CREATININE-BSD FMLA CKD-EPI: 77 ML/MIN/1.73 M 2
GLUCOSE SERPL-MCNC: 109 MG/DL (ref 65–99)
HCT VFR BLD AUTO: 43.6 % (ref 37–47)
HGB BLD-MCNC: 13.9 G/DL (ref 12–16)
MCH RBC QN AUTO: 30 PG (ref 27–33)
MCHC RBC AUTO-ENTMCNC: 31.9 G/DL (ref 32.2–35.5)
MCV RBC AUTO: 94.2 FL (ref 81.4–97.8)
PLATELET # BLD AUTO: 175 K/UL (ref 164–446)
PMV BLD AUTO: 11.1 FL (ref 9–12.9)
POTASSIUM SERPL-SCNC: 3.7 MMOL/L (ref 3.6–5.5)
RBC # BLD AUTO: 4.63 M/UL (ref 4.2–5.4)
SODIUM SERPL-SCNC: 137 MMOL/L (ref 135–145)
WBC # BLD AUTO: 9.6 K/UL (ref 4.8–10.8)

## 2023-06-09 PROCEDURE — 700111 HCHG RX REV CODE 636 W/ 250 OVERRIDE (IP): Performed by: STUDENT IN AN ORGANIZED HEALTH CARE EDUCATION/TRAINING PROGRAM

## 2023-06-09 PROCEDURE — A9270 NON-COVERED ITEM OR SERVICE: HCPCS | Performed by: STUDENT IN AN ORGANIZED HEALTH CARE EDUCATION/TRAINING PROGRAM

## 2023-06-09 PROCEDURE — 01210 ANES OPEN PX HIP JOINT NOS: CPT | Performed by: STUDENT IN AN ORGANIZED HEALTH CARE EDUCATION/TRAINING PROGRAM

## 2023-06-09 PROCEDURE — 700101 HCHG RX REV CODE 250: Performed by: STUDENT IN AN ORGANIZED HEALTH CARE EDUCATION/TRAINING PROGRAM

## 2023-06-09 PROCEDURE — 502000 HCHG MISC OR IMPLANTS RC 0278: Performed by: ORTHOPAEDIC SURGERY

## 2023-06-09 PROCEDURE — 160048 HCHG OR STATISTICAL LEVEL 1-5: Performed by: ORTHOPAEDIC SURGERY

## 2023-06-09 PROCEDURE — 160036 HCHG PACU - EA ADDL 30 MINS PHASE I: Performed by: ORTHOPAEDIC SURGERY

## 2023-06-09 PROCEDURE — 160042 HCHG SURGERY MINUTES - EA ADDL 1 MIN LEVEL 5: Performed by: ORTHOPAEDIC SURGERY

## 2023-06-09 PROCEDURE — 700111 HCHG RX REV CODE 636 W/ 250 OVERRIDE (IP): Performed by: ORTHOPAEDIC SURGERY

## 2023-06-09 PROCEDURE — 99233 SBSQ HOSP IP/OBS HIGH 50: CPT | Performed by: STUDENT IN AN ORGANIZED HEALTH CARE EDUCATION/TRAINING PROGRAM

## 2023-06-09 PROCEDURE — 99100 ANES PT EXTEME AGE<1 YR&>70: CPT | Performed by: STUDENT IN AN ORGANIZED HEALTH CARE EDUCATION/TRAINING PROGRAM

## 2023-06-09 PROCEDURE — 700105 HCHG RX REV CODE 258: Performed by: ORTHOPAEDIC SURGERY

## 2023-06-09 PROCEDURE — 0SRS0J9 REPLACEMENT OF LEFT HIP JOINT, FEMORAL SURFACE WITH SYNTHETIC SUBSTITUTE, CEMENTED, OPEN APPROACH: ICD-10-PCS | Performed by: ORTHOPAEDIC SURGERY

## 2023-06-09 PROCEDURE — 700102 HCHG RX REV CODE 250 W/ 637 OVERRIDE(OP): Performed by: STUDENT IN AN ORGANIZED HEALTH CARE EDUCATION/TRAINING PROGRAM

## 2023-06-09 PROCEDURE — 85027 COMPLETE CBC AUTOMATED: CPT

## 2023-06-09 PROCEDURE — 160035 HCHG PACU - 1ST 60 MINS PHASE I: Performed by: ORTHOPAEDIC SURGERY

## 2023-06-09 PROCEDURE — 80048 BASIC METABOLIC PNL TOTAL CA: CPT

## 2023-06-09 PROCEDURE — C1776 JOINT DEVICE (IMPLANTABLE): HCPCS | Performed by: ORTHOPAEDIC SURGERY

## 2023-06-09 PROCEDURE — 36415 COLL VENOUS BLD VENIPUNCTURE: CPT

## 2023-06-09 PROCEDURE — 72170 X-RAY EXAM OF PELVIS: CPT

## 2023-06-09 PROCEDURE — C1713 ANCHOR/SCREW BN/BN,TIS/BN: HCPCS | Performed by: ORTHOPAEDIC SURGERY

## 2023-06-09 PROCEDURE — 160031 HCHG SURGERY MINUTES - 1ST 30 MINS LEVEL 5: Performed by: ORTHOPAEDIC SURGERY

## 2023-06-09 PROCEDURE — 160002 HCHG RECOVERY MINUTES (STAT): Performed by: ORTHOPAEDIC SURGERY

## 2023-06-09 PROCEDURE — 160009 HCHG ANES TIME/MIN: Performed by: ORTHOPAEDIC SURGERY

## 2023-06-09 PROCEDURE — 770006 HCHG ROOM/CARE - MED/SURG/GYN SEMI*

## 2023-06-09 PROCEDURE — 700105 HCHG RX REV CODE 258: Performed by: STUDENT IN AN ORGANIZED HEALTH CARE EDUCATION/TRAINING PROGRAM

## 2023-06-09 DEVICE — STEM HIP CEMENTED 132 DEG 30MM X 10MM SZ 5 OMINIFIT EON (1EA): Type: IMPLANTABLE DEVICE | Site: HIP | Status: FUNCTIONAL

## 2023-06-09 DEVICE — IMPLANTABLE DEVICE: Type: IMPLANTABLE DEVICE | Site: HIP | Status: FUNCTIONAL

## 2023-06-09 DEVICE — BONE CEMENT SIMPLEX FULL DOSE - (10EA/PK): Type: IMPLANTABLE DEVICE | Site: HIP | Status: FUNCTIONAL

## 2023-06-09 DEVICE — SPACER CEMENTED 10MM UNIVERSAL DISTAL (1EA): Type: IMPLANTABLE DEVICE | Site: HIP | Status: FUNCTIONAL

## 2023-06-09 RX ORDER — VANCOMYCIN HYDROCHLORIDE 1 G/20ML
INJECTION, POWDER, LYOPHILIZED, FOR SOLUTION INTRAVENOUS
Status: COMPLETED | OUTPATIENT
Start: 2023-06-09 | End: 2023-06-09

## 2023-06-09 RX ORDER — SODIUM CHLORIDE, SODIUM LACTATE, POTASSIUM CHLORIDE, CALCIUM CHLORIDE 600; 310; 30; 20 MG/100ML; MG/100ML; MG/100ML; MG/100ML
INJECTION, SOLUTION INTRAVENOUS CONTINUOUS
Status: DISCONTINUED | OUTPATIENT
Start: 2023-06-09 | End: 2023-06-09 | Stop reason: HOSPADM

## 2023-06-09 RX ORDER — ACETAMINOPHEN 500 MG
1000 TABLET ORAL ONCE
Status: COMPLETED | OUTPATIENT
Start: 2023-06-09 | End: 2023-06-09

## 2023-06-09 RX ORDER — HYDROMORPHONE HYDROCHLORIDE 1 MG/ML
0.2 INJECTION, SOLUTION INTRAMUSCULAR; INTRAVENOUS; SUBCUTANEOUS
Status: DISCONTINUED | OUTPATIENT
Start: 2023-06-09 | End: 2023-06-09 | Stop reason: HOSPADM

## 2023-06-09 RX ORDER — HYDRALAZINE HYDROCHLORIDE 20 MG/ML
5 INJECTION INTRAMUSCULAR; INTRAVENOUS
Status: DISCONTINUED | OUTPATIENT
Start: 2023-06-09 | End: 2023-06-09 | Stop reason: HOSPADM

## 2023-06-09 RX ORDER — CELECOXIB 200 MG/1
200 CAPSULE ORAL ONCE
Status: COMPLETED | OUTPATIENT
Start: 2023-06-09 | End: 2023-06-09

## 2023-06-09 RX ORDER — OXYCODONE HCL 5 MG/5 ML
10 SOLUTION, ORAL ORAL
Status: DISCONTINUED | OUTPATIENT
Start: 2023-06-09 | End: 2023-06-09 | Stop reason: HOSPADM

## 2023-06-09 RX ORDER — EPHEDRINE SULFATE 50 MG/ML
INJECTION, SOLUTION INTRAVENOUS PRN
Status: DISCONTINUED | OUTPATIENT
Start: 2023-06-09 | End: 2023-06-09 | Stop reason: SURG

## 2023-06-09 RX ORDER — SODIUM CHLORIDE, SODIUM LACTATE, POTASSIUM CHLORIDE, CALCIUM CHLORIDE 600; 310; 30; 20 MG/100ML; MG/100ML; MG/100ML; MG/100ML
INJECTION, SOLUTION INTRAVENOUS
Status: DISCONTINUED | OUTPATIENT
Start: 2023-06-09 | End: 2023-06-09 | Stop reason: SURG

## 2023-06-09 RX ORDER — ROCURONIUM BROMIDE 10 MG/ML
INJECTION, SOLUTION INTRAVENOUS PRN
Status: DISCONTINUED | OUTPATIENT
Start: 2023-06-09 | End: 2023-06-09 | Stop reason: SURG

## 2023-06-09 RX ORDER — ONDANSETRON 2 MG/ML
4 INJECTION INTRAMUSCULAR; INTRAVENOUS
Status: DISCONTINUED | OUTPATIENT
Start: 2023-06-09 | End: 2023-06-09 | Stop reason: HOSPADM

## 2023-06-09 RX ORDER — EPHEDRINE SULFATE 50 MG/ML
5 INJECTION, SOLUTION INTRAVENOUS
Status: DISCONTINUED | OUTPATIENT
Start: 2023-06-09 | End: 2023-06-09 | Stop reason: HOSPADM

## 2023-06-09 RX ORDER — HYDROMORPHONE HYDROCHLORIDE 1 MG/ML
0.1 INJECTION, SOLUTION INTRAMUSCULAR; INTRAVENOUS; SUBCUTANEOUS
Status: DISCONTINUED | OUTPATIENT
Start: 2023-06-09 | End: 2023-06-09 | Stop reason: HOSPADM

## 2023-06-09 RX ORDER — CEFAZOLIN SODIUM 1 G/3ML
INJECTION, POWDER, FOR SOLUTION INTRAMUSCULAR; INTRAVENOUS PRN
Status: DISCONTINUED | OUTPATIENT
Start: 2023-06-09 | End: 2023-06-09 | Stop reason: SURG

## 2023-06-09 RX ORDER — HYDROMORPHONE HYDROCHLORIDE 1 MG/ML
0.4 INJECTION, SOLUTION INTRAMUSCULAR; INTRAVENOUS; SUBCUTANEOUS
Status: DISCONTINUED | OUTPATIENT
Start: 2023-06-09 | End: 2023-06-09 | Stop reason: HOSPADM

## 2023-06-09 RX ORDER — LIDOCAINE HYDROCHLORIDE 20 MG/ML
INJECTION, SOLUTION EPIDURAL; INFILTRATION; INTRACAUDAL; PERINEURAL PRN
Status: DISCONTINUED | OUTPATIENT
Start: 2023-06-09 | End: 2023-06-09 | Stop reason: SURG

## 2023-06-09 RX ORDER — HALOPERIDOL 5 MG/ML
1 INJECTION INTRAMUSCULAR
Status: DISCONTINUED | OUTPATIENT
Start: 2023-06-09 | End: 2023-06-09 | Stop reason: HOSPADM

## 2023-06-09 RX ORDER — DIPHENHYDRAMINE HYDROCHLORIDE 50 MG/ML
12.5 INJECTION INTRAMUSCULAR; INTRAVENOUS
Status: DISCONTINUED | OUTPATIENT
Start: 2023-06-09 | End: 2023-06-09 | Stop reason: HOSPADM

## 2023-06-09 RX ORDER — DEXAMETHASONE SODIUM PHOSPHATE 4 MG/ML
INJECTION, SOLUTION INTRA-ARTICULAR; INTRALESIONAL; INTRAMUSCULAR; INTRAVENOUS; SOFT TISSUE PRN
Status: DISCONTINUED | OUTPATIENT
Start: 2023-06-09 | End: 2023-06-09 | Stop reason: SURG

## 2023-06-09 RX ORDER — OXYCODONE HCL 5 MG/5 ML
5 SOLUTION, ORAL ORAL
Status: DISCONTINUED | OUTPATIENT
Start: 2023-06-09 | End: 2023-06-09 | Stop reason: HOSPADM

## 2023-06-09 RX ADMIN — EPHEDRINE SULFATE 15 MG: 50 INJECTION, SOLUTION INTRAVENOUS at 10:08

## 2023-06-09 RX ADMIN — OXYCODONE 5 MG: 5 TABLET ORAL at 03:43

## 2023-06-09 RX ADMIN — FENTANYL CITRATE 75 MCG: 50 INJECTION, SOLUTION INTRAMUSCULAR; INTRAVENOUS at 09:56

## 2023-06-09 RX ADMIN — SUGAMMADEX 200 MG: 100 INJECTION, SOLUTION INTRAVENOUS at 10:55

## 2023-06-09 RX ADMIN — OXYCODONE 5 MG: 5 TABLET ORAL at 06:44

## 2023-06-09 RX ADMIN — EPHEDRINE SULFATE 15 MG: 50 INJECTION, SOLUTION INTRAVENOUS at 10:15

## 2023-06-09 RX ADMIN — DEXAMETHASONE SODIUM PHOSPHATE 4 MG: 4 INJECTION INTRA-ARTICULAR; INTRALESIONAL; INTRAMUSCULAR; INTRAVENOUS; SOFT TISSUE at 10:55

## 2023-06-09 RX ADMIN — PROPOFOL 80 MG: 10 INJECTION, EMULSION INTRAVENOUS at 09:56

## 2023-06-09 RX ADMIN — ROCURONIUM BROMIDE 40 MG: 50 INJECTION, SOLUTION INTRAVENOUS at 09:56

## 2023-06-09 RX ADMIN — SODIUM CHLORIDE, POTASSIUM CHLORIDE, SODIUM LACTATE AND CALCIUM CHLORIDE: 600; 310; 30; 20 INJECTION, SOLUTION INTRAVENOUS at 09:52

## 2023-06-09 RX ADMIN — FENTANYL CITRATE 50 MCG: 50 INJECTION, SOLUTION INTRAMUSCULAR; INTRAVENOUS at 10:36

## 2023-06-09 RX ADMIN — ACETAMINOPHEN 650 MG: 325 TABLET, FILM COATED ORAL at 16:52

## 2023-06-09 RX ADMIN — CEFAZOLIN 2 G: 1 INJECTION, POWDER, FOR SOLUTION INTRAMUSCULAR; INTRAVENOUS at 10:00

## 2023-06-09 RX ADMIN — CELECOXIB 200 MG: 200 CAPSULE ORAL at 09:08

## 2023-06-09 RX ADMIN — CEFAZOLIN 2 G: 2 INJECTION, POWDER, FOR SOLUTION INTRAMUSCULAR; INTRAVENOUS at 20:59

## 2023-06-09 RX ADMIN — OXYCODONE 5 MG: 5 TABLET ORAL at 16:56

## 2023-06-09 RX ADMIN — ACETAMINOPHEN 1000 MG: 500 TABLET, FILM COATED ORAL at 09:08

## 2023-06-09 RX ADMIN — FENTANYL CITRATE 75 MCG: 50 INJECTION, SOLUTION INTRAMUSCULAR; INTRAVENOUS at 10:21

## 2023-06-09 RX ADMIN — EPHEDRINE SULFATE 15 MG: 50 INJECTION, SOLUTION INTRAVENOUS at 10:47

## 2023-06-09 RX ADMIN — LIDOCAINE HYDROCHLORIDE 30 MG: 20 INJECTION, SOLUTION EPIDURAL; INFILTRATION; INTRACAUDAL at 09:56

## 2023-06-09 RX ADMIN — DEXAMETHASONE SODIUM PHOSPHATE 4 MG: 4 INJECTION INTRA-ARTICULAR; INTRALESIONAL; INTRAMUSCULAR; INTRAVENOUS; SOFT TISSUE at 10:00

## 2023-06-09 RX ADMIN — SENNOSIDES AND DOCUSATE SODIUM 2 TABLET: 50; 8.6 TABLET ORAL at 05:28

## 2023-06-09 RX ADMIN — EPHEDRINE SULFATE 20 MG: 50 INJECTION, SOLUTION INTRAVENOUS at 10:12

## 2023-06-09 ASSESSMENT — PAIN SCALES - GENERAL: PAIN_LEVEL: 6

## 2023-06-09 ASSESSMENT — ENCOUNTER SYMPTOMS
MEMORY LOSS: 0
FOCAL WEAKNESS: 0
DOUBLE VISION: 0
SHORTNESS OF BREATH: 0
ABDOMINAL PAIN: 0
NERVOUS/ANXIOUS: 0
SENSORY CHANGE: 0
VOMITING: 0
BLURRED VISION: 0
MYALGIAS: 1
SORE THROAT: 0
NAUSEA: 0
SPEECH CHANGE: 0
DIZZINESS: 0

## 2023-06-09 ASSESSMENT — PAIN DESCRIPTION - PAIN TYPE: TYPE: ACUTE PAIN

## 2023-06-09 NOTE — OR NURSING
Patient recovered well in post-op. AAOx2, oriented to self and place. VSS, on 2L via NC. Surgical sites GUALBERTO, surgical dressing in place CDI. XR completed. Declines pain. Patient tolerating fluids without nausea. Significant other updated and discussed POC. No belongings in pacu. Report called to GEOVANNY Barillas. Awaiting transport. O2 tank 1/2 full for transport.

## 2023-06-09 NOTE — THERAPY
Physical Therapy Contact Note    Patient Name: Chava Bray  Age:  87 y.o., Sex:  female  Medical Record #: [id number]  Today's Date: 6/9/2023    PT orders received. Pt pending surgical intervention for femur fx. Will complete PT eval post-op as appropriate.    Kang Smiley PT, DPT

## 2023-06-09 NOTE — ANESTHESIA PROCEDURE NOTES
Airway    Date/Time: 6/9/2023 9:57 AM    Performed by: Israel Juarez D.O.  Authorized by: Israel Juarez D.O.    Location:  OR  Urgency:  Elective  Difficult Airway: No    Indications for Airway Management:  Anesthesia      Spontaneous Ventilation: absent    Sedation Level:  Deep  Preoxygenated: Yes    Patient Position:  Sniffing  Mask Difficulty Assessment:  2 - vent by mask + OA or adjuvant +/- NMBA  Final Airway Type:  Endotracheal airway  Final Endotracheal Airway:  ETT  Cuffed: Yes    Technique Used for Successful ETT Placement:  Direct laryngoscopy    Insertion Site:  Oral  Blade Type:  Mitchell  Laryngoscope Blade/Videolaryngoscope Blade Size:  3  ETT Size (mm):  7.0  Measured from:  Teeth  ETT to Teeth (cm):  19  Placement Verified by: auscultation and capnometry    Cormack-Lehane Classification:  Grade IIa - partial view of glottis  Number of Attempts at Approach:  1

## 2023-06-09 NOTE — ANESTHESIA PREPROCEDURE EVALUATION
Case: [id number] Date/Time: 06/09/23 0945    Procedure: HEMIARTHROPLASTY, HIP (Left)    Location: [place] [address] / SURGERY [facility]    Surgeons: Sathish Becker M.D.          Relevant Problems   Other   (positive) Sacroiliitis (HCC)       Physical Exam    Airway   Mallampati: II  TM distance: >3 FB  Neck ROM: full       Cardiovascular - normal exam  Rhythm: regular  Rate: normal  (-) murmur     Dental - normal exam           Pulmonary - normal exam  Breath sounds clear to auscultation     Abdominal    Neurological - normal exam                 Anesthesia Plan    ASA 2       Plan - general       Airway plan will be ETT    (Patient not oriented but has no family or legal guardian. Case medically necessary. )      Induction: intravenous    Postoperative Plan: Postoperative administration of opioids is intended.    Pertinent diagnostic labs and testing reviewed    Informed Consent:  Emergent - Consent given by clinician

## 2023-06-09 NOTE — ANESTHESIA TIME REPORT
Anesthesia Start and Stop Event Times     Date Time Event    6/9/2023 0815 Ready for Procedure     0952 Anesthesia Start     1109 Anesthesia Stop        Responsible Staff  06/09/23    Name Role Begin End    Israel Juarez D.O. Anesth 0952 1109        Overtime Reason:  no overtime (within assigned shift)    Comments:

## 2023-06-09 NOTE — ANESTHESIA POSTPROCEDURE EVALUATION
Patient: Chava Bray    Procedure Summary     Date: 06/09/23 Room / Location: [place] [address] / SURGERY [facility]    Anesthesia Start: 0952 Anesthesia Stop: 1109    Procedure: HEMIARTHROPLASTY, HIP (Left: Hip) Diagnosis: (Left femoral neck fracture)    Surgeons: Sathish Becker M.D. Responsible Provider: Israel Juarez D.O.    Anesthesia Type: general ASA Status: 2          Final Anesthesia Type: general  Last vitals  BP   Blood Pressure : (!) 88/58    Temp   36.5 °C (97.7 °F)    Pulse   92   Resp   13    SpO2   98 %      Anesthesia Post Evaluation    Patient location during evaluation: PACU  Patient participation: complete - patient participated  Level of consciousness: awake and alert  Pain score: 6    Airway patency: patent  Anesthetic complications: no  Cardiovascular status: hemodynamically stable  Respiratory status: acceptable  Hydration status: euvolemic    PONV: none          No notable events documented.     Nurse Pain Score: 8 (NPRS)

## 2023-06-09 NOTE — PROGRESS NOTES
Patient transferred from BL-15.  Patient is A&Ox2-oriented to self & situation.  Patient denies pain at this time. Updated on POC.   Report given to NOC RN.

## 2023-06-09 NOTE — OR NURSING
Called Shawn Nye, patient's significant other for 30+ years. No next of kin or other contact available. He would like to be called after surgery is completed. Understands he is unable to consent for patient but gave verbal consent for surgery and anesthesia.

## 2023-06-09 NOTE — CONSULTS
DATE OF SERVICE:  06/08/2023     ORTHOPEDIC CONSULTATION     REQUESTING PHYSICIAN:  Duke Mendoza MD, emergency department.     REASON FOR CONSULTATION:  Left femoral neck fracture.     CHIEF COMPLAINT:  Left hip pain.     HISTORY OF PRESENT ILLNESS:  The patient is an 87-year-old.  She had a ground   level fall last night.  She denies having pain in the hip prior to the fall.    I was consulted to provide treatment recommendations and x-rays confirmed   displaced femoral neck fracture.  She is being evaluated for admission to the   hospitalist service.     PAST MEDICAL HISTORY:  ALLERGIES:  SULFA.     PAST MEDICAL DIAGNOSES:  Arthritis, headache, hyperlipidemia, osteoporosis,   thyroid disease.     PAST SURGICAL HISTORY:  None listed.  She denies previous surgical history.     SOCIAL HISTORY:  The patient is a nonsmoker.  She does drink some alcohol.    Denies illicit drug use.     OUTPATIENT MEDICATIONS:  Vitamin D, Tylenol, ibuprofen.     PHYSICAL EXAMINATION:  VITAL SIGNS:  Temperature is not recorded in Epic, heart rate 85, respiratory   rate 14, blood pressure 114/77, pulse oximetry 95% on 2 liters nasal cannula.  GENERAL APPEARANCE:  The patient is alert, pleasant, cooperative, in no acute   distress.  She does seem a little bit confused.  MUSCULOSKELETAL:  Left lower extremity is shortened and externally rotated.    She is able to dorsi and plantarflex the foot, and flex and extend toes.  She   is nontender to palpation at the knee.  There is no knee effusion present.    There is no evidence of obvious traumatic deformity of the right lower or   bilateral upper extremities, which are grossly neurovascularly intact.     DIAGNOSTIC IMAGING:  Plain x-rays of the pelvis, hip and the left femur show a   left displaced midcervical femoral neck fracture.  Plain x-rays of left   shoulder and humerus show no evidence of obvious acute bony abnormality.     ASSESSMENT:  An 87-year-old female with left displaced  femoral neck fracture.    She is being evaluated for admission to the hospitalist service.     RECOMMENDATIONS:  1.  I discussed these findings with the patient and I feel that she would   benefit from surgical management for her displaced femoral neck fracture.  We   discussed pros and cons of surgery and I feel she would be a good candidate   for hip hemiarthroplasty.  2.  She can have diet today and be n.p.o. after midnight in anticipation of   surgical management tomorrow and she should be bed rest in the meantime.  She   can have SCDs for DVT prophylaxis as well and heparin throughout the day if   otherwise felt to be clinically appropriate.  3.  She expressed understanding and wished to proceed with surgery when   possible.        ______________________________  MD ALEKSANDRA García/LING    DD:  06/08/2023 14:17  DT:  06/08/2023 17:23    Job#:  [id number]

## 2023-06-09 NOTE — PROGRESS NOTES
Patient likely has some underlying dementia but will proceed with surgery due to urgent need and medical necessity.  Her long term significant other does not have POA but agrees with the treatment plan.

## 2023-06-09 NOTE — ED NOTES
Mickie still assigned RN.   This RN called her to notify of no changes.   All belongings taken with pt upon transfer.

## 2023-06-09 NOTE — OR SURGEON
Immediate Post OP Note    PreOp Diagnosis: Left displaced femoral neck fracture      PostOp Diagnosis: same      Procedure(s):  HEMIARTHROPLASTY, HIP - Wound Class: Clean    Surgeon(s):  aSthish Becker M.D.    Anesthesiologist/Type of Anesthesia:  Anesthesiologist: Israel Juarez D.O./General    Surgical Staff:  Circulator: Lukas D. Gansert, R.N.  Scrub Person: Valery Acosta  First Assist: Agus Mathews P.A.-C.    Specimens removed if any:  * No specimens in log *    Estimated Blood Loss: 200cc    Findings: see dictation    Complications: none known    PLAN:  --readmit postop  --WBAT LLE  --posterior hip precautions  --ancef/vanc x 2 doses postop  --PT/OT for mobilization ASAP  --okay to start heparin or equivalent tomorrow am, continue SCDs  --fu 2 weeks postop        6/9/2023 11:05 AM Sathish Becker M.D.

## 2023-06-09 NOTE — PROGRESS NOTES
Hospital Medicine Daily Progress Note    Date of Service  6/9/2023    Chief Complaint  Chava Bray is a 87 y.o. female admitted 6/8/2023 with left hip fracture after GLF    Hospital Course  Chava Bray is a 87 y.o. female with a past medical history of hyperlipidemia, osteoporosis and thyroid disease who presented 6/8/2023 with GLF fall with L hip pain. Patient reports she was tripped and fell a couple days ago and resulting L hip pain and has been unable to ambulate. On evaluation, her CBC and BMP unremarkable except mild leukocytosis with wbc 10.9, left femur and pelvis xray notes Mild displaced subcapital left femoral neck fracture .  Case was discussed with orthopedics and she was admitted for operative repair.     Interval Problem Update  Pt seen and examined at bedside, she is awake and alert, eating lunch. She is post op  ORIF with hemiarthroplasty today, she denies any pain currently.   - vitals and labs reviewed, both are stable  - pt is a forgetful and a bit confused, delirium precuations, avoid oversedation, agressive mobilization and IS   - WBAT,posterior hip precautions   - ancef/vanc x 24 hours post   - DVT ppx tomorrow if H/H stable   - PT/OT for dispo needs     I have discussed this patient's plan of care and discharge plan at IDT rounds today with Case Management, Nursing, Nursing leadership, and other members of the IDT team.    Consultants/Specialty  orthopedics    Code Status  Full Code    Disposition  The patient is not medically cleared for discharge to home or a post-acute facility.  Anticipate discharge to: skilled nursing facility    I have placed the appropriate orders for post-discharge needs.    Review of Systems  Review of Systems   Constitutional:  Negative for malaise/fatigue.   HENT:  Negative for sore throat.    Eyes:  Negative for blurred vision and double vision.   Respiratory:  Negative for shortness of breath.    Cardiovascular:  Negative for chest pain and leg  swelling.   Gastrointestinal:  Negative for abdominal pain, nausea and vomiting.   Genitourinary:  Negative for dysuria and urgency.   Musculoskeletal:  Positive for joint pain and myalgias.   Neurological:  Negative for dizziness, sensory change, speech change and focal weakness.   Psychiatric/Behavioral:  Negative for memory loss. The patient is not nervous/anxious.         Physical Exam  Temp:  [36.4 °C (97.5 °F)-36.8 °C (98.3 °F)] 36.7 °C (98.1 °F)  Pulse:  [60-98] 84  Resp:  [12-16] 15  BP: ()/() 99/63  SpO2:  [91 %-98 %] 95 %    Physical Exam  Vitals reviewed.   Constitutional:       Appearance: She is not ill-appearing.      Comments: Thin, frail elderly female, does appear younger than stated age   HENT:      Head: Normocephalic and atraumatic.      Mouth/Throat:      Mouth: Mucous membranes are moist.   Eyes:      Extraocular Movements: Extraocular movements intact.      Conjunctiva/sclera: Conjunctivae normal.   Cardiovascular:      Rate and Rhythm: Normal rate and regular rhythm.      Heart sounds: Normal heart sounds.   Pulmonary:      Breath sounds: Normal breath sounds.   Abdominal:      General: Bowel sounds are normal.      Palpations: Abdomen is soft.   Musculoskeletal:         General: Swelling, tenderness and signs of injury present.      Cervical back: Neck supple.      Comments: Left post op hip dressing    Skin:     General: Skin is warm.   Neurological:      Mental Status: She is alert and oriented to person, place, and time.   Psychiatric:         Mood and Affect: Mood normal.         Behavior: Behavior normal.      Comments: Forgetful          Fluids    Intake/Output Summary (Last 24 hours) at 6/9/2023 1505  Last data filed at 6/9/2023 1108  Gross per 24 hour   Intake 500 ml   Output 450 ml   Net 50 ml       Laboratory  Recent Labs     06/08/23  0859 06/09/23  0123   WBC 10.9* 9.6   RBC 4.99 4.63   HEMOGLOBIN 15.0 13.9   HEMATOCRIT 46.8 43.6   MCV 93.8 94.2   MCH 30.1 30.0   MCHC  32.1* 31.9*   RDW 47.8 48.8   PLATELETCT 190 175   MPV 11.2 11.1     Recent Labs     06/08/23  0859 06/09/23  0123   SODIUM 141 137   POTASSIUM 3.8 3.7   CHLORIDE 104 102   CO2 25 22   GLUCOSE 107* 109*   BUN 20 23*   CREATININE 0.70 0.75   CALCIUM 9.3 9.0     Recent Labs     06/08/23  0859   APTT 31.8   INR 1.11               Imaging  DX-PELVIS-1 OR 2 VIEWS   Final Result      1.  Status post LEFT hip arthroplasty   2.  RIGHT hip osteoarthritis      DX-FEMUR-2+ LEFT   Final Result      Mild displaced subcapital left femoral neck fracture      DX-PELVIS-1 OR 2 VIEWS   Final Result      Mildly displaced subcapital left femoral neck fracture      DX-CHEST-PORTABLE (1 VIEW)   Final Result      1.  Cardiomegaly. No overt failure or pneumonia.      OUTSIDE IMAGES-DX CHEST   Final Result      OUTSIDE IMAGES-DX UPPER EXTREMITY, LEFT   Final Result      OUTSIDE IMAGES-DX PELVIS   Final Result      OUTSIDE IMAGES-DX UPPER EXTREMITY, LEFT   Final Result           Assessment/Plan  * Left displaced femoral neck fracture (HCC)  Assessment & Plan  I have reviewed the left femur x-ray, Mild displaced subcapital left femoral neck fracture   Orthopedics following, POD #0 ORIF/hemiarthroplasty   Ancef/vanc x 24 hours post op  WBAT, posterior hip precautions   Chemical DVT ppx tomorrow if stable   PT /OT for dispo   Continue Multimodal pain managements including po and iv narcotics prn. Monitoring respiratory status and sedation score  Fall precuations      Advance care planning  Assessment & Plan  Full Code: I discussed code status with patient . She has medical decision capacity. She wishes to have all life sustaining efforts if needed.   I discussed advance care planning with the patient for 16 minutes, including diagnosis, prognosis, plan of care, risks and benefits of any therapies that could be offered, as well as alternatives including palliation and hospice, as appropriate.        Leukocytosis  Assessment & Plan  Mild, likely  reactive.   Improved to normal   monitor for signs of infection     Fall at home, initial encounter- (present on admission)  Assessment & Plan  Frequent falls  PT/OT  Fall precautions         VTE prophylaxis: SCDs/TEDs    I have performed a physical exam and reviewed and updated ROS and Plan today (6/9/2023). In review of yesterday's note (6/8/2023), there are no changes except as documented above.      Greater than 51 minutes spent prepping to see patient (e.g. review of tests) obtaining and/or reviewing separately obtained history. Performing a medically appropriate examination and/ evaluation.  Counseling and educating the patient/family/caregiver.  Ordering medications, tests, or procedures.  Referring and communicating with other health care professionals.  Documenting clinical information in EPIC.  Independently interpreting results and communicating results to patient/family/caregiver.  Care coordination.

## 2023-06-09 NOTE — HOSPITAL COURSE
Chava Bray is a 87 y.o. female with a past medical history of hyperlipidemia, osteoporosis and thyroid disease who presented 6/8/2023 with GLF fall with L hip pain. Patient reports she was tripped and fell a couple days ago and resulting L hip pain and has been unable to ambulate. On evaluation, her CBC and BMP unremarkable except mild leukocytosis with wbc 10.9, left femur and pelvis xray notes Mild displaced subcapital left femoral neck fracture .  Case was discussed with orthopedics and she was admitted for operative repair.

## 2023-06-09 NOTE — OP REPORT
DATE OF SERVICE:  06/09/2023     PREOPERATIVE DIAGNOSIS:  Left displaced femoral neck fracture.     POSTOPERATIVE DIAGNOSIS:  Left displaced femoral neck fracture.     PROCEDURE PERFORMED:  Open treatment of left neck fracture with   hemiarthroplasty.     SURGEON:  Sathish Becker MD     ANESTHESIOLOGIST:  Israel Juarez DO     ANESTHESIA:  General.     ASSISTANT:  Agus Mathews PA-C     ESTIMATED BLOOD LOSS:  200 mL.     IMPLANTS:  Corinna COY cemented hip hemiarthroplasty with a size 5 standard   neck angle femoral stem and a +5 mm x 48 mm outer diameter femoral head.     INDICATIONS FOR PROCEDURE:  The patient is 87 years old.  She does have some   baseline underlying dementia.  She had a fall, sustained a left displaced   femoral neck fracture.  She was admitted to the hospitalist service.  I   evaluated her and recommended surgical management with hemiarthroplasty.  She   does not have any other power of  or first-degree family member, but   she does have a significant other that has been together with her for about 30   years and he was in agreement with having her proceed with surgical   management.  She also wished to proceed with surgical management, but was   clearly somewhat confused, but we plan to proceed due to medical necessity   given her injury.     DESCRIPTION OF PROCEDURE:  The patient was met in the preoperative holding   area.  Her surgical site was signed.  Her consent was confirmed to be   accurate.  She was taken back to the operating room and general anesthesia was   induced.  She was positioned into the right lateral decubitus position with   Stulberg's positioners and an axillary roll.  Left lower extremity was   provisionally cleansed with isopropyl alcohol and then prepped and draped in   the usual sterile fashion.  A formal timeout was performed to confirm   patient's correct name, correct surgical site, correct procedure and correct   laterality.  A posterior approach  to the hip was then performed with a scalpel   down through skin.  Dissection was carried with Bovie cautery through the   subcutaneous tissue down to the iliotibial band and the gluteal fascia, which   was split longitudinally in line with the muscle fibers of gluteus hola.  I   identified the plane between piriformis and gluteus minimus and released the   short external rotators and full-thickness sleeve off the proximal femur with   the posterior capsule.  I then resected excess femoral neck with an   oscillating saw and removed the femoral head from the acetabulum.  There was   some mild diffuse wear within the acetabulum consistent with osteoarthritis to   mild degree.  I then with appropriate retractors in place, started preparing   the femur with a box osteotome followed by canal finding reamer followed by   sequential reaming up to a size 6 reamer and then sequentially broached up to   a size 5 reamer, which had good medial lateral fill and rotational stability.    I trialled and felt that it would be an appropriate stem. Removed the trial   broach and thoroughly irrigated out the acetabulum as well as the femoral   canal, inserted a cement restrictor and dried the femoral canal and mixed   vancomycin-impregnated polymethylmethacrylate cement.  Once it was appropriate   consistency filled and pressurized the canal, I inserted the stem, removing   excess cement during the curing process.  Once cement was sufficiently cured,   I then trialed again and selected ultimately a +5 mm x 48 mm outer diameter   femoral head.  I removed the trial component, cleansed and dried the trunnion,   irrigated out the acetabulum once more of any particulate debris and then   inserted the head, gently impacted and reduced the hip.  The wound was then   soaked in dilute Betadine solution for 3 minutes. The short external rotators   and posterior capsule were repaired through the greater trochanter with #5   Ti-Cron.  I  repaired the IT band and gluteal fascia with size 2 Stratafix   suture, subcutaneous layers with 0 Vicryl, 2-0 Vicryl and skin edges with   staples.  Wounds were thoroughly cleansed, dried and sterile   silver-impregnated Mepilex dressing was applied.  She was then awoken from   anesthesia and transferred on the rErving and taken to postanesthesia care unit   in stable condition.     PLAN:  1.  The patient will be readmitted to the medical service postop.  2.  She can weightbear as tolerated to left lower extremity, but should   maintain posterior hip precautions at all times.  3.  She should work with physical and occupational therapy as soon as possible   for mobilization.  4.  She will need Ancef and vancomycin for 24 hours postop for infection   prophylaxis.  5.  She will be started on heparin or equivalent tomorrow morning if otherwise   clinically appropriate and should continue SCDs for DVT prophylaxis in the   meantime.  6.  Ultimately, she will need to follow up with me 2 weeks postop for routine   wound check, staple removal and x-rays, 2 views of left hip and AP pelvis.        ______________________________  MD ALEKSANDRA García/JUAN/ASHLIE    DD:  06/09/2023 11:10  DT:  06/09/2023 11:37    Job#:  [id number]

## 2023-06-09 NOTE — DOCUMENTATION QUERY
[facility]                                                                       Query Response Note      PATIENT:               RACHANA LINTON  ACCT #:                  [id number]  MRN:                     [id number]  :                      1935  ADMIT DATE:       2023 8:43 AM  DISCH DATE:          RESPONDING  PROVIDER #:        [id number]           QUERY TEXT:    When both osteoporosis and a trauma occur with a resulting fracture, coding clinic directs the Clinical  to query the physician for clarification of the type of fracture. Based on clinical findings, risk factors and treatment, can this fracture be further specified as:      The patient's Clinical Indicators include:  Findings:  --Patient admitted for left subcapital femoral neck fracture s/p GLF  --Per ER provider notes , H&P and orthopedic consult, patient has a past medical history of      osteoporosis  --Left femur xray from :  Mineralization is unremarkable for age. Mild displaced subcapital left femoral      neck fracture  --Pelvic xray :  Mildly displaced subcapital left femoral neck fracture. Moderate degenerative changes      of the bilateral hip joints    Treatment:  --Pain control  --Orthopedic consult  --Pending left hip hemiarthroplasty  --Fall precautions  --Pending PT/OT evals    Risk Factors:  --Left subcapital femoral neck fracture s/p GLF  --Past medical history of osteoporosis  --Frequent falls    Thank you,  Nadia JACK, RN  Clinical   Connect via [employer] messenger  Options provided:   -- Pathological fracture secondary to osteoporosis   -- Traumatic fracture   -- Other explanation, please specify   -- Unable to determine      Query created by: Nadia Kimble on 2023 5:27 AM    RESPONSE TEXT:    Unable to determine          Electronically signed by:  EDMUND VIERA MD 2023  8:31 AM

## 2023-06-10 PROBLEM — G31.84 MILD COGNITIVE IMPAIRMENT: Status: ACTIVE | Noted: 2023-02-22

## 2023-06-10 LAB
ANION GAP SERPL CALC-SCNC: 14 MMOL/L (ref 7–16)
BASOPHILS # BLD AUTO: 0.1 % (ref 0–1.8)
BASOPHILS # BLD: 0.01 K/UL (ref 0–0.12)
BUN SERPL-MCNC: 33 MG/DL (ref 8–22)
CALCIUM SERPL-MCNC: 9 MG/DL (ref 8.5–10.5)
CHLORIDE SERPL-SCNC: 99 MMOL/L (ref 96–112)
CO2 SERPL-SCNC: 25 MMOL/L (ref 20–33)
CREAT SERPL-MCNC: 0.98 MG/DL (ref 0.5–1.4)
EOSINOPHIL # BLD AUTO: 0.02 K/UL (ref 0–0.51)
EOSINOPHIL NFR BLD: 0.2 % (ref 0–6.9)
ERYTHROCYTE [DISTWIDTH] IN BLOOD BY AUTOMATED COUNT: 48.7 FL (ref 35.9–50)
GFR SERPLBLD CREATININE-BSD FMLA CKD-EPI: 56 ML/MIN/1.73 M 2
GLUCOSE SERPL-MCNC: 124 MG/DL (ref 65–99)
HCT VFR BLD AUTO: 41.3 % (ref 37–47)
HGB BLD-MCNC: 13.2 G/DL (ref 12–16)
IMM GRANULOCYTES # BLD AUTO: 0.03 K/UL (ref 0–0.11)
IMM GRANULOCYTES NFR BLD AUTO: 0.3 % (ref 0–0.9)
LYMPHOCYTES # BLD AUTO: 1.17 K/UL (ref 1–4.8)
LYMPHOCYTES NFR BLD: 11.5 % (ref 22–41)
MCH RBC QN AUTO: 30.3 PG (ref 27–33)
MCHC RBC AUTO-ENTMCNC: 32 G/DL (ref 32.2–35.5)
MCV RBC AUTO: 94.7 FL (ref 81.4–97.8)
MONOCYTES # BLD AUTO: 0.93 K/UL (ref 0–0.85)
MONOCYTES NFR BLD AUTO: 9.1 % (ref 0–13.4)
NEUTROPHILS # BLD AUTO: 8.03 K/UL (ref 1.82–7.42)
NEUTROPHILS NFR BLD: 78.8 % (ref 44–72)
NRBC # BLD AUTO: 0 K/UL
NRBC BLD-RTO: 0 /100 WBC (ref 0–0.2)
PLATELET # BLD AUTO: 177 K/UL (ref 164–446)
PMV BLD AUTO: 10.8 FL (ref 9–12.9)
POTASSIUM SERPL-SCNC: 3.8 MMOL/L (ref 3.6–5.5)
RBC # BLD AUTO: 4.36 M/UL (ref 4.2–5.4)
SODIUM SERPL-SCNC: 138 MMOL/L (ref 135–145)
WBC # BLD AUTO: 10.2 K/UL (ref 4.8–10.8)

## 2023-06-10 PROCEDURE — 80048 BASIC METABOLIC PNL TOTAL CA: CPT

## 2023-06-10 PROCEDURE — 700111 HCHG RX REV CODE 636 W/ 250 OVERRIDE (IP): Performed by: ORTHOPAEDIC SURGERY

## 2023-06-10 PROCEDURE — 36415 COLL VENOUS BLD VENIPUNCTURE: CPT

## 2023-06-10 PROCEDURE — 700102 HCHG RX REV CODE 250 W/ 637 OVERRIDE(OP): Performed by: STUDENT IN AN ORGANIZED HEALTH CARE EDUCATION/TRAINING PROGRAM

## 2023-06-10 PROCEDURE — 97163 PT EVAL HIGH COMPLEX 45 MIN: CPT

## 2023-06-10 PROCEDURE — 770001 HCHG ROOM/CARE - MED/SURG/GYN PRIV*

## 2023-06-10 PROCEDURE — 85025 COMPLETE CBC W/AUTO DIFF WBC: CPT

## 2023-06-10 PROCEDURE — 97167 OT EVAL HIGH COMPLEX 60 MIN: CPT

## 2023-06-10 PROCEDURE — A9270 NON-COVERED ITEM OR SERVICE: HCPCS | Performed by: STUDENT IN AN ORGANIZED HEALTH CARE EDUCATION/TRAINING PROGRAM

## 2023-06-10 PROCEDURE — 700105 HCHG RX REV CODE 258: Performed by: ORTHOPAEDIC SURGERY

## 2023-06-10 PROCEDURE — 99232 SBSQ HOSP IP/OBS MODERATE 35: CPT | Performed by: STUDENT IN AN ORGANIZED HEALTH CARE EDUCATION/TRAINING PROGRAM

## 2023-06-10 RX ORDER — ENOXAPARIN SODIUM 100 MG/ML
40 INJECTION SUBCUTANEOUS DAILY
Status: DISCONTINUED | OUTPATIENT
Start: 2023-06-10 | End: 2023-06-12 | Stop reason: HOSPADM

## 2023-06-10 RX ORDER — ACETAMINOPHEN 500 MG
1000 TABLET ORAL 3 TIMES DAILY
Status: DISCONTINUED | OUTPATIENT
Start: 2023-06-10 | End: 2023-06-12 | Stop reason: HOSPADM

## 2023-06-10 RX ORDER — ACETAMINOPHEN 325 MG/1
650 TABLET ORAL EVERY 6 HOURS PRN
Status: DISCONTINUED | OUTPATIENT
Start: 2023-06-13 | End: 2023-06-12 | Stop reason: HOSPADM

## 2023-06-10 RX ADMIN — ACETAMINOPHEN 1000 MG: 500 TABLET, FILM COATED ORAL at 20:54

## 2023-06-10 RX ADMIN — ACETAMINOPHEN 1000 MG: 500 TABLET, FILM COATED ORAL at 08:47

## 2023-06-10 RX ADMIN — CEFAZOLIN 2 G: 2 INJECTION, POWDER, FOR SOLUTION INTRAMUSCULAR; INTRAVENOUS at 02:39

## 2023-06-10 RX ADMIN — ACETAMINOPHEN 1000 MG: 500 TABLET, FILM COATED ORAL at 14:15

## 2023-06-10 RX ADMIN — OXYCODONE 5 MG: 5 TABLET ORAL at 20:55

## 2023-06-10 RX ADMIN — OXYCODONE 5 MG: 5 TABLET ORAL at 01:33

## 2023-06-10 ASSESSMENT — COGNITIVE AND FUNCTIONAL STATUS - GENERAL
WALKING IN HOSPITAL ROOM: A LITTLE
DAILY ACTIVITIY SCORE: 17
STANDING UP FROM CHAIR USING ARMS: A LITTLE
SUGGESTED CMS G CODE MODIFIER DAILY ACTIVITY: CK
SUGGESTED CMS G CODE MODIFIER MOBILITY: CL
DRESSING REGULAR UPPER BODY CLOTHING: A LITTLE
MOVING TO AND FROM BED TO CHAIR: UNABLE
DRESSING REGULAR LOWER BODY CLOTHING: TOTAL
HELP NEEDED FOR BATHING: A LOT
TOILETING: A LITTLE
MOBILITY SCORE: 11
TURNING FROM BACK TO SIDE WHILE IN FLAT BAD: UNABLE
MOVING FROM LYING ON BACK TO SITTING ON SIDE OF FLAT BED: UNABLE
CLIMB 3 TO 5 STEPS WITH RAILING: A LOT

## 2023-06-10 ASSESSMENT — PAIN SCALES - PAIN ASSESSMENT IN ADVANCED DEMENTIA (PAINAD)
BODYLANGUAGE: TENSE, DISTRESSED PACING, FIDGETING
CONSOLABILITY: NO NEED TO CONSOLE
BODYLANGUAGE: RELAXED
NEGVOCALIZATION: OCCASIONAL MOAN/GROAN, LOW SPEECH, NEGATIVE/DISAPPROVING QUALITY
TOTALSCORE: 5
BREATHING: OCCASIONAL LABORED BREATHING, SHORT PERIOD OF HYPERVENTILATION
CONSOLABILITY: DISTRACTED OR REASSURED BY VOICE/TOUCH
BREATHING: NORMAL
TOTALSCORE: 0
FACIALEXPRESSION: SAD, FRIGHTENED, FROWN
FACIALEXPRESSION: SMILING OR INEXPRESSIVE

## 2023-06-10 ASSESSMENT — ACTIVITIES OF DAILY LIVING (ADL): TOILETING: INDEPENDENT

## 2023-06-10 ASSESSMENT — PAIN DESCRIPTION - PAIN TYPE: TYPE: SURGICAL PAIN

## 2023-06-10 ASSESSMENT — ENCOUNTER SYMPTOMS
SHORTNESS OF BREATH: 0
VOMITING: 0
SENSORY CHANGE: 0
BLURRED VISION: 0
SPEECH CHANGE: 0
MYALGIAS: 1
DOUBLE VISION: 0
SORE THROAT: 0
ABDOMINAL PAIN: 0
FOCAL WEAKNESS: 0
NERVOUS/ANXIOUS: 0
MEMORY LOSS: 0
NAUSEA: 0
DIZZINESS: 0

## 2023-06-10 ASSESSMENT — GAIT ASSESSMENTS
ASSISTIVE DEVICE: FRONT WHEEL WALKER
GAIT LEVEL OF ASSIST: CONTACT GUARD ASSIST
DEVIATION: STEP TO;ANTALGIC
DISTANCE (FEET): 20

## 2023-06-10 NOTE — ASSESSMENT & PLAN NOTE
Pt appears to have cognitive impairment on exam, I suspect dementia, per SO, he has noted cognitive decline over the last 6-8 months, she has maintained her ADLS, he does iADLs   Forgetful, needs reorientation   Limit sedatives medications as able   Delirium precautions   Mobilization and IS

## 2023-06-10 NOTE — CARE PLAN
The patient is Stable - Low risk of patient condition declining or worsening    Shift Goals  Clinical Goals: patient safety will be ensured throughout this shift  Patient Goals: GUALBERTO confused    Progress made toward(s) clinical / shift goals:  patient given tylenol and self repositions when she is in pain or uncomfortable. Pt has memory problems and requires reorientation and fall education frequently.     Problem: Pain - Standard  Goal: Alleviation of pain or a reduction in pain to the patient’s comfort goal  Outcome: Progressing     Problem: Skin Integrity  Goal: Skin integrity is maintained or improved  Outcome: Progressing       Patient is not progressing towards the following goals:      Problem: Knowledge Deficit - Standard  Goal: Patient and family/care givers will demonstrate understanding of plan of care, disease process/condition, diagnostic tests and medications  Outcome: Not Progressing

## 2023-06-10 NOTE — PROGRESS NOTES
Bedside report received. Assumed care of patient this morning. Assessment completed      Patient is A&O x 2 disoriented to time and place, educated patient to call for assistance  Reports 3-4 /10 pain. Schedule medication administered  Pt is on 2L oxygen, baseline is RA.   Mobility 2x but patient refusing to move and to turn Bed alarm in use.  Voiding +  Flatus +      Plan of care reviewed with the patient. Bed is locked and in the lowest position. Call light is within reach. Patient encouraged to voice needs and concerns, all needs met at this time. Hourly rounding in place.

## 2023-06-10 NOTE — PROGRESS NOTES
"Hospital Medicine Daily Progress Note    Date of Service  6/10/2023    Chief Complaint  Chava Bray is a 87 y.o. female admitted 6/8/2023 with left hip fracture after GLF    Hospital Course  Chava Bray is a 87 y.o. female with a past medical history of hyperlipidemia, osteoporosis and thyroid disease who presented 6/8/2023 with GLF fall with L hip pain. Patient reports she was tripped and fell a couple days ago and resulting L hip pain and has been unable to ambulate. On evaluation, her CBC and BMP unremarkable except mild leukocytosis with wbc 10.9, left femur and pelvis xray notes Mild displaced subcapital left femoral neck fracture .  Case was discussed with orthopedics and she was admitted for operative repair.     Interval Problem Update  Pt seen and examined at bedside, she is awake and alert, stating she is having pain, appears comfortable. Oriented to self, place and that she has a broken hip but needs frequent reminding of plan of care moving forward. Discussed she needs therapy, she does feel like she can do this, although she is moving her hip in bed. Discussed she may need rehab, she states \"I will absolutely not go\". States she lives with roommate but them tells me to find her  because he will be mad that she broke her hip.   - delirium precuations, avoid oversedation, agressive mobilization and IS   - vitals and labs reviewed, both are stable, mild bump in BUN/Cr will need to monitor, repeat BMP in Am, encourage oral hydration     - WBAT,posterior hip precautions     - DVT ppx added, H/H stable   - PT/OT for dispo needs       Spoke with Shawn via phone, pt partner for 30 years, he has noted a cognitive decline over the last 6-8 months, however she has been able to maintain her ADLs, he takes care of iADLs. He is supportive of her going to rehab, he notes she had a very poor experience at SNF in past ([facility]) and would not like her to return their. Is hoping she could get acute " rehab.    I have discussed this patient's plan of care and discharge plan at IDT rounds today with Case Management, Nursing, Nursing leadership, and other members of the IDT team.    Consultants/Specialty  orthopedics    Code Status  Full Code    Disposition  The patient is not medically cleared for discharge to home or a post-acute facility.      I have placed the appropriate orders for post-discharge needs.    Review of Systems  Review of Systems   Constitutional:  Negative for malaise/fatigue.   HENT:  Negative for sore throat.    Eyes:  Negative for blurred vision and double vision.   Respiratory:  Negative for shortness of breath.    Cardiovascular:  Negative for chest pain and leg swelling.   Gastrointestinal:  Negative for abdominal pain, nausea and vomiting.   Genitourinary:  Negative for dysuria and urgency.   Musculoskeletal:  Positive for joint pain and myalgias.   Neurological:  Negative for dizziness, sensory change, speech change and focal weakness.   Psychiatric/Behavioral:  Negative for memory loss. The patient is not nervous/anxious.         Physical Exam  Temp:  [36.3 °C (97.3 °F)-36.7 °C (98.1 °F)] 36.7 °C (98.1 °F)  Pulse:  [48-98] 90  Resp:  [12-18] 18  BP: ()/(45-84) 131/64  SpO2:  [91 %-100 %] 100 %    Physical Exam  Vitals reviewed.   Constitutional:       Appearance: She is not ill-appearing.      Comments: Thin, frail elderly female, does appear younger than stated age   HENT:      Head: Normocephalic and atraumatic.      Mouth/Throat:      Mouth: Mucous membranes are moist.   Eyes:      Extraocular Movements: Extraocular movements intact.      Conjunctiva/sclera: Conjunctivae normal.   Cardiovascular:      Rate and Rhythm: Normal rate and regular rhythm.      Heart sounds: Normal heart sounds.   Pulmonary:      Breath sounds: Normal breath sounds.   Abdominal:      General: Bowel sounds are normal.      Palpations: Abdomen is soft.   Musculoskeletal:         General: Swelling,  tenderness and signs of injury present.      Cervical back: Neck supple.      Comments: Left post op hip dressing    Skin:     General: Skin is warm.   Neurological:      Mental Status: She is alert and oriented to person, place, and time.   Psychiatric:         Mood and Affect: Mood normal.         Behavior: Behavior normal.         Cognition and Memory: Memory is impaired. She exhibits impaired recent memory.      Comments: Forgetful          Fluids    Intake/Output Summary (Last 24 hours) at 6/10/2023 1041  Last data filed at 6/10/2023 0400  Gross per 24 hour   Intake 700 ml   Output 400 ml   Net 300 ml       Laboratory  Recent Labs     06/08/23  0859 06/09/23  0123 06/10/23  0409   WBC 10.9* 9.6 10.2   RBC 4.99 4.63 4.36   HEMOGLOBIN 15.0 13.9 13.2   HEMATOCRIT 46.8 43.6 41.3   MCV 93.8 94.2 94.7   MCH 30.1 30.0 30.3   MCHC 32.1* 31.9* 32.0*   RDW 47.8 48.8 48.7   PLATELETCT 190 175 177   MPV 11.2 11.1 10.8     Recent Labs     06/08/23  0859 06/09/23  0123 06/10/23  0409   SODIUM 141 137 138   POTASSIUM 3.8 3.7 3.8   CHLORIDE 104 102 99   CO2 25 22 25   GLUCOSE 107* 109* 124*   BUN 20 23* 33*   CREATININE 0.70 0.75 0.98   CALCIUM 9.3 9.0 9.0     Recent Labs     06/08/23  0859   APTT 31.8   INR 1.11               Imaging  DX-PELVIS-1 OR 2 VIEWS   Final Result      1.  Status post LEFT hip arthroplasty   2.  RIGHT hip osteoarthritis      DX-FEMUR-2+ LEFT   Final Result      Mild displaced subcapital left femoral neck fracture      DX-PELVIS-1 OR 2 VIEWS   Final Result      Mildly displaced subcapital left femoral neck fracture      DX-CHEST-PORTABLE (1 VIEW)   Final Result      1.  Cardiomegaly. No overt failure or pneumonia.      OUTSIDE IMAGES-DX CHEST   Final Result      OUTSIDE IMAGES-DX UPPER EXTREMITY, LEFT   Final Result      OUTSIDE IMAGES-DX PELVIS   Final Result      OUTSIDE IMAGES-DX UPPER EXTREMITY, LEFT   Final Result           Assessment/Plan  * Left displaced femoral neck fracture (HCC)  Assessment  & Plan  I have reviewed the left femur x-ray, Mild displaced subcapital left femoral neck fracture   Orthopedics following, POD #1 ORIF/hemiarthroplasty   Ancef/vanc x 24 hours post op  WBAT, posterior hip precautions   I have started lovenox today, her H/H is stable   PT /OT for dispo   Continue Multimodal pain managements including po and iv narcotics prn. Monitoring respiratory status and sedation score  Fall precuations      Advance care planning  Assessment & Plan  Full Code: I discussed code status with patient . She has medical decision capacity. She wishes to have all life sustaining efforts if needed.   I discussed advance care planning with the patient for 16 minutes, including diagnosis, prognosis, plan of care, risks and benefits of any therapies that could be offered, as well as alternatives including palliation and hospice, as appropriate.        Leukocytosis  Assessment & Plan  Mild, likely reactive.   Improved to normal   monitor for signs of infection     Fall at home, initial encounter- (present on admission)  Assessment & Plan  Frequent falls  PT/OT  Fall precautions    Mild cognitive impairment  Assessment & Plan  Pt appears to have cognitive impairment on exam, I suspect dementia, per SO, he has noted cognitive decline over the last 6-8 months, she has maintained her ADLS, he does iADLs   Forgetful, needs reorientation   Limit sedatives medications as able   Delirium precautions   Mobilization and IS         VTE prophylaxis: SCDs/TEDs    I have performed a physical exam and reviewed and updated ROS and Plan today (6/10/2023). In review of yesterday's note (6/9/2023), there are no changes except as documented above.      Greater than 51 minutes spent prepping to see patient (e.g. review of tests) obtaining and/or reviewing separately obtained history. Performing a medically appropriate examination and/ evaluation.  Counseling and educating the patient/family/caregiver.  Ordering medications,  tests, or procedures.  Referring and communicating with other health care professionals.  Documenting clinical information in EPIC.  Independently interpreting results and communicating results to patient/family/caregiver.  Care coordination.

## 2023-06-10 NOTE — THERAPY
Physical Therapy   Initial Evaluation     Patient Name: Chava Bray  Age:  87 y.o., Sex:  female  Medical Record #: [id number]  Today's Date: 6/10/2023     Precautions  Precautions: Fall Risk;Posterior Hip Precautions;Weight Bearing As Tolerated Left Lower Extremity    Assessment  Patient is 87 y.o. female s/p L displaced femoral neck fx, now post op L hip hemiarthroplasty. PMHx does include dementia. PT eval completed.    Presents with significant limitations regarding functional mobility, specifically, difficulty with sit to stand and ambulated limited distances with FWW. See chart below for full details of assessment. Pt is a great candidate for acute PT services to optimize functional mobility.     She does live with boyfriend at home, but d/t current functional limitations, pt would benefit from transitioning to post acute placement prior to home.    Plan    Physical Therapy Initial Treatment Plan   Treatment Plan : Bed Mobility, Gait Training, Neuro Re-Education / Balance, Self Care / Home Evaluation, Stair Training, Therapeutic Activities, Therapeutic Exercise  Treatment Frequency: 4 Times per Week  Duration: Until Therapy Goals Met    DC Equipment Recommendations: Unable to determine at this time  Discharge Recommendations: Recommend post-acute placement for additional physical therapy services prior to discharge home       Subjective    Pt agrees to PT eval with motivation.      Objective       06/10/23 1440   Initial Contact Note    Initial Contact Note Order Received and Verified, Physical Therapy Evaluation in Progress with Full Report to Follow.   Precautions   Precautions Fall Risk;Posterior Hip Precautions;Weight Bearing As Tolerated Left Lower Extremity   Pain 0 - 10 Group   Therapist Pain Assessment Post Activity Pain Same as Prior to Activity;Nurse Notified   Prior Living Situation   Housing / Facility 1 Story House  (triplex)   Steps Into Home 0   Steps In Home 4  (however pt lives on lower  part of the home)   Equipment Owned Unable to Determine At This Time   Lives with - Patient's Self Care Capacity Alone and Able to Care For Self;Significant Other   Comments pt reports living with boyfriend. due to PMHx that includes dementia, she is somewhat of a poor historian   Prior Level of Functional Mobility   Bed Mobility Independent   Transfer Status Independent   Ambulation Independent   Ambulation Distance limited community   Assistive Devices Used Unable to Determine At This Time   Stairs Unable To Determine At This Time   History of Falls   History of Falls Yes   Date of Last Fall   (reason for admit, x1 other fall a few weeks ago.)   Cognition    Level of Consciousness Alert   Comments PMHx includes dementia, confused on place, otherwise oriented   Active ROM Lower Body    Active ROM Lower Body  X   Comments L knee extension / flexion - WFL, hip flexion 90 deg. RLE generally WFL   Strength Lower Body   Lower Body Strength  X   Comments LLE limited due to pain, no formal strength testing   Balance Assessment   Sitting Balance (Static) Fair   Sitting Balance (Dynamic) Fair -   Standing Balance (Static) Fair -   Standing Balance (Dynamic) Poor +   Weight Shift Sitting Fair   Weight Shift Standing Poor   Comments w/ FWW   Bed Mobility    Comments bed mobility not assessed, she was up in chair at time of visit   Gait Analysis   Gait Level Of Assist Contact Guard Assist   Assistive Device Front Wheel Walker   Distance (Feet) 20   # of Times Distance was Traveled 1   Deviation Step To;Antalgic   # of Stairs Climbed 0   Weight Bearing Status WBAT   Functional Mobility   Sit to Stand Minimal Assist   Bed, Chair, Wheelchair Transfer Minimal Assist   Transfer Method Stand Step   Mobility chair > sit to stand > ambulated into bathroom > returned to chair   How much difficulty does the patient currently have...   Turning over in bed (including adjusting bedclothes, sheets and blankets)? 1   Sitting down on and  standing up from a chair with arms (e.g., wheelchair, bedside commode, etc.) 1   Moving from lying on back to sitting on the side of the bed? 1   How much help from another person does the patient currently need...   Moving to and from a bed to a chair (including a wheelchair)? 3   Need to walk in a hospital room? 3   Climbing 3-5 steps with a railing? 2   6 clicks Mobility Score 11   Short Term Goals    Short Term Goal # 1 pt is able to ambulate 50 ft with FWW with CGA in 6tx to improve functional mobility.   Short Term Goal # 2 pt is able to complete bed mobility with spv in 6 tx to improve functional mobility.   Short Term Goal # 3 pt is able to complete sit to stand and transfers with CGA in 6tx to improve functional mobility.   Education Group   Education Provided Role of Physical Therapist;Hip Precautions Posterior;Gait Training;Use of Assistive Device;Weight Bearing Status   Hip Precautions Posterior Patient Response Patient;Acceptance;Explanation;Verbal Demonstration;Handout;Demonstration;Action Demonstration;Reinforcement Needed   Role of Physical Therapist Patient Response Patient;Acceptance;Explanation;Verbal Demonstration   Gait Training Patient Response Patient;Acceptance;Explanation;Verbal Demonstration;Action Demonstration   Use of Assistive Device Patient Response Patient;Acceptance;Explanation;Verbal Demonstration;Action Demonstration   Weight Bearing Status Patient Response Patient;Acceptance;Explanation;Verbal Demonstration;Action Demonstration   Physical Therapy Initial Treatment Plan    Treatment Plan  Bed Mobility;Gait Training;Neuro Re-Education / Balance;Self Care / Home Evaluation;Stair Training;Therapeutic Activities;Therapeutic Exercise   Treatment Frequency 4 Times per Week   Duration Until Therapy Goals Met   Problem List    Problems Pain;Impaired Bed Mobility;Impaired Transfers;Impaired Ambulation;Functional ROM Deficit;Functional Strength Deficit;Impaired Balance;Decreased Activity  Tolerance;Safety Awareness Deficits / Cognition;Limited Knowledge of Post-Op Precautions;Motor Planning / Sequencing   Anticipated Discharge Equipment and Recommendations   DC Equipment Recommendations Unable to determine at this time   Discharge Recommendations Recommend post-acute placement for additional physical therapy services prior to discharge home   Interdisciplinary Plan of Care Collaboration   IDT Collaboration with  Nursing;Occupational Therapist   Patient Position at End of Therapy Seated;Chair Alarm On;Call Light within Reach;Tray Table within Reach   Collaboration Comments RN updated   Session Information   Date / Session Number  6/10 - 1 (1/4 6/16)

## 2023-06-11 LAB
ANION GAP SERPL CALC-SCNC: 9 MMOL/L (ref 7–16)
BUN SERPL-MCNC: 25 MG/DL (ref 8–22)
CALCIUM SERPL-MCNC: 8.8 MG/DL (ref 8.5–10.5)
CHLORIDE SERPL-SCNC: 102 MMOL/L (ref 96–112)
CO2 SERPL-SCNC: 27 MMOL/L (ref 20–33)
CREAT SERPL-MCNC: 0.69 MG/DL (ref 0.5–1.4)
GFR SERPLBLD CREATININE-BSD FMLA CKD-EPI: 84 ML/MIN/1.73 M 2
GLUCOSE SERPL-MCNC: 113 MG/DL (ref 65–99)
POTASSIUM SERPL-SCNC: 4 MMOL/L (ref 3.6–5.5)
SODIUM SERPL-SCNC: 138 MMOL/L (ref 135–145)

## 2023-06-11 PROCEDURE — A9270 NON-COVERED ITEM OR SERVICE: HCPCS | Performed by: STUDENT IN AN ORGANIZED HEALTH CARE EDUCATION/TRAINING PROGRAM

## 2023-06-11 PROCEDURE — 51798 US URINE CAPACITY MEASURE: CPT

## 2023-06-11 PROCEDURE — 700111 HCHG RX REV CODE 636 W/ 250 OVERRIDE (IP): Performed by: STUDENT IN AN ORGANIZED HEALTH CARE EDUCATION/TRAINING PROGRAM

## 2023-06-11 PROCEDURE — 80048 BASIC METABOLIC PNL TOTAL CA: CPT

## 2023-06-11 PROCEDURE — 99223 1ST HOSP IP/OBS HIGH 75: CPT | Performed by: PHYSICAL MEDICINE & REHABILITATION

## 2023-06-11 PROCEDURE — 770001 HCHG ROOM/CARE - MED/SURG/GYN PRIV*

## 2023-06-11 PROCEDURE — 700102 HCHG RX REV CODE 250 W/ 637 OVERRIDE(OP): Performed by: STUDENT IN AN ORGANIZED HEALTH CARE EDUCATION/TRAINING PROGRAM

## 2023-06-11 PROCEDURE — 36415 COLL VENOUS BLD VENIPUNCTURE: CPT

## 2023-06-11 PROCEDURE — 99232 SBSQ HOSP IP/OBS MODERATE 35: CPT | Performed by: STUDENT IN AN ORGANIZED HEALTH CARE EDUCATION/TRAINING PROGRAM

## 2023-06-11 PROCEDURE — 97602 WOUND(S) CARE NON-SELECTIVE: CPT

## 2023-06-11 RX ADMIN — ACETAMINOPHEN 1000 MG: 500 TABLET, FILM COATED ORAL at 21:41

## 2023-06-11 RX ADMIN — ACETAMINOPHEN 1000 MG: 500 TABLET, FILM COATED ORAL at 15:20

## 2023-06-11 RX ADMIN — ENOXAPARIN SODIUM 40 MG: 100 INJECTION SUBCUTANEOUS at 16:55

## 2023-06-11 RX ADMIN — ACETAMINOPHEN 1000 MG: 500 TABLET, FILM COATED ORAL at 08:41

## 2023-06-11 RX ADMIN — OXYCODONE 5 MG: 5 TABLET ORAL at 15:17

## 2023-06-11 RX ADMIN — SENNOSIDES AND DOCUSATE SODIUM 2 TABLET: 50; 8.6 TABLET ORAL at 16:57

## 2023-06-11 RX ADMIN — OXYCODONE 5 MG: 5 TABLET ORAL at 08:39

## 2023-06-11 RX ADMIN — OXYCODONE 5 MG: 5 TABLET ORAL at 21:41

## 2023-06-11 ASSESSMENT — PAIN SCALES - PAIN ASSESSMENT IN ADVANCED DEMENTIA (PAINAD)
BREATHING: NORMAL
FACIALEXPRESSION: SMILING OR INEXPRESSIVE
CONSOLABILITY: NO NEED TO CONSOLE
BODYLANGUAGE: TENSE, DISTRESSED PACING, FIDGETING
CONSOLABILITY: NO NEED TO CONSOLE
BREATHING: NORMAL
CONSOLABILITY: NO NEED TO CONSOLE
BREATHING: NORMAL
BREATHING: NORMAL
TOTALSCORE: 6
FACIALEXPRESSION: SMILING OR INEXPRESSIVE
BREATHING: NORMAL
CONSOLABILITY: DISTRACTED OR REASSURED BY VOICE/TOUCH
BODYLANGUAGE: TENSE, DISTRESSED PACING, FIDGETING
TOTALSCORE: 1
FACIALEXPRESSION: SMILING OR INEXPRESSIVE
TOTALSCORE: 4
CONSOLABILITY: NO NEED TO CONSOLE
NEGVOCALIZATION: OCCASIONAL MOAN/GROAN, LOW SPEECH, NEGATIVE/DISAPPROVING QUALITY
BODYLANGUAGE: RELAXED
FACIALEXPRESSION: FACIAL GRIMACING
NEGVOCALIZATION: REPEATED TROUBLED CALLING OUT, LOUD MOANING/GROANING, CRYING
TOTALSCORE: 1
BODYLANGUAGE: TENSE, DISTRESSED PACING, FIDGETING
BREATHING: NORMAL
NEGVOCALIZATION: OCCASIONAL MOAN/GROAN, LOW SPEECH, NEGATIVE/DISAPPROVING QUALITY
BREATHING: OCCASIONAL LABORED BREATHING, SHORT PERIOD OF HYPERVENTILATION
TOTALSCORE: 1
FACIALEXPRESSION: SAD, FRIGHTENED, FROWN
FACIALEXPRESSION: SMILING OR INEXPRESSIVE
TOTALSCORE: 7
CONSOLABILITY: UNABLE TO CONSOLE, DISTRACT OR REASSURE
TOTALSCORE: 1
TOTALSCORE: 0
CONSOLABILITY: NO NEED TO CONSOLE
FACIALEXPRESSION: SMILING OR INEXPRESSIVE
BREATHING: NORMAL
BODYLANGUAGE: TENSE, DISTRESSED PACING, FIDGETING
BODYLANGUAGE: TENSE, DISTRESSED PACING, FIDGETING
CONSOLABILITY: DISTRACTED OR REASSURED BY VOICE/TOUCH
BODYLANGUAGE: TENSE, DISTRESSED PACING, FIDGETING
BODYLANGUAGE: RELAXED
FACIALEXPRESSION: FACIAL GRIMACING
NEGVOCALIZATION: OCCASIONAL MOAN/GROAN, LOW SPEECH, NEGATIVE/DISAPPROVING QUALITY

## 2023-06-11 ASSESSMENT — ENCOUNTER SYMPTOMS
FOCAL WEAKNESS: 0
SHORTNESS OF BREATH: 0
MEMORY LOSS: 0
SPEECH CHANGE: 0
SORE THROAT: 0
NAUSEA: 0
MYALGIAS: 1
VOMITING: 0
DOUBLE VISION: 0
NERVOUS/ANXIOUS: 0
ABDOMINAL PAIN: 0
SENSORY CHANGE: 0
BLURRED VISION: 0
DIZZINESS: 0

## 2023-06-11 ASSESSMENT — PAIN DESCRIPTION - PAIN TYPE: TYPE: SURGICAL PAIN

## 2023-06-11 NOTE — WOUND TEAM
[facility]  Inpatient Services  Initial Wound and Skin Care Evaluation    Admission Date: 6/8/2023     Last order of IP CONSULT TO WOUND CARE was found on 6/10/2023 from Hospital Encounter on 6/8/2023     HPI, PMH, SH: Reviewed    Past Surgical History:   Procedure Laterality Date    PB PARTIAL HIP REPLACEMENT Left 6/9/2023    Procedure: HEMIARTHROPLASTY, HIP;  Surgeon: Sathish Becker M.D.;  Location: SURGERY [facility];  Service: Orthopedics     Social History     Tobacco Use    Smoking status: Never    Smokeless tobacco: Never   Substance Use Topics    Alcohol use: Yes     Chief Complaint   Patient presents with    Hip Pain     GLF last night with L hip pain. Pt brought to [facility] last night and transferred here this AM      Diagnosis: Fall at home, initial encounter [W19.XXXA, Y92.009]    Unit where seen by Wound Team: T325/02     WOUND CONSULT/FOLLOW UP RELATED TO:  left upper arm skin tear     WOUND HISTORY:  Patient had scrapped her arm on dining room table while falling.     WOUND ASSESSMENT/LDA    Wound 06/10/23 Skin Tear Arm Left (Active)   Wound Image   06/11/23 1100   Site Assessment Red 06/11/23 1100   Periwound Assessment Clean;Dry;Intact 06/11/23 1100   Margins Defined edges 06/11/23 1100   Closure Secondary intention 06/11/23 1100   Drainage Amount Small 06/11/23 1100   Drainage Description Serosanguineous 06/11/23 1100   Treatments Cleansed;Irrigation;Site care 06/11/23 1100   Wound Cleansing Normal Saline Irrigation 06/11/23 1100   Periwound Protectant Skin Protectant Wipes to Periwound 06/11/23 1100   Dressing Cleansing/Solutions Not Applicable 06/11/23 1100   Dressing Options Mepitel One;Hydrofiber Silver;Silicone Adhesive Foam 06/11/23 1100   Dressing Changed New 06/11/23 1100   Dressing Status Clean;Dry;Intact 06/11/23 1100   Dressing Change/Treatment Frequency Weekly, and As Needed 06/11/23 1100   NEXT Dressing Change/Treatment Date 06/18/23 06/11/23 1100   NEXT Weekly Photo  (Inpatient Only) 06/18/23 06/11/23 1100   Non-staged Wound Description Partial thickness 06/11/23 1100   Wound Length (cm) 9 cm 06/11/23 1100   Wound Width (cm) 6 cm 06/11/23 1100   Wound Depth (cm) 0.1 cm 06/11/23 1100   Wound Surface Area (cm^2) 54 cm^2 06/11/23 1100   Wound Volume (cm^3) 5.4 cm^3 06/11/23 1100   Shape irregular deep skin tear 06/11/23 1100   Wound Odor Mild 06/11/23 1100   Exposed Structures None 06/11/23 1100   WOUND NURSE ONLY - Time Spent with Patient (mins) 15 06/11/23 1100        Vascular:    DELANEY:   No results found.    Lab Values:    Lab Results   Component Value Date/Time    WBC 10.2 06/10/2023 04:09 AM    RBC 4.36 06/10/2023 04:09 AM    HEMOGLOBIN 13.2 06/10/2023 04:09 AM    HEMATOCRIT 41.3 06/10/2023 04:09 AM        Culture Results show:  No results found for this or any previous visit (from the past 720 hour(s)).    Pain Level/Medicated:  None, Tolerated without pain medication       INTERVENTIONS BY WOUND TEAM:  Chart and images reviewed. Discussed with bedside RN. All areas of concern (based on picture review, LDA review and discussion with bedside RN) have been thoroughly assessed. Documentation of areas based on significant findings. This RN in to assess patient. Performed standard wound care which includes appropriate positioning, dressing removal and non-selective debridement. Pictures and measurements obtained weekly if/when required.  Preparation for Dressing removal: Dressing soaked with Removed without difficulty  Non-selectively Debrided with:  Normal Saline and Gauze   Sharp debridement: NA  Sarah wound: Cleansed with Normal Saline and Gauze, Prepped with No Sting  Primary Dressing: Aquacel ag (Hydrofiber Silver) and Mepitel One  Secondary (Outer) Dressing: Adhesive foam    Advanced Wound Care Discharge Planning  Number of Clinicians necessary to complete wound care: 1  Is patient requiring IV pain medications for dressing changes: no  Length of time for dressing change 5  min. (This does not include chart review, pre-medication time, set up, clean up or time spent charting.)    Interdisciplinary consultation: Patient, Bedside RN (Melanie)     EVALUATION / RATIONALE FOR TREATMENT:  Most Recent Date:  6/11/23: Patient had a tripped and fell in her dining room.  Scrapped her left arm on the table while going down.  The wound is a deep partial thickness. Mepitel one silicone non-adherent layer placed over skin tear first and then Aquacel Ag Hydrofiber applied to manage bioburden, absorb exudate, and maintain a moist wound environment without laterally wicking exudate therefore reducing soha-wound maceration.  There was a odor to the wound, in which the Ag hydrofiber will help with anti-microbial properties.   Dressing orders are in place for nursing to complete.  Skin tear will likely resolve within 2 weeks.        Goals: Steady decrease in wound area and depth weekly.    WOUND TEAM PLAN OF CARE ([X] for frequency of wound follow up,):   Nursing to follow dressing orders written for wound care. Contact wound team if area fails to progress, deteriorates or with any questions/concerns if something comes up before next scheduled follow up (See below as to whether wound is following and frequency of wound follow up)  Dressing changes by wound team:                   Follow up 3 times weekly:                NPWT change 3 times weekly:     Follow up 1-2 times weekly:      Follow up Bi-Monthly:           Follow up Monthly (High Risk):                        Follow up as needed:   X  Other (explain):     NURSING PLAN OF CARE ORDERS (X):  Dressing changes: See Dressing Care orders: x  Skin care: See Skin Care orders:   RN Prevention Protocol:   Rectal tube care: See Rectal Tube Care orders:   Other orders:    RSKIN:   CURRENTLY IN PLACE (X), APPLIED THIS VISIT (A), ORDERED (O):   Q shift Reinaldo:  X  Q shift pressure point assessments:  X    Surface/Positioning   Standard Mattress/Trauma Bed    X        Low Airloss          ICU Low Airloss   Bariatric YURI     Waffle cushion        Waffle Overlay  X        Reposition q 2 hours      TAPs Turning system     Z Alistair Pillow     Offloading/Redistribution NA  Sacral Offloading Dressing (Silicone dressing)     Heel Offloading Dressing (Silicone dressing)         Heel float boots (Prevalon boot)             Float Heels off Bed with Pillows           Respiratory NA, room air  Silicone O2 tubing         Gray Foam Ear protectors     Cannula fixation Device (Tender )          High flow offloading Clip    Elastic head band offloading device      Anchorfast                                                         Trach with Optifoam split foam             Containment/Moisture Prevention     Rectal tube or BMS    Purwick/Condom Cath    X    Jaquez Catheter    Barrier wipes           Barrier paste       Antifungal tx      Interdry        Mobilization       Up to chair     x   Ambulate      PT/OT  X    Nutrition       Dietician        Diabetes Education      PO   X  TF     TPN     NPO   # days     Other        Anticipated discharge plans:   LTACH:        SNF/Rehab:      X            Home Health Care:   X        Outpatient Wound Center:            Self/Family Care:        Other:                  Vac Discharge Needs: NA  Vac Discharge plan is purely a recommendation from wound team and not a requirement for discharge unless otherwise stated by physician.  Not Applicable Pt not on a wound vac:   X    Regular Vac while inpatient, alternative dressing at DC:        Regular Vac in use and continued at DC:            Reg. Vac w/ Skin Sub/Biologic in use. Will need to be changed 2x wkly:      Veraflo Vac while inpatient, ok to transition to Regular Vac on Discharge (Bedside RN to Clamp small instillation tubing at time of DC):           Veraflo Vac while inpatient, would benefit from remaining on Veraflo Vac upon discharge:

## 2023-06-11 NOTE — PROGRESS NOTES
"Orthopaedic Progress Note    Interval changes:  Patient doing well is pleasantly confused  Left hip dressings are CDI  Cleared for DC to SNF by ortho pending medicine clearance    ROS - Patient denies any new issues, but is a poor historian.  Pain well controlled.    /56   Pulse 79   Temp 36.9 °C (98.4 °F) (Temporal)   Resp 17   Ht 1.626 m (5' 4\")   Wt 58.5 kg (129 lb)   SpO2 92%     Patient seen and examined  No acute distress  Breathing non labored  RRR  Left hip surgical dressing is clean, dry, and intact. Patient clearly fires tibialis anterior, EHL, and gastrocnemius/soleus. Sensation is intact to light touch throughout superficial peroneal, deep peroneal, tibial, saphenous, and sural nerve distributions. Strong and palpable 2+ dorsalis pedis and posterior tibial pulses with capillary refill less than 2 seconds. No lower leg tenderness or discomfort.     Recent Labs     06/09/23  0123 06/10/23  0409   WBC 9.6 10.2   RBC 4.63 4.36   HEMOGLOBIN 13.9 13.2   HEMATOCRIT 43.6 41.3   MCV 94.2 94.7   MCH 30.0 30.3   MCHC 31.9* 32.0*   RDW 48.8 48.7   PLATELETCT 175 177   MPV 11.1 10.8       Active Hospital Problems    Diagnosis     Fall at home, initial encounter [W19.XXXA, Y92.009]     Left displaced femoral neck fracture (HCC) [S72.002A]     Leukocytosis [D72.829]     Advance care planning [Z71.89]     Mild cognitive impairment [G31.84]        Assessment/Plan:  Patient doing well is pleasantly confused  Left hip dressings are CDI  Cleared for DC to SNF by ortho pending medicine clearance  POD#2 S/P Open treatment of left femoral neck fracture with hemiarthroplasty.  Wt bearing status - WBAT with post hip precautions  Wound care/Drains - Dressings to be left in place  Future Procedures - non planned   Lovenox: Start 6/10, Duration-until ambulatory > 150'  Sutures/Staples out- 14-21 days post operatively. Removal will completed by ortho mid levels only.  PT/OT-initiated  Antibiotics: Perioperative " completed  DVT Prophylaxis- TEDS/SCDs/Foot pumps  Jaquez-not needed per ortho  Case Coordination for Discharge Planning - Disposition per therapy recs.

## 2023-06-11 NOTE — CARE PLAN
Problem: Pain - Standard  Goal: Alleviation of pain or a reduction in pain to the patient’s comfort goal  Outcome: Progressing  Note: Pt states pain 5/10. Medications administered per MAR, ice pack applied, pillows for comfort and repositioning.      Problem: Fall Risk  Goal: Patient will remain free from falls  Outcome: Progressing  Note: Bed is locked and in lowest position, treaded socks on, bed alarm on, DME out of sight, call light and belongings within reach, pt calls appropriately for assistance, hourly rounding in place.    The patient is Stable - Low risk of patient condition declining or worsening    Shift Goals  Clinical Goals: patient safety will be ensured throughout this shift  Patient Goals: GUALBERTO confused    Progress made toward(s) clinical / shift goals:      Patient is not progressing towards the following goals:

## 2023-06-11 NOTE — THERAPY
Occupational Therapy   Initial Evaluation     Patient Name: Chava Bray  Age:  87 y.o., Sex:  female  Medical Record #: [id number]  Today's Date: 6/10/2023     Precautions  Precautions: Fall Risk, Posterior Hip Precautions, Weight Bearing As Tolerated Left Lower Extremity    Assessment  Patient is 87 y.o. female s/p L displaced femoral neck fx, now post op L hip hemiarthroplasty. PMHx does include dementia.  Pt seen for OT eval and presents with decreased balance, strength, activity tolerance, pain, and limited insight into deficits/safety. Pt also tangential with responses to questions and attention, though follows cues. She is requiring max A for LB dressing, CGA for grooming at sink, min A STS with FWW, and CGA to ambulate in room with FWW.  Pt is a poor historian and PLOF unknown, though pt with Hx of falls. At this time recommend post acute placement. Ongoing acute OT to address her functional deficits.     Plan    Occupational Therapy Initial Treatment Plan   Treatment Interventions: Self Care / Activities of Daily Living, Adaptive Equipment, Neuro Re-Education / Balance, Therapeutic Activity  Treatment Frequency: 4 Times per Week  Duration: Until Therapy Goals Met    DC Equipment Recommendations: Unable to determine at this time  Discharge Recommendations: Recommend post-acute placement for additional occupational therapy services prior to discharge home     Subjective    Pt agreeable to work with OT, very pleasant.      Objective       06/10/23 1441   Charge Group   OT Evaluation OT Evaluation High   Total Time Spent   OT Time Spent Yes   OT Evaluation (Minutes) 30   OT Total Time Spent (Calculated) 30    Services   Is patient using  services for this encounter? No   Initial Contact Note    Initial Contact Note Order Received and Verified, Occupational Therapy Evaluation in Progress with Full Report to Follow.   Prior Living Situation   Housing / Facility 1 [facility] [place] [facility]""  (triplex)    Steps Into Home 0   Steps In Home 4  (lives on lower part of home)   Equipment Owned Unable to Determine At This Time   Lives with - Patient's Self Care Capacity Alone and Able to Care For Self;Significant Other   Comments pt reports living with boyfriend. due to PMHx that includes dementia, she is somewhat of a poor historian   Prior Level of ADL Function   Self Feeding Independent   Grooming / Hygiene Independent   Bathing Independent   Dressing Independent   Toileting Independent   Comments Per pt   Prior Level of IADL Function   Comments Difficulty obtaining PLOF, pt poor historian and also tangential in conversation   History of Falls   History of Falls Yes   Date of Last Fall   (reason for admit, x1 other fall a few weeks ago)   Precautions   Precautions Fall Risk;Posterior Hip Precautions;Weight Bearing As Tolerated Left Lower Extremity   Pain 0 - 10 Group   Therapist Pain Assessment Post Activity Pain Same as Prior to Activity;Nurse Notified  (pre medicated)   Cognition    Cognition / Consciousness X   Orientation Level   (Inconsisntely oriented, oriented to self, reason for admit, and location (city, [place] saying [place] in a [place]el[place]   Level of Consciousness Alert   Ability To Follow Commands 1 Step   New Learning Impaired   Attention Impaired   Comments Hx of dementia, pt with disorganized responses, decreased attention - though pleasant and participatory   Active ROM Upper Body   Active ROM Upper Body  WDL   Strength Upper Body   Upper Body Strength  WDL   Balance Assessment   Sitting Balance (Static) Fair   Sitting Balance (Dynamic) Fair -   Standing Balance (Static) Fair -   Standing Balance (Dynamic) Poor +   Weight Shift Sitting Fair   Weight Shift Standing Poor   Comments w/ FWW   Bed Mobility    Comments received up in chair and returned  to chair   ADL Assessment   Eating Modified Independent  (set-up at chair level)   Grooming Contact Guard Assist  (hand hygiene standing at sink)    Lower Body Dressing Maximal Assist  (to don socks)   How much help from another person does the patient currently need...   Putting on and taking off regular lower body clothing? 1   Bathing (including washing, rinsing, and drying)? 2   Toileting, which includes using a toilet, bedpan, or urinal? 3   Putting on and taking off regular upper body clothing? 3   Taking care of personal grooming such as brushing teeth? 4   Eating meals? 4   6 Clicks Daily Activity Score 17   Functional Mobility   Sit to Stand Minimal Assist   Comments Ambulated in room with CGA and FWW   Activity Tolerance   Sitting in Chair left up in chair   Standing 3 min standing at sink   Patient / Family Goals   Patient / Family Goal #1 to go home   Short Term Goals   Short Term Goal # 1 Pt will recall post op precautions with SPV and use of handout   Short Term Goal # 2 Pt will complete toileting with SPV   Short Term Goal # 3 Pt will complete 3 grooming tasks standing at sink with SPV   Short Term Goal # 4 Pt will complete toilet transfers with SPV   Short Term Goal # 5 Pt will complete LB dressing with min A and use of AE as needed   Education Group   Education Provided Hip Precautions;Role of Occupational Therapist;Weight Bearing Precautions   Role of Occupational Therapist Patient Response Patient;Acceptance;Explanation;Verbal Demonstration   Hip Precautions Patient Response Patient;Acceptance;Explanation;Verbal Demonstration;Reinforcement Needed   Weight Bearing Precautions Patient Response Patient;Acceptance;Explanation;Verbal Demonstration;Action Demonstration   Occupational Therapy Initial Treatment Plan    Treatment Interventions Self Care / Activities of Daily Living;Adaptive Equipment;Neuro Re-Education / Balance;Therapeutic Activity   Treatment Frequency 4 Times per Week   Duration Until Therapy Goals Met   Problem List   Problem List Decreased Active Daily Living Skills;Decreased Functional Mobility;Decreased Activity  Tolerance;Safety Awareness Deficits / Cognition;Impaired Cognitive Function;Impaired Postural Control / Balance;Limited Knowledge of Post Op Precautions   Anticipated Discharge Equipment and Recommendations   DC Equipment Recommendations Unable to determine at this time   Discharge Recommendations Recommend post-acute placement for additional occupational therapy services prior to discharge home   Interdisciplinary Plan of Care Collaboration   IDT Collaboration with  Nursing;Physical Therapist   Patient Position at End of Therapy Seated;Chair Alarm On;Call Light within Reach;Tray Table within Reach   Collaboration Comments RN updated   Session Information   Date / Session Number  6/10 1 (1/4, 6/16)

## 2023-06-11 NOTE — PROGRESS NOTES
Hospital Medicine Daily Progress Note    Date of Service  6/11/2023    Chief Complaint  Chava Bray is a 87 y.o. female admitted 6/8/2023 with left hip fracture after GLF    Hospital Course  Chava Bray is a 87 y.o. female with a past medical history of hyperlipidemia, osteoporosis and thyroid disease who presented 6/8/2023 with GLF fall with L hip pain. Patient reports she was tripped and fell a couple days ago and resulting L hip pain and has been unable to ambulate. On evaluation, her CBC and BMP unremarkable except mild leukocytosis with wbc 10.9, left femur and pelvis xray notes Mild displaced subcapital left femoral neck fracture .  Case was discussed with orthopedics and she was admitted for operative repair.     Interval Problem Update  Pt seen and examined at bedside, she is awake and alert, confused certain aspects of her care, confusion worse at night   - pain currently tolerable   - PT/OT recommending post acute, pt is happy about this. Referral placed to IPR and SNF. If able to tolerate acute rehab this would be SO preference.     - delirium precuations, avoid oversedation, agressive mobilization and IS   - vitals and labs reviewed, renal function improved.       - WBAT,posterior hip precautions, chemical dvt ppx, pending placement       I have discussed this patient's plan of care and discharge plan at IDT rounds today with Case Management, Nursing, Nursing leadership, and other members of the IDT team.    Consultants/Specialty  orthopedics    Code Status  Full Code    Disposition  The patient is medically cleared for discharge to home or a post-acute facility.    Acute rehab vs SNF     I have placed the appropriate orders for post-discharge needs.    Review of Systems  Review of Systems   Constitutional:  Negative for malaise/fatigue.   HENT:  Negative for sore throat.    Eyes:  Negative for blurred vision and double vision.   Respiratory:  Negative for shortness of breath.     Cardiovascular:  Negative for chest pain and leg swelling.   Gastrointestinal:  Negative for abdominal pain, nausea and vomiting.   Genitourinary:  Negative for dysuria and urgency.   Musculoskeletal:  Positive for joint pain and myalgias.   Neurological:  Negative for dizziness, sensory change, speech change and focal weakness.   Psychiatric/Behavioral:  Negative for memory loss. The patient is not nervous/anxious.         Physical Exam  Temp:  [36.7 °C (98.1 °F)-37.1 °C (98.8 °F)] 36.7 °C (98.1 °F)  Pulse:  [76-84] 76  Resp:  [15-17] 15  BP: (100-137)/(68-70) 103/70  SpO2:  [90 %-96 %] 90 %    Physical Exam  Vitals reviewed.   Constitutional:       Appearance: She is not ill-appearing.      Comments: Thin, frail elderly female, does appear younger than stated age   HENT:      Head: Normocephalic and atraumatic.      Mouth/Throat:      Mouth: Mucous membranes are moist.   Eyes:      Extraocular Movements: Extraocular movements intact.      Conjunctiva/sclera: Conjunctivae normal.   Cardiovascular:      Rate and Rhythm: Normal rate and regular rhythm.      Heart sounds: Normal heart sounds.   Pulmonary:      Breath sounds: Normal breath sounds.   Abdominal:      General: Bowel sounds are normal.      Palpations: Abdomen is soft.   Musculoskeletal:         General: Swelling, tenderness and signs of injury present.      Cervical back: Neck supple.      Comments: Left post op hip dressing    Skin:     General: Skin is warm.   Neurological:      Mental Status: She is alert and oriented to person, place, and time.   Psychiatric:         Mood and Affect: Mood normal.         Behavior: Behavior normal.         Cognition and Memory: Memory is impaired. She exhibits impaired recent memory.      Comments: Forgetful          Fluids  No intake or output data in the 24 hours ending 06/11/23 1112      Laboratory  Recent Labs     06/09/23  0123 06/10/23  0409   WBC 9.6 10.2   RBC 4.63 4.36   HEMOGLOBIN 13.9 13.2   HEMATOCRIT  43.6 41.3   MCV 94.2 94.7   MCH 30.0 30.3   MCHC 31.9* 32.0*   RDW 48.8 48.7   PLATELETCT 175 177   MPV 11.1 10.8     Recent Labs     06/09/23  0123 06/10/23  0409 06/11/23  0205   SODIUM 137 138 138   POTASSIUM 3.7 3.8 4.0   CHLORIDE 102 99 102   CO2 22 25 27   GLUCOSE 109* 124* 113*   BUN 23* 33* 25*   CREATININE 0.75 0.98 0.69   CALCIUM 9.0 9.0 8.8                     Imaging  DX-PELVIS-1 OR 2 VIEWS   Final Result      1.  Status post LEFT hip arthroplasty   2.  RIGHT hip osteoarthritis      DX-FEMUR-2+ LEFT   Final Result      Mild displaced subcapital left femoral neck fracture      DX-PELVIS-1 OR 2 VIEWS   Final Result      Mildly displaced subcapital left femoral neck fracture      DX-CHEST-PORTABLE (1 VIEW)   Final Result      1.  Cardiomegaly. No overt failure or pneumonia.      OUTSIDE IMAGES-DX CHEST   Final Result      OUTSIDE IMAGES-DX UPPER EXTREMITY, LEFT   Final Result      OUTSIDE IMAGES-DX PELVIS   Final Result      OUTSIDE IMAGES-DX UPPER EXTREMITY, LEFT   Final Result           Assessment/Plan  * Left displaced femoral neck fracture (HCC)  Assessment & Plan  I have reviewed the left femur x-ray, Mild displaced subcapital left femoral neck fracture   Orthopedics following, POD #2 ORIF/hemiarthroplasty   Ancef/vanc x 24 hours post op  WBAT, posterior hip precautions   Cont. lovenox for dvt ppx   PT /OT recommending post acute, IPR and SNF referral placed   Continue Multimodal pain managements including po and iv narcotics prn. Monitoring respiratory status and sedation score  Fall precuations      Advance care planning  Assessment & Plan  Full Code: I discussed code status with patient . She has medical decision capacity. She wishes to have all life sustaining efforts if needed.   I discussed advance care planning with the patient for 16 minutes, including diagnosis, prognosis, plan of care, risks and benefits of any therapies that could be offered, as well as alternatives including palliation and  hospice, as appropriate.        Leukocytosis  Assessment & Plan  Mild, likely reactive.   Improved to normal   monitor for signs of infection     Fall at home, initial encounter- (present on admission)  Assessment & Plan  Frequent falls  PT/OT  Fall precautions    Mild cognitive impairment  Assessment & Plan  Pt appears to have cognitive impairment on exam, I suspect dementia, per SO, he has noted cognitive decline over the last 6-8 months, she has maintained her ADLS, he does iADLs   Forgetful, needs reorientation   Limit sedatives medications as able   Delirium precautions   Mobilization and IS         VTE prophylaxis: SCDs/TEDs and enoxaparin ppx    I have performed a physical exam and reviewed and updated ROS and Plan today (6/11/2023). In review of yesterday's note (6/10/2023), there are no changes except as documented above.

## 2023-06-11 NOTE — PROGRESS NOTES
"      Orthopaedic Progress Note    Interval changes:  Patient doing well post op- pleasantly confused  Left hip dressings are CDI  Cleared for DC to SNF by ortho pending medicine clearance    ROS - Patient denies any new issues, but is a poor historian.  Pain well controlled.    /69   Pulse 84   Temp 37.1 °C (98.8 °F) (Temporal)   Resp 17   Ht 1.626 m (5' 4\")   Wt 58.5 kg (129 lb)   SpO2 96%     Patient seen and examined  No acute distress  Breathing non labored  RRR  Left hip surgical dressing is clean, dry, and intact. Patient clearly fires tibialis anterior, EHL, and gastrocnemius/soleus. Sensation is intact to light touch throughout superficial peroneal, deep peroneal, tibial, saphenous, and sural nerve distributions. Strong and palpable 2+ dorsalis pedis and posterior tibial pulses with capillary refill less than 2 seconds. No lower leg tenderness or discomfort.     Recent Labs     06/08/23  0859 06/09/23  0123 06/10/23  0409   WBC 10.9* 9.6 10.2   RBC 4.99 4.63 4.36   HEMOGLOBIN 15.0 13.9 13.2   HEMATOCRIT 46.8 43.6 41.3   MCV 93.8 94.2 94.7   MCH 30.1 30.0 30.3   MCHC 32.1* 31.9* 32.0*   RDW 47.8 48.8 48.7   PLATELETCT 190 175 177   MPV 11.2 11.1 10.8       Active Hospital Problems    Diagnosis     Fall at home, initial encounter [W19.XXXA, Y92.009]     Left displaced femoral neck fracture (HCC) [S72.002A]     Leukocytosis [D72.829]     Advance care planning [Z71.89]     Mild cognitive impairment [G31.84]        Assessment/Plan:  Patient doing well post op- pleasantly confused  Left hip dressings are CDI  Cleared for DC to SNF by ortho pending medicine clearance  POD#1 S/P Open treatment of left femoral neck fracture with hemiarthroplasty.  Wt bearing status - WBAT with post hip precautions  Wound care/Drains - Dressings to be left in place  Future Procedures - non planned   Lovenox: Start 6/10, Duration-until ambulatory > 150'  Sutures/Staples out- 14-21 days post operatively. Removal will " completed by ortho mid levels only.  PT/OT-initiated  Antibiotics: Perioperative completed  DVT Prophylaxis- TEDS/SCDs/Foot pumps  Jaquez-not needed per ortho  Case Coordination for Discharge Planning - Disposition per therapy recs.

## 2023-06-11 NOTE — DISCHARGE PLANNING
Spoke with Shawn about goals and expectation for IRF level of care. Discussed therapy plan for 3 hours per day 5 days a week. Discussed therapy schedules 30/45 or 60 minute sessions typically 1.5 hours between breakfast and lunch and another 1.5 hours between lunch and dinner. Discussed PT/OT and SLP roles. Discussed potential length of stay. Discussed insurance Medicare A&B with a supplement coverage for the program. Discussed Covid visitation and clothing requirements. Shawn will be primary support for Chava  to return to the community. Discussed participation with therapy program when visiting.  Per Shawn PLOF independent with mobility and self care. Shawn does all the cooking and shopping and Shawn does the driving.  Shawn tells me that Chava was independent with self care ,incontinent of bladder but manages her depends. Home is multi level with 12 and 4 a total of 16 step to enter to the living area.Shawn tells me that she had no problem managing these step in fact it was part of her exercise program at home. Shawn tells me that he is strong enough to help her up the stairs. Shawn tells me that her short term memory was a bit off but long term memory was intact. Shawn does memory exercised with her.   Discussed discharge planner role and team conference. DME in the home includes a walker. PM&R referral from Dr. [facility] to consult per protocol. Medicare provider.

## 2023-06-11 NOTE — CONSULTS
"    Physical Medicine and Rehabilitation Consultation              Date of initial consultation: 6/11/2023  Requesting provider: ordered by Kellie Mcdermott M.D. at 06/11/23 1126  Consulting provider: Christine Mao D.O.  Reason for consultation: assess for acute inpatient rehab appropriateness  LOS: 3 Day(s)    Chief complaint: Left hip pain after ground-level fall    HPI: The patient is a 87 y.o. female with a past medical history of hyperlipidemia, hypothyroidism, history of arthritis, possible baseline dementia;  who presented on 6/8/2023  8:43 AM with left hip pain after ground-level fall.  Per documentation, patient had tripped and fallen on her left side a couple of days prior to presenting to the emergency department.  Patient did not hit her head and had no loss of consciousness.  Upon evaluation in the emergency department, images were obtained that showed a left femoral neck fracture.  Orthopedic surgery was consulted, and patient was taken to the OR on 6/9 for open treatment of left displaced femoral neck fracture with hemiarthroplasty performed by Dr. Becker.  Postop, patient has posterior precautions and is WBAT LLE.  Postop, patient's leukocytosis has improved.  Her hemoglobin has remained stable.  She has mild hypotension but has been asymptomatic.  She has been hypoxic, requiring 1 L supplemental O2.  Is not on oxygen at home.    Patient seen and examined at bedside. Reports she feels ok, but wants some lotion of her legs. Reports Left hip pain with movement. Does not report HA, lightheadedness, SOB, CP, abdominal pain, or changes in numbness/tingling/weakness.  Reviewed post acute rehab with patient, she tells me \"it sounds awful\", but would be willing to go to rehab if Shawn thinks its a good idea.       Social Hx:  Patient lives alone, may also be living with a possible boyfriend, in a one-story house with 4 steps inside (per therapy notes)   0 BG, per  boyfriend, has 16 BG  (was previously " able to navigate independently)   At prior level of function patient was Independent with mobility and ADLs, was able to navigate stairs independently. Boyfriend Shawn helps with memory issues.     Tobacco: Denies  Alcohol: Denies  Drugs: Denies    THERAPY:  Restrictions: Fall risk, posterior hip precautions, WBAT LLE  PT: Functional mobility   6/10 contact-guard assist with an FW W x20 feet, min assist sit to stand    OT: ADLs  6/10 max assist lower body dressing min assist sit to stand    SLP:   None    IMAGING:  DX-PELVIS-1 OR 2 VIEWS   Final Result       1.  Status post LEFT hip arthroplasty   2.  RIGHT hip osteoarthritis       DX-FEMUR-2+ LEFT   Final Result       Mild displaced subcapital left femoral neck fracture       DX-PELVIS-1 OR 2 VIEWS   Final Result       Mildly displaced subcapital left femoral neck fracture       DX-CHEST-PORTABLE (1 VIEW)   Final Result       1.  Cardiomegaly. No overt failure or pneumonia.       PROCEDURES:  6/9 open treatment of left displaced femoral neck fracture with hemiarthroplasty, performed by Dr. Becker    PMH:  Past Medical History:   Diagnosis Date    Arthritis     Headache(784.0)     Headache, classical migraine     Hyperlipidemia     Osteoporosis, unspecified     Thyroid disease        PSH:  Past Surgical History:   Procedure Laterality Date    PB PARTIAL HIP REPLACEMENT Left 6/9/2023    Procedure: HEMIARTHROPLASTY, HIP;  Surgeon: Sathish Becker M.D.;  Location: SURGERY [facility];  Service: Orthopedics       FHX:  Family History   Problem Relation Age of Onset    Other Neg Hx        Medications:  Current Facility-Administered Medications   Medication Dose    enoxaparin (Lovenox) inj 40 mg  40 mg    acetaminophen (TYLENOL) tablet 1,000 mg  1,000 mg    [START ON 6/13/2023] acetaminophen (Tylenol) tablet 650 mg  650 mg    senna-docusate (PERICOLACE or SENOKOT S) 8.6-50 MG per tablet 2 Tablet  2 Tablet    And    polyethylene glycol/lytes (MIRALAX) PACKET 1 Packet  1  "Packet    And    magnesium hydroxide (MILK OF MAGNESIA) suspension 30 mL  30 mL    And    bisacodyl (DULCOLAX) suppository 10 mg  10 mg    Pharmacy Consult Request ...Pain Management Review 1 Each  1 Each    oxyCODONE immediate-release (ROXICODONE) tablet 2.5 mg  2.5 mg    Or    oxyCODONE immediate-release (ROXICODONE) tablet 5 mg  5 mg    Or    morphine 4 MG/ML injection 2 mg  2 mg    ondansetron (ZOFRAN) syringe/vial injection 4 mg  4 mg    ondansetron (ZOFRAN ODT) dispertab 4 mg  4 mg       Allergies:  Allergies   Allergen Reactions    Sulfa Drugs Unspecified     Unknown childhood reaction        Physical Exam:  Vitals: /70   Pulse 76   Temp 36.7 °C (98.1 °F) (Temporal)   Resp 15   Ht 1.626 m (5' 4\")   Wt 58.5 kg (129 lb)   SpO2 90%   Gen: NAD, seated comfortably in chair   Head:  NC/AT  Eyes/ Nose/ Mouth: PERRLA, moist mucous membranes  Cardio: RRR, good distal perfusion, warm extremities  Pulm: normal respiratory effort, no cyanosis, on RA    Abd: Soft NTND  Ext: No peripheral edema. No calf tenderness. No clubbing. Left arm bruising, wound dressing in place     Mental status:  A&Ox4 (person, place, date, situation) follows commands, does  not know the month   Speech: fluent, no aphasia or dysarthria    CRANIAL NERVES:  2,3: visual acuity grossly intact, PERRL  3,4,6: EOMI bilaterally, no nystagmus or diplopia  5: sensation intact to light touch bilaterally and symmetric  7: no facial asymmetry  8: hearing grossly intact    Motor:      Upper Extremity  Myotome R L   Shoulder flexion C5 5/5 5/5   Elbow flexion C5 5/5 5/5   Wrist extension C6 5/5 5/5   Elbow extension C7 5/5 5/5   Finger flexion C8 5/5 5/5   Finger abduction T1 5/5 5/5     Lower Extremity Myotome R L   Hip flexion L2 5/5 3/5   Knee extension L3 5/5 3, limited by pain /5   Ankle dorsiflexion L4 5/5 5/5   Toe extension L5 5/5 5/5   Ankle plantarflexion S1 5/5 5/5       Negative Pronator drift bilaterally    Sensory:   intact to light " touch through out    DTRs: 2+ in bilateral  biceps  No clonus at bilateral ankles  Negative [place] b/l     Tone: no spasticity noted, no cogwheeling noted    Coordination:   intact finger to nose bilaterally  intact fine motor with fingers bilaterally      Labs: Reviewed and significant for   Recent Labs     06/09/23 0123 06/10/23  0409   RBC 4.63 4.36   HEMOGLOBIN 13.9 13.2   HEMATOCRIT 43.6 41.3   PLATELETCT 175 177     Recent Labs     06/09/23  0123 06/10/23  0409 06/11/23  0205   SODIUM 137 138 138   POTASSIUM 3.7 3.8 4.0   CHLORIDE 102 99 102   CO2 22 25 27   GLUCOSE 109* 124* 113*   BUN 23* 33* 25*   CREATININE 0.75 0.98 0.69   CALCIUM 9.0 9.0 8.8     Recent Results (from the past 24 hour(s))   Basic Metabolic Panel    Collection Time: 06/11/23  2:05 AM   Result Value Ref Range    Sodium 138 135 - 145 mmol/L    Potassium 4.0 3.6 - 5.5 mmol/L    Chloride 102 96 - 112 mmol/L    Co2 27 20 - 33 mmol/L    Glucose 113 (H) 65 - 99 mg/dL    Bun 25 (H) 8 - 22 mg/dL    Creatinine 0.69 0.50 - 1.40 mg/dL    Calcium 8.8 8.5 - 10.5 mg/dL    Anion Gap 9.0 7.0 - 16.0   ESTIMATED GFR    Collection Time: 06/11/23  2:05 AM   Result Value Ref Range    GFR (CKD-EPI) 84 >60 mL/min/1.73 m 2         ASSESSMENT:  Patient is a 87 y.o. female admitted with left hip fracture    C Code / Diagnosis to Support: 0008.11 - Orthopaedic Disorders: Status Post Unilateral Hip Fracture    Rehabilitation: Impaired ADLs and mobility  Patient is a good candidate for inpatient rehab based on needs for PT, OT, see dispo details below.     Barriers to transfer include: Insurance authorization, TCCs to verify disposition, medical clearance and bed availability     Additional Recommendations:  Left hip fracture  - Sustained during ground-level fall  - Images revealed left femoral neck fracture  - Orthopedic surgery consulted  - Status post 6/9 open treatment of left femoral neck fracture with hemiarthroplasty performed by Dr. Becker  - Posterior  precautions, WBAT LLE  - Continue with PT/OT, may potentially be impaired with carryover due to dementia, needs training for stair management     Hypoxia  - Is not on oxygen at home  - Is requiring 1 L supplemental O2  - Continue to wean as able, O2 tubing creates risk for trip hazard if planning to discharge home    Hypotension  - Is not impairing mobility  - SBP down to 100/68, asymptomatic  - SABRINA hose ordered       Urinary incontinence  - per SO, has had episodes of urinary incont   - currently using purewick  - recommend timed voids and bladder scan to confirm no over flow incont  - recommend IRF for improvement with incontinence management     Constipation  - No BM since prior to admission  - Continue with scheduled twice daily Senokot  - Changing MiraLAX from as needed to daily    Skin  - left upper arm skin tear   -wound care following, monitor for further breakdown     Dispo:  - patient is currently functioning below their level of baseline, recommend post acute rehab    - recommend IRF level therapy with 3hr of therapy 5 days per week   - piror to acceptance to IRF, will need confirmation of DC support from SO Shawn and constipation resolved   - TCC to assist with insurance auth and DC support         Medical Complexity:  Left hip fracture  Hypoxia  Hypotension  Constipation  Impaired mobility and ADLs      DVT PPX: Lovenox      Thank you for allowing us to participate in the care of this patient.     Patient was seen for 81  minutes on unit/floor of which > 50% of time was spent on counseling and coordination of care regarding the above, including prognosis, risk reduction, benefits of treatment, and options for next stage of care.    Christine Mao D.O.   Physical Medicine and Rehabilitation     Please note that this dictation was created using voice recognition software. I have made every reasonable attempt to correct obvious errors, but there may be errors of grammar and possibly content that I did not  discover before finalizing the note.

## 2023-06-12 ENCOUNTER — HOSPITAL ENCOUNTER (INPATIENT)
Facility: REHABILITATION | Age: 88
LOS: 14 days | DRG: 560 | End: 2023-06-26
Attending: PHYSICAL MEDICINE & REHABILITATION | Admitting: PHYSICAL MEDICINE & REHABILITATION
Payer: MEDICARE

## 2023-06-12 VITALS
OXYGEN SATURATION: 97 % | RESPIRATION RATE: 16 BRPM | BODY MASS INDEX: 22.02 KG/M2 | DIASTOLIC BLOOD PRESSURE: 55 MMHG | WEIGHT: 129 LBS | SYSTOLIC BLOOD PRESSURE: 130 MMHG | TEMPERATURE: 98.1 F | HEIGHT: 64 IN | HEART RATE: 82 BPM

## 2023-06-12 DIAGNOSIS — S72.002A LEFT DISPLACED FEMORAL NECK FRACTURE (HCC): ICD-10-CM
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
## 2023-06-12 DIAGNOSIS — G31.84 MILD COGNITIVE IMPAIRMENT: ICD-10-CM
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
## 2023-06-12 DIAGNOSIS — I10 PRIMARY HYPERTENSION: ICD-10-CM

## 2023-06-12 PROBLEM — Z87.81 S/P LEFT HIP FRACTURE: Status: ACTIVE | Noted: 2023-06-12

## 2023-06-12 LAB
FLUAV RNA SPEC QL NAA+PROBE: NEGATIVE
FLUBV RNA SPEC QL NAA+PROBE: NEGATIVE
RSV RNA SPEC QL NAA+PROBE: NEGATIVE
SARS-COV-2 RNA RESP QL NAA+PROBE: NOTDETECTED
SPECIMEN SOURCE: NORMAL

## 2023-06-12 PROCEDURE — 0241U HCHG SARS-COV-2 COVID-19 NFCT DS RESP RNA 4 TRGT MIC: CPT

## 2023-06-12 PROCEDURE — 700111 HCHG RX REV CODE 636 W/ 250 OVERRIDE (IP): Performed by: PHYSICAL MEDICINE & REHABILITATION

## 2023-06-12 PROCEDURE — 99223 1ST HOSP IP/OBS HIGH 75: CPT | Performed by: PHYSICAL MEDICINE & REHABILITATION

## 2023-06-12 PROCEDURE — 770010 HCHG ROOM/CARE - REHAB SEMI PRIVAT*

## 2023-06-12 PROCEDURE — 94760 N-INVAS EAR/PLS OXIMETRY 1: CPT

## 2023-06-12 PROCEDURE — A9270 NON-COVERED ITEM OR SERVICE: HCPCS | Performed by: STUDENT IN AN ORGANIZED HEALTH CARE EDUCATION/TRAINING PROGRAM

## 2023-06-12 PROCEDURE — A9270 NON-COVERED ITEM OR SERVICE: HCPCS | Performed by: PHYSICAL MEDICINE & REHABILITATION

## 2023-06-12 PROCEDURE — 700102 HCHG RX REV CODE 250 W/ 637 OVERRIDE(OP): Performed by: PHYSICAL MEDICINE & REHABILITATION

## 2023-06-12 PROCEDURE — 99239 HOSP IP/OBS DSCHRG MGMT >30: CPT | Performed by: STUDENT IN AN ORGANIZED HEALTH CARE EDUCATION/TRAINING PROGRAM

## 2023-06-12 PROCEDURE — 700102 HCHG RX REV CODE 250 W/ 637 OVERRIDE(OP): Performed by: STUDENT IN AN ORGANIZED HEALTH CARE EDUCATION/TRAINING PROGRAM

## 2023-06-12 RX ORDER — ENOXAPARIN SODIUM 100 MG/ML
40 INJECTION SUBCUTANEOUS DAILY
Status: DISCONTINUED | OUTPATIENT
Start: 2023-06-12 | End: 2023-06-26 | Stop reason: HOSPADM

## 2023-06-12 RX ORDER — TRAZODONE HYDROCHLORIDE 50 MG/1
50 TABLET ORAL
Status: DISCONTINUED | OUTPATIENT
Start: 2023-06-12 | End: 2023-06-26 | Stop reason: HOSPADM

## 2023-06-12 RX ORDER — AMOXICILLIN 250 MG
2 CAPSULE ORAL 2 TIMES DAILY
Status: DISCONTINUED | OUTPATIENT
Start: 2023-06-12 | End: 2023-06-13

## 2023-06-12 RX ORDER — OXYCODONE HYDROCHLORIDE 5 MG/1
5 TABLET ORAL
Status: DISCONTINUED | OUTPATIENT
Start: 2023-06-12 | End: 2023-06-26 | Stop reason: HOSPADM

## 2023-06-12 RX ORDER — ACETAMINOPHEN 325 MG/1
650 TABLET ORAL EVERY 4 HOURS PRN
Status: DISCONTINUED | OUTPATIENT
Start: 2023-06-13 | End: 2023-06-26 | Stop reason: HOSPADM

## 2023-06-12 RX ORDER — OMEPRAZOLE 20 MG/1
20 CAPSULE, DELAYED RELEASE ORAL DAILY
Status: DISCONTINUED | OUTPATIENT
Start: 2023-06-12 | End: 2023-06-26 | Stop reason: HOSPADM

## 2023-06-12 RX ORDER — POLYETHYLENE GLYCOL 3350 17 G/17G
17 POWDER, FOR SOLUTION ORAL
Refills: 3 | Status: ON HOLD
Start: 2023-06-12 | End: 2023-06-26

## 2023-06-12 RX ORDER — ONDANSETRON 4 MG/1
4 TABLET, ORALLY DISINTEGRATING ORAL 4 TIMES DAILY PRN
Status: DISCONTINUED | OUTPATIENT
Start: 2023-06-12 | End: 2023-06-26 | Stop reason: HOSPADM

## 2023-06-12 RX ORDER — AMOXICILLIN 250 MG
2 CAPSULE ORAL 2 TIMES DAILY
Qty: 30 TABLET | Refills: 0 | Status: ON HOLD
Start: 2023-06-12 | End: 2023-06-26

## 2023-06-12 RX ORDER — ALUMINA, MAGNESIA, AND SIMETHICONE 2400; 2400; 240 MG/30ML; MG/30ML; MG/30ML
20 SUSPENSION ORAL
Status: DISCONTINUED | OUTPATIENT
Start: 2023-06-12 | End: 2023-06-26 | Stop reason: HOSPADM

## 2023-06-12 RX ORDER — ONDANSETRON 2 MG/ML
4 INJECTION INTRAMUSCULAR; INTRAVENOUS 4 TIMES DAILY PRN
Status: DISCONTINUED | OUTPATIENT
Start: 2023-06-12 | End: 2023-06-26 | Stop reason: HOSPADM

## 2023-06-12 RX ORDER — ACETAMINOPHEN 500 MG
1000 TABLET ORAL 3 TIMES DAILY
Qty: 30 TABLET | Refills: 0 | Status: SHIPPED
Start: 2023-06-12

## 2023-06-12 RX ORDER — ENOXAPARIN SODIUM 100 MG/ML
40 INJECTION SUBCUTANEOUS DAILY
Status: ON HOLD | DISCHARGE
Start: 2023-06-12 | End: 2023-06-26

## 2023-06-12 RX ORDER — ACETAMINOPHEN 500 MG
1000 TABLET ORAL 3 TIMES DAILY
Status: COMPLETED | OUTPATIENT
Start: 2023-06-12 | End: 2023-06-12

## 2023-06-12 RX ORDER — ENOXAPARIN SODIUM 100 MG/ML
40 INJECTION SUBCUTANEOUS DAILY
Status: CANCELLED | OUTPATIENT
Start: 2023-06-12

## 2023-06-12 RX ORDER — OXYCODONE HYDROCHLORIDE 5 MG/1
5 TABLET ORAL EVERY 4 HOURS PRN
Status: ON HOLD
Start: 2023-06-12 | End: 2023-06-26

## 2023-06-12 RX ORDER — HYDRALAZINE HYDROCHLORIDE 25 MG/1
25 TABLET, FILM COATED ORAL EVERY 8 HOURS PRN
Status: DISCONTINUED | OUTPATIENT
Start: 2023-06-12 | End: 2023-06-26 | Stop reason: HOSPADM

## 2023-06-12 RX ORDER — POLYETHYLENE GLYCOL 3350 17 G/17G
1 POWDER, FOR SOLUTION ORAL
Status: DISCONTINUED | OUTPATIENT
Start: 2023-06-12 | End: 2023-06-13

## 2023-06-12 RX ORDER — OXYCODONE HYDROCHLORIDE 10 MG/1
10 TABLET ORAL
Status: DISCONTINUED | OUTPATIENT
Start: 2023-06-12 | End: 2023-06-26 | Stop reason: HOSPADM

## 2023-06-12 RX ORDER — ECHINACEA PURPUREA EXTRACT 125 MG
2 TABLET ORAL PRN
Status: DISCONTINUED | OUTPATIENT
Start: 2023-06-12 | End: 2023-06-26 | Stop reason: HOSPADM

## 2023-06-12 RX ORDER — ACETAMINOPHEN 500 MG
1000 TABLET ORAL 3 TIMES DAILY
Status: CANCELLED | OUTPATIENT
Start: 2023-06-12 | End: 2023-06-13

## 2023-06-12 RX ORDER — BISACODYL 10 MG
10 SUPPOSITORY, RECTAL RECTAL
Status: DISCONTINUED | OUTPATIENT
Start: 2023-06-12 | End: 2023-06-13

## 2023-06-12 RX ADMIN — ACETAMINOPHEN 1000 MG: 500 TABLET ORAL at 20:08

## 2023-06-12 RX ADMIN — ACETAMINOPHEN 1000 MG: 500 TABLET, FILM COATED ORAL at 08:46

## 2023-06-12 RX ADMIN — TRAZODONE HYDROCHLORIDE 50 MG: 50 TABLET ORAL at 20:08

## 2023-06-12 RX ADMIN — OXYCODONE 5 MG: 5 TABLET ORAL at 05:20

## 2023-06-12 RX ADMIN — SENNOSIDES AND DOCUSATE SODIUM 2 TABLET: 50; 8.6 TABLET ORAL at 20:09

## 2023-06-12 RX ADMIN — OXYCODONE 5 MG: 5 TABLET ORAL at 08:46

## 2023-06-12 RX ADMIN — SENNOSIDES AND DOCUSATE SODIUM 2 TABLET: 50; 8.6 TABLET ORAL at 05:20

## 2023-06-12 RX ADMIN — OXYCODONE HYDROCHLORIDE 10 MG: 10 TABLET ORAL at 20:08

## 2023-06-12 RX ADMIN — ENOXAPARIN SODIUM 40 MG: 100 INJECTION SUBCUTANEOUS at 17:47

## 2023-06-12 RX ADMIN — OXYCODONE HYDROCHLORIDE 10 MG: 10 TABLET ORAL at 12:55

## 2023-06-12 RX ADMIN — OXYCODONE 5 MG: 5 TABLET ORAL at 02:19

## 2023-06-12 RX ADMIN — OXYCODONE HYDROCHLORIDE 10 MG: 10 TABLET ORAL at 16:03

## 2023-06-12 RX ADMIN — ACETAMINOPHEN 1000 MG: 500 TABLET ORAL at 16:03

## 2023-06-12 RX ADMIN — OMEPRAZOLE 20 MG: 20 CAPSULE, DELAYED RELEASE ORAL at 12:53

## 2023-06-12 ASSESSMENT — PAIN SCALES - PAIN ASSESSMENT IN ADVANCED DEMENTIA (PAINAD)
BODYLANGUAGE: RELAXED
BODYLANGUAGE: RELAXED
FACIALEXPRESSION: SAD, FRIGHTENED, FROWN
FACIALEXPRESSION: SMILING OR INEXPRESSIVE
BREATHING: OCCASIONAL LABORED BREATHING, SHORT PERIOD OF HYPERVENTILATION
BREATHING: NORMAL
TOTALSCORE: 0
TOTALSCORE: 0
BREATHING: NORMAL
BODYLANGUAGE: TENSE, DISTRESSED PACING, FIDGETING
CONSOLABILITY: DISTRACTED OR REASSURED BY VOICE/TOUCH
NEGVOCALIZATION: OCCASIONAL MOAN/GROAN, LOW SPEECH, NEGATIVE/DISAPPROVING QUALITY
FACIALEXPRESSION: SMILING OR INEXPRESSIVE
BODYLANGUAGE: RELAXED
BREATHING: NORMAL
TOTALSCORE: 7
BREATHING: NORMAL
BREATHING: NORMAL
FACIALEXPRESSION: SMILING OR INEXPRESSIVE
BODYLANGUAGE: RIGID, FISTS CLENCHED, KNEES UP, PUSHING/PULLING AWAY, STRIKES OUT
NEGVOCALIZATION: OCCASIONAL MOAN/GROAN, LOW SPEECH, NEGATIVE/DISAPPROVING QUALITY
BREATHING: NORMAL
NEGVOCALIZATION: REPEATED TROUBLED CALLING OUT, LOUD MOANING/GROANING, CRYING
FACIALEXPRESSION: SMILING OR INEXPRESSIVE
TOTALSCORE: 1
FACIALEXPRESSION: FACIAL GRIMACING
BODYLANGUAGE: TENSE, DISTRESSED PACING, FIDGETING
NEGVOCALIZATION: OCCASIONAL MOAN/GROAN, LOW SPEECH, NEGATIVE/DISAPPROVING QUALITY
CONSOLABILITY: NO NEED TO CONSOLE
BODYLANGUAGE: RELAXED
FACIALEXPRESSION: SMILING OR INEXPRESSIVE
FACIALEXPRESSION: SAD, FRIGHTENED, FROWN
FACIALEXPRESSION: FACIAL GRIMACING
TOTALSCORE: 0
BREATHING: NORMAL
BODYLANGUAGE: TENSE, DISTRESSED PACING, FIDGETING
TOTALSCORE: 6
TOTALSCORE: 0
CONSOLABILITY: DISTRACTED OR REASSURED BY VOICE/TOUCH
BODYLANGUAGE: RELAXED
CONSOLABILITY: NO NEED TO CONSOLE
CONSOLABILITY: DISTRACTED OR REASSURED BY VOICE/TOUCH
CONSOLABILITY: NO NEED TO CONSOLE
TOTALSCORE: 4
CONSOLABILITY: NO NEED TO CONSOLE
CONSOLABILITY: DISTRACTED OR REASSURED BY VOICE/TOUCH

## 2023-06-12 ASSESSMENT — LIFESTYLE VARIABLES
EVER FELT BAD OR GUILTY ABOUT YOUR DRINKING: NO
HOW MANY TIMES IN THE PAST YEAR HAVE YOU HAD 5 OR MORE DRINKS IN A DAY: 0
HAVE PEOPLE ANNOYED YOU BY CRITICIZING YOUR DRINKING: NO
CONSUMPTION TOTAL: NEGATIVE
EVER HAD A DRINK FIRST THING IN THE MORNING TO STEADY YOUR NERVES TO GET RID OF A HANGOVER: NO
TOTAL SCORE: 0
ALCOHOL_USE: YES
ON A TYPICAL DAY WHEN YOU DRINK ALCOHOL HOW MANY DRINKS DO YOU HAVE: 1
AVERAGE NUMBER OF DAYS PER WEEK YOU HAVE A DRINK CONTAINING ALCOHOL: 7
HAVE YOU EVER FELT YOU SHOULD CUT DOWN ON YOUR DRINKING: NO
TOTAL SCORE: 0
TOTAL SCORE: 0

## 2023-06-12 ASSESSMENT — FIBROSIS 4 INDEX: FIB4 SCORE: 2.1

## 2023-06-12 ASSESSMENT — PAIN SCALES - WONG BAKER
WONGBAKER_NUMERICALRESPONSE: HURTS JUST A LITTLE BIT
WONGBAKER_NUMERICALRESPONSE: HURTS JUST A LITTLE BIT

## 2023-06-12 ASSESSMENT — PATIENT HEALTH QUESTIONNAIRE - PHQ9
2. FEELING DOWN, DEPRESSED, IRRITABLE, OR HOPELESS: NOT AT ALL
1. LITTLE INTEREST OR PLEASURE IN DOING THINGS: NOT AT ALL
SUM OF ALL RESPONSES TO PHQ9 QUESTIONS 1 AND 2: 0

## 2023-06-12 ASSESSMENT — PAIN DESCRIPTION - PAIN TYPE
TYPE: ACUTE PAIN
TYPE: ACUTE PAIN
TYPE: ACUTE PAIN;SURGICAL PAIN

## 2023-06-12 NOTE — DISCHARGE PLANNING
Case Management Discharge Planning    Admission Date: 6/8/2023  GMLOS: 4  ALOS: 4    6-Clicks ADL Score: 17  6-Clicks Mobility Score: 11  PT and/or OT Eval ordered: Yes  Post-acute Referrals Ordered: Yes  Post-acute Choice Obtained: Yes  Has referral(s) been sent to post-acute provider:  Yes      Anticipated Discharge Dispo: Discharge Disposition: Disch to  rehab facility or distinct part unit (62)  Discharge Address: [facility]    DME Needed: No    Action(s) Taken: Updated Provider/Nurse on Discharge Plan, Choice obtained, and Referral(s) sent    Escalations Completed: None    Medically Clear: Yes    Next Steps: RNCM placed call to S.O. Time and obtained choice for [facility] and referral faxed to [facility] for processing. RNCM notified that [facility] to transport to [facility] 12/1230 today. COBRA completed and provided to bedside nurse. IMM explained to Shawn, Verbal OK and original faxed to [facility] for scanning.     Barriers to Discharge: None    Is the patient up for discharge tomorrow: No today 12/1230 via [facility] w/c.    Care Transition Team Assessment    Information Source  Orientation Level: Oriented to person, Disoriented to time, Oriented to situation, Disoriented to place         Elopement Risk  Legal Hold: No  Ambulatory or Self Mobile in Wheelchair: Yes  Disoriented: Person-At Risk for Elopement, Place-At Risk for Elopement, Time-At Risk for Elopement  Psychiatric Symptoms: Impulsivity-at Risk for Elopement  History of Wandering: No  Elopement this Admit: No  Vocalizing Wanting to Leave: No  Displays Behaviors, Body Language Wanting to Leave: No-Not at Risk for Elopement  Elopement Risk: Not at Risk for Elopement    Interdisciplinary Discharge Planning  Lives with - Patient's Self Care Capacity: Alone and Able to Care For Self, Significant Other  Housing / Facility: 1 Story House (triplex)                   Vision / Hearing Impairment  Right Eye Vision: Impaired  Left Eye Vision: Impaired              Domestic Abuse  Have you ever  been the victim of abuse or violence?: No              Anticipated Discharge Information  Discharge Disposition: Disch to IP rehab facility or distinct part unit (62)  Discharge Address: [facility]

## 2023-06-12 NOTE — CARE PLAN
The patient is Stable - Low risk of patient condition declining or worsening    Pain controlled with current pain regimen. Instructed on plan of care, meds.  Wound care orders in place. Fall risk protocol in place.    Problem: Pain - Standard  Goal: Alleviation of pain or a reduction in pain to the patient’s comfort goal  Outcome: Progressing     Problem: Knowledge Deficit - Standard  Goal: Patient and family/care givers will demonstrate understanding of plan of care, disease process/condition, diagnostic tests and medications  Outcome: Progressing     Problem: Skin Integrity  Goal: Skin integrity is maintained or improved  Outcome: Progressing     Problem: Fall Risk  Goal: Patient will remain free from falls  Outcome: Progressing     Shift Goals  Clinical Goals: Will be free from falls and pain level will be 4 or less.  Patient Goals: Rest/comfort  Family Goals: not present    Progress made toward(s) clinical / shift goals:  progressing    Patient is not progressing towards the following goals:

## 2023-06-12 NOTE — DISCHARGE INSTRUCTIONS
"Hip Hemiarthroplasty  The hip joint is located where the upper end of the femur meets the pelvis socket (acetabulum). The femur, or thigh bone, looks like a long stem with a ball on the end. The acetabulum is a socket or cup-like structure in the pelvis, or hip bone. This \"ball and socket\" allows your hip to move.  During Hip hemiarthroplasty, your surgeon removes the diseased bone tissue and cartilage from the hip joint. The healthy parts of the hip are left intact. The head of the femur (the ball) and the socket (acetabulum) is replaced with new, artificial parts. The new hip is made of materials that allow a normal movement of the joint. This surgery usually lasts 2 to 3 hours.   The purpose of this surgery is to reduce pain and improve range of motion. It is one of the most successful joint replacement surgeries. It most often greatly improves the quality of life.  LET YOUR CAREGIVERS KNOW ABOUT:  Allergies  Medications taken including herbs, eye drops, over the counter medications, and creams  Use of steroids (by mouth or creams)  Previous problems with anesthetics or Novocaine  Possibility of pregnancy, if this applies  History of blood clots (thrombophlebitis)  History of bleeding or blood problems  Previous surgery  Other health problems  BEFORE THE PROCEDURE  Do not eat or drink anything for as long as directed by your caregiver prior to surgery.  You should be present 60 minutes prior to your procedure or as directed.  Prior to surgery an IV (intravenous line for giving fluids) may be started. You may be given an anesthetic (medications and gas to help you sleep) during the procedure.   AFTER THE PROCEDURE  After surgery, you will be taken to the recovery area. There a nurse will watch and check your progress. You may have a catheter (a long, narrow, hollow tube) in your bladder following surgery. The catheter helps you pass your water. Once you're awake, stable, and taking fluids well, barring other " problems you'll be returned to your room. You will receive physical therapy until you are doing well and your caregiver feels it is safe for you to be transferred home or to an extended care facility.  HOME CARE INSTRUCTIONS   You may resume normal diet and activities as directed or allowed.  Change dressings if necessary or as directed.  Only take over-the-counter or prescription medicines for pain, discomfort, or fever as directed by your caregiver.  SEEK IMMEDIATE MEDICAL CARE IF:  You develop:  Swelling of your calf or leg or develop shortness of breath or chest pain.  Redness, swelling, or increasing pain in the wound.  Pus coming from wound.  An unexplained oral temperature over 102° F (38.9° C) develops.  A foul smell coming from the wound or dressing.  A breaking open of the wound (edges not staying together) after sutures or staples have been removed.  MAKE SURE YOU:   Understand these instructions.  Will watch your condition.  Will get help right away if you are not doing well or get worse.  Document Released: 01/02/2008 Document Revised: 03/11/2013 Document Reviewed: 01/29/2008  [employer]® Patient Information ©2014 [employer].

## 2023-06-12 NOTE — PROGRESS NOTES
Patient was transported with [facility]. Tried calling report to GEOVANNY Martinez from [facility] but no response. Left number and extension. D/C paperwork was not given to [facility] because they left before me handing it to them.     1153: GEOVANNY Martinez received report

## 2023-06-12 NOTE — CARE PLAN
The patient is Stable - Low risk of patient condition declining or worsening    Shift Goals  Clinical Goals: Will be free from falls and pain level will be 4 or less.  Patient Goals: Rest/comfort  Family Goals: not present    Progress made toward(s) clinical / shift goals:  Using the call light, Skin intact    Patient is not progressing towards the following goals:      Problem: Fall Risk  Goal: Patient will remain free from falls  Outcome: Progressing  Note: Bed alarm on, checking on patient hourly, bed in lowest position, locked, treaded socks in place      Problem: Skin Integrity  Goal: Skin integrity is maintained or improved  Outcome: Progressing  Note: Repositioning in bed Q2 hours, assessing the skin Qshift

## 2023-06-12 NOTE — CARE PLAN
The patient is Stable - Low risk of patient condition declining or worsening    Shift Goals  Clinical Goals: Safety  Patient Goals: Pain management.      Progress made toward(s) clinical / shift goals:    Problem: Knowledge Deficit - Standard  Goal: Patient and family/care givers will demonstrate understanding of plan of care, disease process/condition, diagnostic tests and medications  Outcome: Progressing   --Pt educated on rehab unit routine, safety, pain management & therapy.  Able to verbalize and demonstrate understanding.      Problem: Pain - Standard  Goal: Alleviation of pain or a reduction in pain to the patient’s comfort goal  Outcome: Progressing     Problem: Fall Risk - Rehab  Goal: Patient will remain free from falls  Outcome: Progressing       Patient is not progressing towards the following goals:None

## 2023-06-12 NOTE — DISCHARGE PLANNING
0944: Per attending patient is medically cleared for IPR. Forwarded PAS to Dr John to review. Plan to dc home with S/O to home with 16 BG, S/O able to provide physical assistance as needed.     1025: Patient has been accepted by Dr John at [facility]. Transport set for 12/1230 [facility] w/c, nurse report #[id number]. Notified care team.

## 2023-06-12 NOTE — PROGRESS NOTES
Patient admitted to facility at 1150 via wheelchair; accompanied by hospital transport.  Patient assisted to room and positioned in bed for comfort and safety; call light within reach.  Patient assisted with stowing belongings and oriented to room and facility.  Admission assessment performed and documented in computer. Patient received and reviewed education binder. Admission paperwork completed; signed copies placed in chart.  Will continue to monitor.

## 2023-06-12 NOTE — PROGRESS NOTES
Received patient resting in bed. No complaints of pain at this time. No PIV as patient pulled and MD aware and can leave out. Bed alarm in place. Dressing to left hip and left upper arm intact. Bed in low position, side rails up x2 and call light within reach.

## 2023-06-12 NOTE — PREADMISSION SCREENING NOTE
Pre-Admission Screening Form    Patient Information:   Name: Chava Bray     MRN: [id number]       : 1935      Age: 87 y.o.   Gender: female      Race: White [7]       Marital Status:  [4]  Family Contact: Shawn Nye        Relationship: Friend [5]  Home Phone: [phone]           Cell Phone:   Advanced Directives: None  Code Status:  FULL  Current Attending Provider: Kellie Mcdermott M.D.  Referring Physician: Dr. Mcdermott      Physiatrist Consult: Dr. Mao       Referral Date: 23  Primary Payor Source:  MEDICARE  Secondary Payor Source:  [employer]    Medical Information:   Date of Admission to Acute Care [facility]20[facility]  Room Number: T325/02  Rehabilitation Diagnosis: 0008.11 - Orthopaedic Disorders: Status Post Unilateral Hip Fracture  Immunization History   Administered Date(s) Administered    MODERNA SARS-COV-2 VACCINE (12+) 2021     Allergies   Allergen Reactions    Sulfa Drugs Unspecified     Unknown childhood reaction      Past Medical History:   Diagnosis Date    Arthritis     Headache(784.0)     Headache, classical migraine     Hyperlipidemia     Osteoporosis, unspecified     Thyroid disease      Past Surgical History:   Procedure Laterality Date    PB PARTIAL HIP REPLACEMENT Left 2023    Procedure: HEMIARTHROPLASTY, HIP;  Surgeon: Sathish Becker M.D.;  Location: SURGERY [facility];  Service: Orthopedics       History Leading to Admission, Conditions that Caused the Need for Rehab (CMS):     Hospital Medicine History & Physical Note     Date of Service  2023     Primary Care Physician  Francia Padilla M.D.     Consultants  orthopedics     Specialist Names: Dr. Becker     Code Status  Full Code     Chief Complaint       Chief Complaint   Patient presents with    Hip Pain       GLF last night with L hip pain. Pt brought to incline last night and transferred here this AM          History of Presenting Illness  Chava Bray is a 87 y.o.  female who presented 6/8/2023 with GLF fall with L hip pain. Patient reports she was tripped and fell a couple days ago and resulting L hip pain, unable to ambulate. Denies fever, chills, chest pain, sob, LOC or head injury.   Here CBC and BMP unremarkable except mild leukocytosis with wbc 10.9, left femur and pelvis xray notes Mild displaced subcapital left femoral neck fracture .  Case was discussed with orthopedics.  Dr. Becker will see the patient.     I discussed the plan of care with patient and ERP Dr. Mendoza  .     Review of Systems  Review of Systems   Musculoskeletal:  Positive for falls and joint pain.   Neurological:  Positive for weakness.   All other systems reviewed and are negative.        Past Medical History   has a past medical history of Arthritis, Headache(784.0), Headache, classical migraine, Hyperlipidemia, Osteoporosis, unspecified, and Thyroid disease.     Surgical History   has no past surgical history on file.      Family History  family history is not on file.   Family history reviewed with patient. There is no family history that is pertinent to the chief complaint.      Social History   reports that she has never smoked. She has never used smokeless tobacco. She reports current alcohol use. She reports that she does not use drugs.     Allergies        Allergies   Allergen Reactions    Sulfa Drugs Unspecified       Unknown childhood reaction          Medications  Prior to Admission Medications   Prescriptions Last Dose Informant Patient Reported? Taking?   VITAMIN D PO ABOUT 1 WEEK at OUT Patient Yes Yes   Sig: Take 1 Tablet by mouth every day.   acetaminophen (TYLENOL) 325 MG Tab FEW DAYS AGO at UNK Patient Yes Yes   Sig: Take 325 mg by mouth every 6 hours as needed. Indications: Pain   ibuprofen (MOTRIN) 200 MG Tab 6/8/2023 at AM Patient Yes Yes   Sig: Take 400 mg by mouth every 6 hours as needed. Indications: Pain      Facility-Administered Medications: None         Physical  Exam  Pulse:  [72-74] 72  BP: (105-111)/(60-64) 111/64  SpO2:  [91 %-97 %] 97 %  Blood Pressure : 111/64       Pulse: 72       Pulse Oximetry: 97 %         Physical Exam  Vitals reviewed.   Constitutional:       Appearance: Normal appearance.   HENT:      Head: Normocephalic and atraumatic.      Nose: Nose normal.      Mouth/Throat:      Pharynx: Oropharynx is clear.   Eyes:      Extraocular Movements: Extraocular movements intact.      Conjunctiva/sclera: Conjunctivae normal.      Pupils: Pupils are equal, round, and reactive to light.   Cardiovascular:      Rate and Rhythm: Normal rate and regular rhythm.      Pulses: Normal pulses.      Heart sounds: Normal heart sounds.   Pulmonary:      Effort: Pulmonary effort is normal.      Breath sounds: Normal breath sounds.   Abdominal:      General: Abdomen is flat. Bowel sounds are normal.      Palpations: Abdomen is soft.   Musculoskeletal:         General: Tenderness (L hip with limited ROM due to pain) present.      Cervical back: Normal range of motion and neck supple.   Skin:     General: Skin is warm and dry.   Neurological:      General: No focal deficit present.      Mental Status: She is alert and oriented to person, place, and time. Mental status is at baseline.   Psychiatric:         Mood and Affect: Mood normal.         Behavior: Behavior normal.            Laboratory:      Recent Labs     06/08/23  0859   WBC 10.9*   RBC 4.99   HEMOGLOBIN 15.0   HEMATOCRIT 46.8   MCV 93.8   MCH 30.1   MCHC 32.1*   RDW 47.8   PLATELETCT 190   MPV 11.2          Recent Labs     06/08/23  0859   SODIUM 141   POTASSIUM 3.8   CHLORIDE 104   CO2 25   GLUCOSE 107*   BUN 20   CREATININE 0.70   CALCIUM 9.3          Recent Labs     06/08/23  0859   ALTSGPT 12   ASTSGOT 15   ALKPHOSPHAT 107*   TBILIRUBIN 1.3   GLUCOSE 107*          Recent Labs     06/08/23  0859   APTT 31.8   INR 1.11      No results for input(s): NTPROBNP in the last 72 hours.      No results for input(s): TROPONINT  in the last 72 hours.     Imaging:  DX-FEMUR-2+ LEFT   Final Result       Mild displaced subcapital left femoral neck fracture       DX-PELVIS-1 OR 2 VIEWS   Final Result       Mildly displaced subcapital left femoral neck fracture       DX-CHEST-PORTABLE (1 VIEW)   Final Result       1.  Cardiomegaly. No overt failure or pneumonia.       OUTSIDE IMAGES-DX CHEST   Final Result       OUTSIDE IMAGES-DX UPPER EXTREMITY, LEFT   Final Result       OUTSIDE IMAGES-DX PELVIS   Final Result       OUTSIDE IMAGES-DX UPPER EXTREMITY, LEFT   Final Result             X-Ray:  I have personally reviewed the images and compared with prior images. No acute intrathoracic abnormalities, no consolidations per my reading.      Assessment/Plan:  Justification for Admission Status  I anticipate this patient will require at least two midnights for appropriate medical management, necessitating inpatient admission because fall, L femur neck fracture requiring orthopedic consultation and surgery, PT/OT     Patient will need a Med/Surg bed on ORTHOPEDICS service .  The need is secondary to left femoral neck fracture.     * Left displaced femoral neck fracture (HCC)  Assessment & Plan  I have reviewed the left femur x-ray, Mild displaced subcapital left femoral neck fracture   Orthopedics was consulted. NPO   Multimodal pain managements including po and iv narcotics prn. Monitoring respiratory status and sedation score  PT/OT post surgery  Fall precuations        Advance care planning  Assessment & Plan  Full Code: I discussed code status with patient . She has medical decision capacity. She wishes to have all life sustaining efforts if needed.   I discussed advance care planning with the patient for 16 minutes, including diagnosis, prognosis, plan of care, risks and benefits of any therapies that could be offered, as well as alternatives including palliation and hospice, as appropriate.           Leukocytosis  Assessment & Plan  Mild, likely  reactive.   Cont monitoring      Fall at home, initial encounter- (present on admission)  Assessment & Plan  Frequent falls  PT/OT  Fall precautions        VTE prophylaxis: SCDs/TEDs     I independently reviewed pertinent clinical lab tests since admission and ordered additional follow up clinical lab tests.  I independently reviewed pertinent radiographic images and the radiologist's reports since admission and ordered additional follow up radiographic studies.  The details of the available patient records in [facility] (including laboratory tests, culture data, medications, imaging, and other pertinent diagnostic tests) and that information was utilized as warranted in today's medical decision making for this patient.  I personally evaluated the patient condition at bedside.     Care interventions include:   Review of interval medical and surgical history, current medications and outpatient medication reconciliation, interval imaging studies and radiologist interpretation, and interval laboratory values.     Aggregated care time spent evaluating, reassessing, reviewing documentation, providing care, and managing this patient exclusive of procedures: 76 minutes           Physical Medicine and Rehabilitation Consultation                                                                                  Date of initial consultation: 6/11/2023  Requesting provider: ordered by Kellie Mcdermott M.D. at 06/11/23 1126  Consulting provider: Christine Mao D.O.  Reason for consultation: assess for acute inpatient rehab appropriateness  LOS: 3 Day(s)     Chief complaint: Left hip pain after ground-level fall     HPI: The patient is a 87 y.o. female with a past medical history of hyperlipidemia, hypothyroidism, history of arthritis, possible baseline dementia;  who presented on 6/8/2023  8:43 AM with left hip pain after ground-level fall.  Per documentation, patient had tripped and fallen on her left side a couple of days prior to  "presenting to the emergency department.  Patient did not hit her head and had no loss of consciousness.  Upon evaluation in the emergency department, images were obtained that showed a left femoral neck fracture.  Orthopedic surgery was consulted, and patient was taken to the OR on 6/9 for open treatment of left displaced femoral neck fracture with hemiarthroplasty performed by Dr. Becker.  Postop, patient has posterior precautions and is WBAT LLE.  Postop, patient's leukocytosis has improved.  Her hemoglobin has remained stable.  She has mild hypotension but has been asymptomatic.  She has been hypoxic, requiring 1 L supplemental O2.  Is not on oxygen at home.     Patient seen and examined at bedside. Reports she feels ok, but wants some lotion of her legs. Reports Left hip pain with movement. Does not report HA, lightheadedness, SOB, CP, abdominal pain, or changes in numbness/tingling/weakness.  Reviewed post acute rehab with patient, she tells me \"it sounds awful\", but would be willing to go to rehab if Shawn thinks its a good idea.         Social Hx:  Patient lives alone, may also be living with a possible boyfriend, in a one-story house with 4 steps inside (per therapy notes)   0 BG, per  boyfriend, has 16 BG  (was previously able to navigate independently)   At prior level of function patient was Independent with mobility and ADLs, was able to navigate stairs independently. Boyfriend Shawn helps with memory issues.      Tobacco: Denies  Alcohol: Denies  Drugs: Denies     THERAPY:  Restrictions: Fall risk, posterior hip precautions, WBAT LLE  PT: Functional mobility   6/10 contact-guard assist with an FW W x20 feet, min assist sit to stand     OT: ADLs  6/10 max assist lower body dressing min assist sit to stand     SLP:   None     IMAGING:  DX-PELVIS-1 OR 2 VIEWS   Final Result       1.  Status post LEFT hip arthroplasty   2.  RIGHT hip osteoarthritis       DX-FEMUR-2+ LEFT   Final Result       Mild displaced " "subcapital left femoral neck fracture       DX-PELVIS-1 OR 2 VIEWS   Final Result       Mildly displaced subcapital left femoral neck fracture       DX-CHEST-PORTABLE (1 VIEW)   Final Result       1.  Cardiomegaly. No overt failure or pneumonia.         PROCEDURES:  6/9 open treatment of left displaced femoral neck fracture with hemiarthroplasty, performed by Dr. Becker     Allergies:        Allergies   Allergen Reactions    Sulfa Drugs Unspecified       Unknown childhood reaction          Physical Exam:  Vitals: /70   Pulse 76   Temp 36.7 °C (98.1 °F) (Temporal)   Resp 15   Ht 1.626 m (5' 4\")   Wt 58.5 kg (129 lb)   SpO2 90%   Gen: NAD, seated comfortably in chair   Head:  NC/AT  Eyes/ Nose/ Mouth: PERRLA, moist mucous membranes  Cardio: RRR, good distal perfusion, warm extremities  Pulm: normal respiratory effort, no cyanosis, on RA    Abd: Soft NTND  Ext: No peripheral edema. No calf tenderness. No clubbing. Left arm bruising, wound dressing in place      Mental status:  A&Ox4 (person, place, date, situation) follows commands, does  not know the month   Speech: fluent, no aphasia or dysarthria     CRANIAL NERVES:  2,3: visual acuity grossly intact, PERRL  3,4,6: EOMI bilaterally, no nystagmus or diplopia  5: sensation intact to light touch bilaterally and symmetric  7: no facial asymmetry  8: hearing grossly intact     Motor:                            Upper Extremity  Myotome R L   Shoulder flexion C5 5/5 5/5   Elbow flexion C5 5/5 5/5   Wrist extension C6 5/5 5/5   Elbow extension C7 5/5 5/5   Finger flexion C8 5/5 5/5   Finger abduction T1 5/5 5/5      Lower Extremity Myotome R L   Hip flexion L2 5/5 3/5   Knee extension L3 5/5 3, limited by pain /5   Ankle dorsiflexion L4 5/5 5/5   Toe extension L5 5/5 5/5   Ankle plantarflexion S1 5/5 5/5         Negative Pronator drift bilaterally     Sensory:   intact to light touch through out     DTRs: 2+ in bilateral  biceps  No clonus at bilateral " ankles  Negative [place] b/l      Tone: no spasticity noted, no cogwheeling noted     Coordination:   intact finger to nose bilaterally  intact fine motor with fingers bilaterally        Labs: Reviewed and significant for        Recent Labs     06/09/23  0123 06/10/23  0409   RBC 4.63 4.36   HEMOGLOBIN 13.9 13.2   HEMATOCRIT 43.6 41.3   PLATELETCT 175 177            Recent Labs     06/09/23  0123 06/10/23  0409 06/11/23  0205   SODIUM 137 138 138   POTASSIUM 3.7 3.8 4.0   CHLORIDE 102 99 102   CO2 22 25 27   GLUCOSE 109* 124* 113*   BUN 23* 33* 25*   CREATININE 0.75 0.98 0.69   CALCIUM 9.0 9.0 8.8      Recent Results         Recent Results (from the past 24 hour(s))   Basic Metabolic Panel     Collection Time: 06/11/23  2:05 AM   Result Value Ref Range     Sodium 138 135 - 145 mmol/L     Potassium 4.0 3.6 - 5.5 mmol/L     Chloride 102 96 - 112 mmol/L     Co2 27 20 - 33 mmol/L     Glucose 113 (H) 65 - 99 mg/dL     Bun 25 (H) 8 - 22 mg/dL     Creatinine 0.69 0.50 - 1.40 mg/dL     Calcium 8.8 8.5 - 10.5 mg/dL     Anion Gap 9.0 7.0 - 16.0   ESTIMATED GFR     Collection Time: 06/11/23  2:05 AM   Result Value Ref Range     GFR (CKD-EPI) 84 >60 mL/min/1.73 m 2               ASSESSMENT:  Patient is a 87 y.o. female admitted with left hip fracture     [facility] Code / Diagnosis to Support: 0008.11 - Orthopaedic Disorders: Status Post Unilateral Hip Fracture     Rehabilitation: Impaired ADLs and mobility  Patient is a good candidate for inpatient rehab based on needs for PT, OT, see dispo details below.      Barriers to transfer include: Insurance authorization, TCCs to verify disposition, medical clearance and bed availability      Additional Recommendations:  Left hip fracture  - Sustained during ground-level fall  - Images revealed left femoral neck fracture  - Orthopedic surgery consulted  - Status post 6/9 open treatment of left femoral neck fracture with hemiarthroplasty performed by Dr. Becker  - Posterior precautions, WBAT  LLE  - Continue with PT/OT, may potentially be impaired with carryover due to dementia, needs training for stair management      Hypoxia  - Is not on oxygen at home  - Is requiring 1 L supplemental O2  - Continue to wean as able, O2 tubing creates risk for trip hazard if planning to discharge home     Hypotension  - Is not impairing mobility  - SBP down to 100/68, asymptomatic  - SABRINA hose ordered         Urinary incontinence  - per SO, has had episodes of urinary incont   - currently using purewick  - recommend timed voids and bladder scan to confirm no over flow incont  - recommend IRF for improvement with incontinence management      Constipation  - No BM since prior to admission  - Continue with scheduled twice daily Senokot  - Changing MiraLAX from as needed to daily     Skin  - left upper arm skin tear   -wound care following, monitor for further breakdown      Dispo:  - patient is currently functioning below their level of baseline, recommend post acute rehab    - recommend IRF level therapy with 3hr of therapy 5 days per week   - piror to acceptance to IRF, will need confirmation of DC support from SO Shawn and constipation resolved   - TCC to assist with insurance auth and DC support            Medical Complexity:  Left hip fracture  Hypoxia  Hypotension  Constipation  Impaired mobility and ADLs        DVT PPX: Lovenox        Thank you for allowing us to participate in the care of this patient.      Patient was seen for 81  minutes on unit/floor of which > 50% of time was spent on counseling and coordination of care regarding the above, including prognosis, risk reduction, benefits of treatment, and options for next stage of care.     Christine Mao D.O.   Physical Medicine and Rehabilitation      Please note that this dictation was created using voice recognition software. I have made every reasonable attempt to correct obvious errors, but there may be errors of grammar and possibly content that I did not  discover before finalizing the note.           DATE OF SERVICE:  06/08/2023      ORTHOPEDIC CONSULTATION     REQUESTING PHYSICIAN:  Duke Mendoza MD, emergency department.     REASON FOR CONSULTATION:  Left femoral neck fracture.     CHIEF COMPLAINT:  Left hip pain.     HISTORY OF PRESENT ILLNESS:  The patient is an 87-year-old.  She had a ground   level fall last night.  She denies having pain in the hip prior to the fall.    I was consulted to provide treatment recommendations and x-rays confirmed   displaced femoral neck fracture.  She is being evaluated for admission to the   hospitalist service.     PAST MEDICAL HISTORY:  ALLERGIES:  SULFA.     PAST MEDICAL DIAGNOSES:  Arthritis, headache, hyperlipidemia, osteoporosis,   thyroid disease.     PAST SURGICAL HISTORY:  None listed.  She denies previous surgical history.     SOCIAL HISTORY:  The patient is a nonsmoker.  She does drink some alcohol.    Denies illicit drug use.     OUTPATIENT MEDICATIONS:  Vitamin D, Tylenol, ibuprofen.     PHYSICAL EXAMINATION:  VITAL SIGNS:  Temperature is not recorded in Epic, heart rate 85, respiratory   rate 14, blood pressure 114/77, pulse oximetry 95% on 2 liters nasal cannula.  GENERAL APPEARANCE:  The patient is alert, pleasant, cooperative, in no acute   distress.  She does seem a little bit confused.  MUSCULOSKELETAL:  Left lower extremity is shortened and externally rotated.    She is able to dorsi and plantarflex the foot, and flex and extend toes.  She   is nontender to palpation at the knee.  There is no knee effusion present.    There is no evidence of obvious traumatic deformity of the right lower or   bilateral upper extremities, which are grossly neurovascularly intact.     DIAGNOSTIC IMAGING:  Plain x-rays of the pelvis, hip and the left femur show a   left displaced midcervical femoral neck fracture.  Plain x-rays of left   shoulder and humerus show no evidence of obvious acute bony abnormality.      ASSESSMENT:  An 87-year-old female with left displaced femoral neck fracture.    She is being evaluated for admission to the hospitalist service.     RECOMMENDATIONS:  1.  I discussed these findings with the patient and I feel that she would   benefit from surgical management for her displaced femoral neck fracture.  We   discussed pros and cons of surgery and I feel she would be a good candidate   for hip hemiarthroplasty.  2.  She can have diet today and be n.p.o. after midnight in anticipation of   surgical management tomorrow and she should be bed rest in the meantime.  She   can have SCDs for DVT prophylaxis as well and heparin throughout the day if   otherwise felt to be clinically appropriate.  3.  She expressed understanding and wished to proceed with surgery when   possible.           ______________________________  MD ALEKSANDRA García/LING     DD:  06/08/2023 14:17  DT:  06/08/2023 17:23     Job#:  [id number]        DATE OF SERVICE:  06/09/2023      PREOPERATIVE DIAGNOSIS:  Left displaced femoral neck fracture.     POSTOPERATIVE DIAGNOSIS:  Left displaced femoral neck fracture.     PROCEDURE PERFORMED:  Open treatment of ____ neck fracture with   hemiarthroplasty.     SURGEON:  Sathish Becker MD     ANESTHESIOLOGIST:  Israel Juarez DO     ANESTHESIA:  General.     ASSISTANT:  Agus Mathews PA-C     ESTIMATED BLOOD LOSS:  200 mL.     IMPLANTS:  Energy COY cemented hip hemiarthroplasty with a size 5 standard   neck angle femoral stem and a +5 mm x 48 mm outer diameter femoral head.     INDICATIONS FOR PROCEDURE:  The patient is 87 years old.  She does have some   baseline underlying dementia.  She had a fall, sustained a left displaced   femoral neck fracture.  She was admitted to the hospitalist service.  I   evaluated her and recommended surgical management with hemiarthroplasty.  She   does not have any other power of  or first-degree family member, but   she does have a  significant other that has been together with her for about 30   years and he was in agreement with having her proceed with surgical   management.  She also wished to proceed with surgical management, but was   clearly somewhat confused, but we plan to proceed due to medical necessity   given her injury.     DESCRIPTION OF PROCEDURE:  The patient was met in the preoperative holding   area.  Her surgical site was signed.  Her consent was confirmed to be   accurate.  She was taken back to the operating room and general anesthesia was   induced.  She was positioned into the right lateral decubitus position with   Stulberg's positioners and an axillary roll.  Left lower extremity was   provisionally cleansed with isopropyl alcohol and then prepped and draped in   the usual sterile fashion.  A formal timeout was performed to confirm   patient's correct name, correct surgical site, correct procedure and correct   laterality.  A posterior approach to the hip was then performed with a scalpel   down through skin.  Dissection was carried with Bovie cautery through the   subcutaneous tissue down to the iliotibial band and the gluteal fascia, which   was split longitudinally in line with the muscle fibers of gluteus hola.  I   identified the plane between piriformis and gluteus minimus and released the   short external rotators and full-thickness sleeve off the proximal femur with   the posterior capsule.  I then resected excess femoral neck with an   oscillating saw and removed the femoral head from the acetabulum.  There was   some mild diffuse wear within the acetabulum consistent with osteoarthritis to   mild degree.  I then with appropriate retractors in place, started preparing   the femur with a box osteotome followed by canal finding reamer followed by   sequential reaming up to a size 6 reamer and then sequentially broached up to   a size 5 reamer, which had good medial lateral fill and rotational stability.    I  trialled and felt that it would be an appropriate stem. Removed the trial   broach and thoroughly irrigated out the acetabulum as well as the femoral   canal, inserted a cement restrictor and dried the femoral canal and mixed   vancomycin-impregnated polymethylmethacrylate cement.  Once it was appropriate   consistency filled and pressurized the canal, I inserted the stem, removing   excess cement during the curing process.  Once cement was sufficiently cured,   I then trialed again and selected ultimately a +5 mm x 48 mm outer diameter   femoral head.  I removed the trial component, cleansed and dried the trunnion,   irrigated out the acetabulum once more of any particulate debris and then   inserted the head, gently impacted and reduced the hip.  The wound was then   soaked in dilute Betadine solution for 3 minutes. The short external rotators   and posterior capsule were repaired through the greater trochanter with #5   Ti-Cron.  I repaired the IT band and gluteal fascia with size 2 Stratafix   suture, subcutaneous layers with 0 Vicryl, 2-0 Vicryl and skin edges with   staples.  Wounds were thoroughly cleansed, dried and sterile   silver-impregnated Mepilex dressing was applied.  She was then awoken from   anesthesia and transferred on the [facility] and taken to postanesthesia care unit   in stable condition.     PLAN:  1.  The patient will be readmitted to the medical service postop.  2.  She can weightbear as tolerated to left lower extremity, but should   maintain posterior hip precautions at all times.  3.  She should work with physical and occupational therapy as soon as possible   for mobilization.  4.  She will need Ancef and vancomycin for 24 hours postop for infection   prophylaxis.  5.  She will be started on heparin or equivalent tomorrow morning if otherwise   clinically appropriate and should continue SCDs for DVT prophylaxis in the   meantime.  6.  Ultimately, she will need to follow up with me 2 weeks  postop for routine   wound check, staple removal and x-rays, 2 views of left hip and AP pelvis.           ______________________________  MD ALEKSANDRA García/JUAN/ASHLIE     DD:  06/09/2023 11:10  DT:  06/09/2023 11:37     Job#:  [id number]        Immediate Post OP Note     PreOp Diagnosis: Left displaced femoral neck fracture        PostOp Diagnosis: same        Procedure(s):  HEMIARTHROPLASTY, HIP - Wound Class: Clean     Surgeon(s):  Sathish Becker M.D.     Anesthesiologist/Type of Anesthesia:  Anesthesiologist: Israel Juarez D.O./General     Surgical Staff:  Circulator: Lukas D. Gansert, R.N.  Scrub Person: Valery Murillo Assist: Agus Mathews P.A.-C.     Specimens removed if any:  * No specimens in log *     Estimated Blood Loss: 200cc     Findings: see dictation     Complications: none known     PLAN:  --readmit postop  --WBAT LLE  --posterior hip precautions  --ancef/vanc x 2 doses postop  --PT/OT for mobilization ASAP  --okay to start heparin or equivalent tomorrow am, continue SCDs  --fu 2 weeks postop           6/9/2023 11:05 AM Sathish Becker M.D.        Co-morbidities:  See PMH  Potential Risk - Complications: Contractures, Deep Vein Thrombosis, Pain, Perceptual Impairment, Pneumonia, and Pressure Ulcer  Level of Risk: High    Ongoing Medical Management Needed (Medical/Nursing Needs):   Patient Active Problem List    Diagnosis Date Noted    Fall at home, initial encounter 06/08/2023    Left displaced femoral neck fracture (HCC) 06/08/2023    Leukocytosis 06/08/2023    Advance care planning 06/08/2023    Lactic acidosis 02/23/2023    E coli bacteremia 02/23/2023    Sepsis due to pneumonia (HCC) 02/23/2023    Prolonged Q-T interval on ECG 02/23/2023    Sepsis (HCC) 02/22/2023    Mild cognitive impairment 02/22/2023    Chronic back pain 02/22/2023    UTI (urinary tract infection) 02/22/2023    Elevated AST (SGOT) 02/22/2023    Thrombocytopenia (HCC) 02/22/2023    Sacroiliitis  "([facility]) 08/21/2018    Spondylosis of cervical region without myelopathy or radiculopathy 03/20/2018    Rib contusion 01/23/2018    Neck pain 12/11/2017    Cervical spinal stenosis 12/11/2017    Lumbar spondylosis 07/14/2015    Lumbar stenosis 02/10/2015     A/o  Current Vital Signs:   Temperature: 36.5 °C (97.7 °F) Pulse: 75 Respiration: 15 Blood Pressure : 100/64  Weight: 58.5 kg (129 lb) Height: 162.6 cm (5' 4\")  Pulse Oximetry: 91 % O2 (LPM): 0      Completed Laboratory Reports:  Recent Labs     06/10/23  0409 06/11/23  0205   WBC 10.2  --    HEMOGLOBIN 13.2  --    HEMATOCRIT 41.3  --    PLATELETCT 177  --    SODIUM 138 138   POTASSIUM 3.8 4.0   BUN 33* 25*   CREATININE 0.98 0.69   GLUCOSE 124* 113*     Additional Labs: Not Applicable    Prior Living Situation:   Housing / Facility: 1 Story House (triplex)  Steps Into Home: 0  Steps In Home: 4 (lives on lower part of home)  Lives with - Patient's Self Care Capacity: Alone and Able to Care For Self, Significant Other  Equipment Owned: Unable to Determine At This Time    Prior Level of Function / Living Situation:   Physical Therapy: Housing / Facility: 1 Story House (triplex)  Steps Into Home: 0  Steps In Home: 4 (lives on lower part of home)  Equipment Owned: Unable to Determine At This Time  Lives with - Patient's Self Care Capacity: Alone and Able to Care For Self, Significant Other  Bed Mobility: Independent  Transfer Status: Independent  Ambulation: Independent  Assistive Devices Used: Unable to Determine At This Time  Stairs: Unable To Determine At This Time  Current Level of Function:   Gait Level Of Assist: Contact Guard Assist  Assistive Device: Front Wheel Walker  Distance (Feet): 20  Deviation: Step To, Antalgic  # of Stairs Climbed: 0  Weight Bearing Status: WBAT  Comments: received up in chair and returned  to chair  Sit to Stand: Minimal Assist  Bed, Chair, Wheelchair Transfer: Minimal Assist  Transfer Method: Stand Step  Sitting in Chair: left up in " chair  Standing: 3 min standing at sink  Occupational Therapy:   Self Feeding: Independent  Grooming / Hygiene: Independent  Bathing: Independent  Dressing: Independent  Toileting: Independent  Housing / Facility: [address] (triplex)  Current Level of Function:   Eating: Modified Independent (set-up at chair level)  Lower Body Dressing: Maximal Assist (to don socks)  Speech Language Pathology:      Rehabilitation Prognosis/Potential: Good  Estimated Length of Stay: 10-14 days    Nursing:      Continent    Scope/Intensity of Services Recommended:  Physical Therapy: 1.5 hr / day  5 days / week. Therapeutic Interventions Required: Maximize Endurance, Mobility, Strength, and Safety  Occupational Therapy: 1.5 hr / day 5 days / week. Therapeutic Interventions Required: Maximize Self Care, ADLs, IADLs, and Energy Conservation  Rehabilitation Nursin/7. Therapeutic Interventions Required: Monitor Pain, Skin, Vital Signs, Intake and Output, Labs, Safety, and Family Training  Rehabilitation Physician: 3 - 5 days / week. Therapeutic Interventions Required: Medical Management    She requires 24-hour rehabilitation nursing to manage bowel and bladder function, skin care, surgical incision, nutrition and fluid intake, pain control, safety, medication management, and patient/family goals. In addition, rehabilitation nursing will reiterate and reinforce therapy skills and equipment use, including ADLs, as well as provide education to the patient and family. Georgiamilly MONTES Katarina is willing to participate in and is able to tolerate the proposed plan of care.    Rehabilitation Goals and Plan (Expected frequency & duration of treatment in the IRF):   Return to the Community, Modified Independent Level of Care, Minimal Assist Level of Care, and Family Able to Provide 24/7 Assistance  Anticipated Date of Rehabilitation Admission: 23  Patient/Family oriented IRF level of care/facility/plan: Yes  Patient/Family willing to  participate in IRF care/facility/plan: Yes  Patient able to tolerate IRF level of care proposed: Yes  Patient has potential to benefit IRF level of care proposed: Yes  Comments: Not Applicable    Special Needs or Precautions - Medical Necessity:  Safety Concerns/Precautions:  Fall Risk / High Risk for Falls and Balance  Pain Management  IV Site: Peripheral  Current Medications:    Current Facility-Administered Medications Ordered in Epic   Medication Dose Route Frequency Provider Last Rate Last Admin    enoxaparin (Lovenox) inj 40 mg  40 mg Subcutaneous DAILY AT 1800 Kellie Mcdermott M.D.   40 mg at 06/11/23 1655    acetaminophen (TYLENOL) tablet 1,000 mg  1,000 mg Oral TID Kellie Mcdermott M.D.   1,000 mg at 06/12/23 0846    [START ON 6/13/2023] acetaminophen (Tylenol) tablet 650 mg  650 mg Oral Q6HRS PRN Kellie Mcdermott M.D.        senna-docusate (PERICOLACE or SENOKOT S) 8.6-50 MG per tablet 2 Tablet  2 Tablet Oral BID Lionel Anne M.D.   2 Tablet at 06/12/23 0520    And    polyethylene glycol/lytes (MIRALAX) PACKET 1 Packet  1 Packet Oral QDAY PRN Lionel Anne M.D.        And    magnesium hydroxide (MILK OF MAGNESIA) suspension 30 mL  30 mL Oral QDAY PRN Lionel Anne M.D.        And    bisacodyl (DULCOLAX) suppository 10 mg  10 mg Rectal QDAY PRN Lionel Anne M.D.        Pharmacy Consult Request ...Pain Management Review 1 Each  1 Each Other PHARMACY TO DOSE Lionel Anne M.D.        oxyCODONE immediate-release (ROXICODONE) tablet 2.5 mg  2.5 mg Oral Q3HRS PRN Lionel Anne M.D.        Or    oxyCODONE immediate-release (ROXICODONE) tablet 5 mg  5 mg Oral Q3HRS PRN Lionel Anne M.D.   5 mg at 06/12/23 0846    Or    morphine 4 MG/ML injection 2 mg  2 mg Intravenous Q3HRS PRN Lionel Anne M.D.   2 mg at 06/08/23 2259    ondansetron (ZOFRAN) syringe/vial injection 4 mg  4 mg Intravenous Q4HRS PRN Lionel Anne M.D.        ondansetron (ZOFRAN ODT) dispertab 4 mg  4 mg Oral Q4HRS PRN Lionel Anne M.D.          No current [facility]-ordered outpatient medications on file.     Diet:   DIET ORDERS (From admission to next 24h)       Start     Ordered    06/09/23 1320  Diet Order Diet: Regular  ALL MEALS        Question:  Diet:  Answer:  Regular    06/09/23 1319                    Anticipated Discharge Destination / Patient/Family Goal:  Destination: Home with Assistance Support System:  S/O  Anticipated home health services: OT, PT, Nursing, Social Work, and Aide  Previously used HH service/ provider: Not Applicable  Anticipated DME Needs: Walker  Outpatient Services: OT and PT  Alternative resources to address additional identified needs:   No future appointments.   Pre-Screen Completed: 6/12/2023 9:30 AM Nereida Porter

## 2023-06-12 NOTE — H&P
"  Physical Medicine & Rehabilitation  History and Physical (H&P)  &     Post Admission Physician Evaluation (IBETH)       Date of Admission: 6/12/2023  Date of Service: 6/12/2023   Chava Bray    [facility] Code to Support Admission: 0008.11 - Orthopaedic Disorders: Status Post Unilateral Hip Fracture  Etiologic Diagnosis: Left displaced femoral neck fracture (HCC)    _______________________________________________    Chief Complaint: Decreased mobility, hip pain    History of Present Illness:  Adapted from the PM&R Consult by Dr. Mao:   The patient is a 87 y.o. female with a past medical history of hyperlipidemia, hypothyroidism, history of arthritis, possible baseline dementia;  who presented on 6/8/2023  8:43 AM with left hip pain after ground-level fall.  Per documentation, patient had tripped and fallen on her left side a couple of days prior to presenting to the emergency department.  Patient did not hit her head and had no loss of consciousness.  Upon evaluation in the emergency department, images were obtained that showed a left femoral neck fracture.  Orthopedic surgery was consulted, and patient was taken to the OR on 6/9 for open treatment of left displaced femoral neck fracture with hemiarthroplasty performed by Dr. Becker.  Postop, patient has posterior precautions and is WBAT LLE.  Postop, patient's leukocytosis has improved.  Her hemoglobin has remained stable.  She has mild hypotension but has been asymptomatic.  She has been hypoxic, requiring 1 L supplemental O2.  Is not on oxygen at home.    Patient tolerated transfer to [facility]. Patient tolerated transfer to [facility]. She is perseverative on pain medications. She is forgetful. Discussed would get pain medication approved ASAP. She reports she's about to \"bite my lip off.\" No SOB. Denies constipation.    Review of Systems:     Comprehensive 14 point ROS was reviewed and all were negative except as noted elsewhere in this document.     Past Medical " History:  Past Medical History:   Diagnosis Date    Arthritis     Headache(784.0)     Headache, classical migraine     Hyperlipidemia     Osteoporosis, unspecified     Thyroid disease        Past Surgical History:  Past Surgical History:   Procedure Laterality Date    PB PARTIAL HIP REPLACEMENT Left 6/9/2023    Procedure: HEMIARTHROPLASTY, HIP;  Surgeon: Sathish Becker M.D.;  Location: SURGERY [facility];  Service: Orthopedics       Family History:  Family History   Problem Relation Age of Onset    Other Neg Hx        Medications:  Current Facility-Administered Medications   Medication Dose    Respiratory Therapy Consult      Pharmacy Consult Request ...Pain Management Review 1 Each  1 Each    hydrALAZINE (APRESOLINE) tablet 25 mg  25 mg    [START ON 6/13/2023] acetaminophen (Tylenol) tablet 650 mg  650 mg    senna-docusate (PERICOLACE or SENOKOT S) 8.6-50 MG per tablet 2 Tablet  2 Tablet    And    polyethylene glycol/lytes (MIRALAX) PACKET 1 Packet  1 Packet    And    magnesium hydroxide (MILK OF MAGNESIA) suspension 30 mL  30 mL    And    bisacodyl (DULCOLAX) suppository 10 mg  10 mg    omeprazole (PRILOSEC) capsule 20 mg  20 mg    mag hydrox-al hydrox-simeth (MAALOX PLUS ES or MYLANTA DS) suspension 20 mL  20 mL    ondansetron (ZOFRAN ODT) dispertab 4 mg  4 mg    Or    ondansetron (ZOFRAN) syringe/vial injection 4 mg  4 mg    traZODone (DESYREL) tablet 50 mg  50 mg    sodium chloride (OCEAN) 0.65 % nasal spray 2 Spray  2 Spray    oxyCODONE immediate-release (ROXICODONE) tablet 5 mg  5 mg    Or    oxyCODONE immediate release (ROXICODONE) tablet 10 mg  10 mg    enoxaparin (Lovenox) inj 40 mg  40 mg    acetaminophen (TYLENOL) tablet 1,000 mg  1,000 mg       Allergies:  Sulfa drugs    Psychosocial History:  Housing / Facility: 1 [address] House (triplex)  Steps Into Home: 0  Steps In Home: 4 (lives on lower part of home)  Lives with - Patient's Self Care Capacity: Alone and Able to Care For Self, Significant  "Other  Equipment Owned: Unable to Determine At This Time     Prior Level of Function / Living Situation:   Physical Therapy: Housing / Facility: [address] [facility] (triplex)  Steps Into Home: 0  Steps In Home: 4 (lives on lower part of home)  Equipment Owned: Unable to Determine At This Time  Lives with - Patient's Self Care Capacity: Alone and Able to Care For Self, Significant Other  Bed Mobility: Independent  Transfer Status: Independent  Ambulation: Independent  Assistive Devices Used: Unable to Determine At This Time  Stairs: Unable To Determine At This Time  Current Level of Function:   Gait Level Of Assist: Contact Guard Assist  Assistive Device: Front Wheel Walker  Distance (Feet): 20  Deviation: Step To, Antalgic  # of Stairs Climbed: 0  Weight Bearing Status: WBAT  Comments: received up in chair and returned  to chair  Sit to Stand: Minimal Assist  Bed, Chair, Wheelchair Transfer: Minimal Assist  Transfer Method: Stand Step  Sitting in Chair: left up in chair  Standing: 3 min standing at sink  Occupational Therapy:   Self Feeding: Independent  Grooming / Hygiene: Independent  Bathing: Independent  Dressing: Independent  Toileting: Independent  Housing / Facility: [facility] (triplex)  Current Level of Function:   Eating: Modified Independent (set-up at chair level)  Lower Body Dressing: Maximal Assist (to don socks)    CURRENT LEVEL OF FUNCTION:   Same as level of function prior to admission to [facility]    Physical Examination:     VITAL SIGNS:   height is 1.6 m (5' 3\") and weight is 52.2 kg (115 lb). Her oral temperature is 36.7 °C (98 °F). Her blood pressure is 114/56 and her pulse is 77. Her respiration is 18 and oxygen saturation is 95%.    GENERAL: No apparent distress  HEENT: Normocephalic/atraumatic, EOMI, and PERRL  CARDIAC: Regular rate and rhythm, normal S1, S2   LUNGS: Clear to auscultation   ABDOMINAL: bowel sounds present, soft, and nontender    EXTREMITIES: no " contractures, spasticity, or edema.  NEURO:  Mental status: answers questions appropriately follows commands  Speech: fluent, no aphasia or dysarthria  Motor:    5/5 BUE  At least 3/5 R HF and KE otherwise 5/5 BLE  Sensory: intact to light touch through out      Radiology:  XR 6/9/23  IMPRESSION:     Mildly displaced subcapital left femoral neck fracture    Laboratory Values:  Recent Labs     06/10/23  0409 06/11/23  0205   SODIUM 138 138   POTASSIUM 3.8 4.0   CHLORIDE 99 102   CO2 25 27   GLUCOSE 124* 113*   BUN 33* 25*   CREATININE 0.98 0.69   CALCIUM 9.0 8.8     Recent Labs     06/10/23  0409   WBC 10.2   RBC 4.36   HEMOGLOBIN 13.2   HEMATOCRIT 41.3   MCV 94.7   MCH 30.3   MCHC 32.0*   RDW 48.7   PLATELETCT 177   MPV 10.8           Primary Rehabilitation Diagnosis:    This patient is a 87 y.o. female admitted for acute inpatient rehabilitation with   Left displaced femoral neck fracture (HCC).    Impairments:   ADLs/IADLs  Mobility    Secondary Diagnosis/Medical Co-morbidities Affecting Function  HTN  Azotemia  Hypothyroidism  Neuropathic pain    Relevant Changes Since Preadmission Evaluation:    Status unchanged    The patient's rehabilitation potential is Good  The patient's medical prognosis is good    Rehabilitation Plan:   Discussion and Recommendations:   1. The patient requires an acute inpatient rehabilitation program with a coordinated program of care at an intensity and frequency not available at a lower level of care. This recommendation is substantiated by the patient's medical physicians who recommend that the patient's intervention and assessment of medical issues needs to be done at an acute level of care for patient's safety and maximum outcome.   2. A coordinated program of care will be supplied by an interdisciplinary team of physical therapy, occupational therapy, rehab physician, rehab nursing, and, if needed, speech therapy and rehab psychology. Rehab team presents a patient-specific  rehabilitation and education program concentrating on prevention of future problems related to accessibility, mobility, skin, bowel, bladder, sexuality, and psychosocial and medical/surgical problems.   3. Need for Rehabilitation Physician: The rehab physician will be evaluating the patient on a multi-weekly basis to help coordinate the program of care. The rehab physician communicates between medical physicians, therapists, and nurses to maximize the patient's potential outcome. Specific areas in which the rehab physician will be providing daily assessment include the following:   A. Assessing the patient's heart rate and blood pressure response (vitals monitoring) to activity and making adjustments in medications or conservative measures as needed.   B. The rehab physician will be assessing the frequency at which the program can be increased to allow the patient to reach optimal functional outcome.   C. The rehab physician will also provide assessments in daily skin care, especially in light of patient's impairments in mobility.   D. The rehab physician will provide special expertise in understanding how to work with functional impairment and recommend appropriate interventions, compensatory techniques, and education that will facilitate the patient's outcome.   4. Rehab R.N.   The rehab RN will be working with patient to carry over in room mobility and activities of daily living when the patient is not in 3 hours of skilled therapy. Rehab nursing will be working in conjunction with rehab physician to address all the medical issues above and continue to assess laboratory work and discuss abnormalities with the treating physicians, assess vitals, and response to activity, and discuss and report abnormalities with the rehab physician. Rehab RN will also continue daily skin care, supervise bladder/bowel program, instruct in medication administration, and ensure patient safety.   5. Rehab Therapy: Therapies to treat  at intensity and frequency of (may change after completion of evaluation by all therapeutic disciplines):       PT:  Physical therapy to address mobility, transfer, gait training and evaluation for adaptive equipment needs 1 and 1/2 hour/day at least 5 days/week for the duration of the ELOS (see below)       OT:  Occupational therapy to address ADLs, self-care, home management training, functional mobility/transfers and assistive device evaluation, and community re-integration 1 and 1/2 hour/day at least 5 days/week for the duration of the ELOS (see below).    Medical management / Rehabilitation Issues/ Adverse Potential as part of rehabilitation plan     Rehabilitation Issues/Adverse Potential  1.  Left hip fracture - Patient with mechanical fall at home with left hip fracture s/p hemiarthroplasty on 6/9/23 with Dr. Becker. Patient is WBAT, posterior precautions. Patient demonstrates functional deficits in strength, balance, coordination, and ADL's. Patient is admitted to [facility] for comprehensive rehabilitation therapy as described below.   Rehabilitation nursing monitors bowel and bladder control, educates on medication administration, co-morbidities and monitors patient safety.    2.  Neurostimulants: None at this time but continue to assess daily for need to initiate should status change.    3.  DVT prophylaxis:  Patient is on Lovenox for anticoagulation upon transfer. Encourage OOB. Monitor daily for signs and symptoms of DVT including but not limited to swelling and pain to prevent the development of DVT that may interfere with therapies.    4.  GI prophylaxis:  On prilosec to prevent gastritis/dyspepsia which may interfere with therapies.    5.  Pain: No issues with pain currently / Controlled with APAP/Oxycodone    6.  Nutrition/Dysphagia: Dietician monitors nutrient intake, recommend supplements prn and provide nutrition education to pt/family to promote optimal nutrition for wound  healing/recovery.     7.  Bladder/bowel:  Start bowel and bladder program as described below, to prevent constipation, urinary retention (which may lead to UTI), and urinary incontinence (which will impact upon pt's functional independence).   - Post void bladder scans, I&O cath for PVRs >400  - up to commode after meal     8.  Skin/dermal ulcer prophylaxis: Monitor for new skin conditions with q.2 h. turns as required to prevent the development of skin breakdown.     9.  Cognition/Behavior: As needed psychologist provides adjustment counseling to illness and psychosocial barriers that may be potential barriers to rehabilitation.     10. Respiratory therapy: RT performs O2 management prn, breathing retraining, pulmonary hygiene and bronchospasm management prn to optimize participation in therapies.     Medical Co-Morbidities/Adverse Potential Affecting Function:  Left hip fracture - Patient with mechanical fall at home with left hip fracture s/p hemiarthroplasty on 6/9/23 with Dr. Becker. Patient is WBAT, posterior precautions.   -PT and OT for mobility and ADLs. Per guidelines, 15 hours per week between PT, OT and/or SLP.  -Follow-up Dr. Becker    HTN - Previously on Lisinopril but low SBP after fall. Will monitor    Azotemia - Worsening after surgery. Check AM CMP    Hypothyroidism - Patient was on Levothyroxine in the past. Currently not on it. Will check TSH    Neuropathic pain - Patient with history of neuropathic pain from spinal stenosis. Previously on Gabapentin    Pain - Patient on Tylenol scheduled and PRN oxycodone    Skin - Patient at risk for skin breakdown due to debility in areas including sacrum, achilles, elbows and head in addition to other sites. Nursing to assess skin daily.     GI Ppx - Patient on Prilosec for GERD prophylaxis. Patient on Senna-docusate for constipation prophylaxis.        DVT Ppx - Patient Lovenox on transfer.    I personally performed a complete drug regimen review and no  potential clinically significant medication issues were identified.     Goals/Expected Level of Function Based on Current Medical and Functional Status:  (may change based on patient's medical status and rate of impairment recovery)  Transfers:   Modified Independent  Mobility/Gait:   Modified Independent  ADL's:   Modified Independent    DISPOSITION: Discharge to pre-morbid independent living setting with the supportive care of patient's family.    ELOS: 10-14 days  ____________________________________    T. French John MD/PhD  [facility] - Physical Medicine & Rehabilitation   [facility] - Brain Injury Medicine   ____________________________________    Pt was seen today for 76 min, and entire time spent in face-to-face contact was >50% in counseling and coordination of care as detailed in A/P above.

## 2023-06-12 NOTE — DISCHARGE PLANNING
Received Choice form at [phone]  Agency/Facility Name: [facility]  Referral sent per Choice form @ [phone]

## 2023-06-12 NOTE — CARE PLAN
"  Problem: Pain - Standard  Goal: Alleviation of pain or a reduction in pain to the patient’s comfort goal  Outcome: Progressing   Patient is able to rate pain on a scale of 1-10.         Problem: Fall Risk - Rehab  Goal: Patient will remain free from falls  Outcome: Progressing   Brianna Brady Fall risk Assessment Score: 27    High fall risk Interventions   - Alarming seatbelt  - Wander guard  - Bed and strip alarm   - Yellow sign by the door   - Yellow wrist band \"Fall risk\"  - Room near to the nurse station  - Do not leave patient unattended in the bathroom  - Fall risk education provided              "

## 2023-06-12 NOTE — DISCHARGE SUMMARY
Discharge Summary    CHIEF COMPLAINT ON ADMISSION  Chief Complaint   Patient presents with    Hip Pain     GLF last night with L hip pain. Pt brought to [facility] last night and transferred here this AM        Reason for Admission  EMS    Admission Date  6/8/2023     CODE STATUS  Full Code    HPI & HOSPITAL COURSE  Chava Bray is a 87 y.o. female with a past medical history of hyperlipidemia, osteoporosis, history of spinal stenosis, thyroid disease and cognitive impairment who presented 6/8/2023 with GLF fall and subsequent L hip pain. Patient reports she was tripped and fell a couple days ago and resulting L hip pain and has been unable to ambulate at home. On evaluation, her CBC and BMP were unremarkable except mild leukocytosis with wbc 10.9, left femur and pelvis xray notes Mild displaced subcapital left femoral neck fracture .  Case was discussed with orthopedics and she was admitted for operative repair. She underwent open reduction and internal fixation and hemiarthroplasty of the left hip. She has done well post operatively. She was evaluated by therapy and post acute care was recommended, thus patient will discharge to acute rehab for ongoing therapy needs.   Patient appears to have early dementia vs cognitive impairment which was consistent with patients signficant others understanding as well. She is forgetful but is calm, pleasant and was previously functioning independently in her ADLs.     Therefore, she is discharged in fair and stable condition to an inpatient rehabilitation hospital.    The patient met 2-midnight criteria for an inpatient stay at the time of discharge.      FOLLOW UP ITEMS POST DISCHARGE  Therapy needs   Post op orthopedic follow up   Chronic medical conditions     DISCHARGE DIAGNOSES  Principal Problem:    Left displaced femoral neck fracture (HCC) (POA: Unknown)  Active Problems:    Mild cognitive impairment (POA: Unknown)    Fall at home, initial encounter (POA: Yes)     Leukocytosis (POA: Unknown)    Advance care planning (POA: Unknown)  Resolved Problems:    * No resolved hospital problems. *      FOLLOW UP  No future appointments.  Sathish Becker M.D.  [address]  [address]  [place] [id number] [phone]    Follow up  Call ASAP to schedule fu appt for 2 weeks postop      MEDICATIONS ON DISCHARGE     Medication List        START taking these medications        Instructions   enoxaparin 40 MG/0.4ML Sosy inj  Commonly known as: Lovenox   Inject 40 mg under the skin every day at 6 PM.  Dose: 40 mg     oxyCODONE immediate-release 5 MG Tabs  Commonly known as: ROXICODONE   Take 1 Tablet by mouth every four hours as needed for Severe Pain for up to 5 days.  Dose: 5 mg     polyethylene glycol/lytes 17 g Pack  Commonly known as: MIRALAX   Take 1 Packet by mouth 1 time a day as needed (if sennosides and docusate ineffective after 24 hours).  Dose: 17 g     senna-docusate 8.6-50 MG Tabs  Commonly known as: PERICOLACE or SENOKOT S   Take 2 Tablets by mouth 2 times a day.  Dose: 2 Tablet            CHANGE how you take these medications        Instructions   acetaminophen 500 MG Tabs  What changed:   medication strength  how much to take  when to take this  reasons to take this  Commonly known as: TYLENOL   Take 2 Tablets by mouth in the morning, at noon, and at bedtime.  Dose: 1,000 mg            CONTINUE taking these medications        Instructions   VITAMIN D PO   Take 1 Tablet by mouth every day.  Dose: 1 Tablet            STOP taking these medications      ibuprofen 200 MG Tabs  Commonly known as: MOTRIN              Allergies  Allergies   Allergen Reactions    Sulfa Drugs Unspecified     Unknown childhood reaction        DIET  Orders Placed This Encounter   Procedures    Diet Order Diet: Regular     Standing Status:   Standing     Number of Occurrences:   1     Order Specific Question:   Diet:     Answer:   Regular [1]       ACTIVITY  As tolerated.  Weight bearing as  tolerated    LINES, DRAINS, AND WOUNDS  This is an automated list. Peripheral IVs will be removed prior to discharge.     External Urinary Catheter (Female Wick) (Active)      Wound 06/09/23 Incision Hip Left (Active)   Site Assessment GUALBERTO 06/12/23 0845   Periwound Assessment GUALBERTO 06/12/23 0845   Margins GUALBERTO 06/12/23 0845   Closure GUALBERTO 06/11/23 1910   Drainage Amount None 06/11/23 0840   Treatments Ice applied 06/11/23 0840   Dressing Options Mepilex Silver 06/12/23 0845   Dressing Changed Observed 06/12/23 0845   Dressing Status Clean;Dry;Intact 06/12/23 0845   Dressing Change/Treatment Frequency Weekly, and As Needed 06/12/23 0845   NEXT Dressing Change/Treatment Date 06/18/23 06/12/23 0845       Wound 06/10/23 Skin Tear Arm Left (Active)   Wound Image   06/11/23 1100   Site Assessment GUALBERTO 06/12/23 0845   Periwound Assessment GUALBERTO 06/12/23 0845   Margins GUALBERTO 06/12/23 0845   Closure GUALBERTO 06/11/23 1910   Drainage Amount Small 06/11/23 1100   Drainage Description Serosanguineous 06/11/23 1100   Treatments Cleansed;Irrigation;Site care 06/11/23 1100   Wound Cleansing Normal Saline Irrigation 06/11/23 1100   Periwound Protectant Skin Protectant Wipes to Periwound 06/11/23 1100   Dressing Cleansing/Solutions Not Applicable 06/11/23 1100   Dressing Options Mepitel One;Hydrofiber Silver;Silicone Adhesive Foam 06/11/23 1100   Dressing Changed Observed 06/11/23 1910   Dressing Status Clean;Dry;Intact 06/11/23 1910   Dressing Change/Treatment Frequency Weekly, and As Needed 06/12/23 0845   NEXT Dressing Change/Treatment Date 06/18/23 06/12/23 0845   NEXT Weekly Photo (Inpatient Only) 06/18/23 06/11/23 1100   Non-staged Wound Description Partial thickness 06/11/23 1100   Wound Length (cm) 9 cm 06/11/23 1100   Wound Width (cm) 6 cm 06/11/23 1100   Wound Depth (cm) 0.1 cm 06/11/23 1100   Wound Surface Area (cm^2) 54 cm^2 06/11/23 1100   Wound Volume (cm^3) 5.4 cm^3 06/11/23 1100   Shape irregular deep skin tear 06/11/23 1100    Wound Odor Mild 06/11/23 1100   Exposed Structures None 06/11/23 1100   WOUND NURSE ONLY - Time Spent with Patient (mins) 15 06/11/23 1100                  MENTAL STATUS ON TRANSFER      Alert and oriented to self, place and situations, needs reorientation on plan of care        CONSULTATIONS  Orthopedic surgery   Physiatry     PROCEDURES  Open treatment of left displaced femoral neck fracture with   hemiarthroplasty.    LABORATORY  Lab Results   Component Value Date    SODIUM 138 06/11/2023    POTASSIUM 4.0 06/11/2023    CHLORIDE 102 06/11/2023    CO2 27 06/11/2023    GLUCOSE 113 (H) 06/11/2023    BUN 25 (H) 06/11/2023    CREATININE 0.69 06/11/2023        Lab Results   Component Value Date    WBC 10.2 06/10/2023    HEMOGLOBIN 13.2 06/10/2023    HEMATOCRIT 41.3 06/10/2023    PLATELETCT 177 06/10/2023        Total time of the discharge process exceeds 35 minutes.

## 2023-06-13 ENCOUNTER — APPOINTMENT (OUTPATIENT)
Dept: OCCUPATIONAL THERAPY | Facility: REHABILITATION | Age: 88
DRG: 560 | End: 2023-06-13
Attending: PHYSICAL MEDICINE & REHABILITATION
Payer: MEDICARE

## 2023-06-13 ENCOUNTER — APPOINTMENT (OUTPATIENT)
Dept: PHYSICAL THERAPY | Facility: REHABILITATION | Age: 88
DRG: 560 | End: 2023-06-13
Attending: PHYSICAL MEDICINE & REHABILITATION
Payer: MEDICARE

## 2023-06-13 ENCOUNTER — APPOINTMENT (OUTPATIENT)
Dept: RADIOLOGY | Facility: REHABILITATION | Age: 88
DRG: 560 | End: 2023-06-13
Attending: PHYSICAL MEDICINE & REHABILITATION
Payer: MEDICARE

## 2023-06-13 LAB
25(OH)D3 SERPL-MCNC: 39 NG/ML (ref 30–100)
ALBUMIN SERPL BCP-MCNC: 3.1 G/DL (ref 3.2–4.9)
ALBUMIN/GLOB SERPL: 1 G/DL
ALP SERPL-CCNC: 118 U/L (ref 30–99)
ALT SERPL-CCNC: 18 U/L (ref 2–50)
ANION GAP SERPL CALC-SCNC: 10 MMOL/L (ref 7–16)
AST SERPL-CCNC: 21 U/L (ref 12–45)
BASOPHILS # BLD AUTO: 1 % (ref 0–1.8)
BASOPHILS # BLD: 0.07 K/UL (ref 0–0.12)
BILIRUB SERPL-MCNC: 0.4 MG/DL (ref 0.1–1.5)
BUN SERPL-MCNC: 15 MG/DL (ref 8–22)
CALCIUM ALBUM COR SERPL-MCNC: 9.7 MG/DL (ref 8.5–10.5)
CALCIUM SERPL-MCNC: 9 MG/DL (ref 8.5–10.5)
CHLORIDE SERPL-SCNC: 101 MMOL/L (ref 96–112)
CO2 SERPL-SCNC: 28 MMOL/L (ref 20–33)
CREAT SERPL-MCNC: 0.67 MG/DL (ref 0.5–1.4)
EOSINOPHIL # BLD AUTO: 0.78 K/UL (ref 0–0.51)
EOSINOPHIL NFR BLD: 10.8 % (ref 0–6.9)
ERYTHROCYTE [DISTWIDTH] IN BLOOD BY AUTOMATED COUNT: 48.6 FL (ref 35.9–50)
EST. AVERAGE GLUCOSE BLD GHB EST-MCNC: 114 MG/DL
GFR SERPLBLD CREATININE-BSD FMLA CKD-EPI: 85 ML/MIN/1.73 M 2
GLOBULIN SER CALC-MCNC: 3.2 G/DL (ref 1.9–3.5)
GLUCOSE SERPL-MCNC: 90 MG/DL (ref 65–99)
HBA1C MFR BLD: 5.6 % (ref 4–5.6)
HCT VFR BLD AUTO: 42.1 % (ref 37–47)
HGB BLD-MCNC: 13.1 G/DL (ref 12–16)
IMM GRANULOCYTES # BLD AUTO: 0.02 K/UL (ref 0–0.11)
IMM GRANULOCYTES NFR BLD AUTO: 0.3 % (ref 0–0.9)
LYMPHOCYTES # BLD AUTO: 2.36 K/UL (ref 1–4.8)
LYMPHOCYTES NFR BLD: 32.8 % (ref 22–41)
MCH RBC QN AUTO: 29.6 PG (ref 27–33)
MCHC RBC AUTO-ENTMCNC: 31.1 G/DL (ref 32.2–35.5)
MCV RBC AUTO: 95 FL (ref 81.4–97.8)
MONOCYTES # BLD AUTO: 0.72 K/UL (ref 0–0.85)
MONOCYTES NFR BLD AUTO: 10 % (ref 0–13.4)
NEUTROPHILS # BLD AUTO: 3.24 K/UL (ref 1.82–7.42)
NEUTROPHILS NFR BLD: 45.1 % (ref 44–72)
NRBC # BLD AUTO: 0 K/UL
NRBC BLD-RTO: 0 /100 WBC (ref 0–0.2)
PLATELET # BLD AUTO: 212 K/UL (ref 164–446)
PMV BLD AUTO: 10.7 FL (ref 9–12.9)
POTASSIUM SERPL-SCNC: 4 MMOL/L (ref 3.6–5.5)
PROT SERPL-MCNC: 6.3 G/DL (ref 6–8.2)
RBC # BLD AUTO: 4.43 M/UL (ref 4.2–5.4)
SODIUM SERPL-SCNC: 139 MMOL/L (ref 135–145)
TSH SERPL DL<=0.005 MIU/L-ACNC: 2.95 UIU/ML (ref 0.38–5.33)
WBC # BLD AUTO: 7.2 K/UL (ref 4.8–10.8)

## 2023-06-13 PROCEDURE — 85025 COMPLETE CBC W/AUTO DIFF WBC: CPT

## 2023-06-13 PROCEDURE — 80053 COMPREHEN METABOLIC PANEL: CPT

## 2023-06-13 PROCEDURE — 82306 VITAMIN D 25 HYDROXY: CPT

## 2023-06-13 PROCEDURE — 84443 ASSAY THYROID STIM HORMONE: CPT

## 2023-06-13 PROCEDURE — 83036 HEMOGLOBIN GLYCOSYLATED A1C: CPT

## 2023-06-13 PROCEDURE — 97112 NEUROMUSCULAR REEDUCATION: CPT

## 2023-06-13 PROCEDURE — 700111 HCHG RX REV CODE 636 W/ 250 OVERRIDE (IP): Performed by: PHYSICAL MEDICINE & REHABILITATION

## 2023-06-13 PROCEDURE — 74018 RADEX ABDOMEN 1 VIEW: CPT

## 2023-06-13 PROCEDURE — 36415 COLL VENOUS BLD VENIPUNCTURE: CPT

## 2023-06-13 PROCEDURE — 99233 SBSQ HOSP IP/OBS HIGH 50: CPT | Performed by: PHYSICAL MEDICINE & REHABILITATION

## 2023-06-13 PROCEDURE — 97535 SELF CARE MNGMENT TRAINING: CPT

## 2023-06-13 PROCEDURE — A9270 NON-COVERED ITEM OR SERVICE: HCPCS | Performed by: PHYSICAL MEDICINE & REHABILITATION

## 2023-06-13 PROCEDURE — 97161 PT EVAL LOW COMPLEX 20 MIN: CPT

## 2023-06-13 PROCEDURE — 97116 GAIT TRAINING THERAPY: CPT

## 2023-06-13 PROCEDURE — 97166 OT EVAL MOD COMPLEX 45 MIN: CPT

## 2023-06-13 PROCEDURE — 700102 HCHG RX REV CODE 250 W/ 637 OVERRIDE(OP): Performed by: PHYSICAL MEDICINE & REHABILITATION

## 2023-06-13 PROCEDURE — 97530 THERAPEUTIC ACTIVITIES: CPT

## 2023-06-13 PROCEDURE — 94760 N-INVAS EAR/PLS OXIMETRY 1: CPT

## 2023-06-13 PROCEDURE — 770010 HCHG ROOM/CARE - REHAB SEMI PRIVAT*

## 2023-06-13 RX ORDER — POLYETHYLENE GLYCOL 3350 17 G/17G
1 POWDER, FOR SOLUTION ORAL DAILY
Status: DISCONTINUED | OUTPATIENT
Start: 2023-06-13 | End: 2023-06-26 | Stop reason: HOSPADM

## 2023-06-13 RX ORDER — AMOXICILLIN 250 MG
2 CAPSULE ORAL 2 TIMES DAILY
Status: DISCONTINUED | OUTPATIENT
Start: 2023-06-13 | End: 2023-06-26 | Stop reason: HOSPADM

## 2023-06-13 RX ORDER — BISACODYL 10 MG
10 SUPPOSITORY, RECTAL RECTAL
Status: DISCONTINUED | OUTPATIENT
Start: 2023-06-13 | End: 2023-06-26 | Stop reason: HOSPADM

## 2023-06-13 RX ADMIN — ENOXAPARIN SODIUM 40 MG: 100 INJECTION SUBCUTANEOUS at 17:36

## 2023-06-13 RX ADMIN — OXYCODONE HYDROCHLORIDE 10 MG: 10 TABLET ORAL at 10:55

## 2023-06-13 RX ADMIN — OXYCODONE HYDROCHLORIDE 10 MG: 10 TABLET ORAL at 05:33

## 2023-06-13 RX ADMIN — OXYCODONE HYDROCHLORIDE 10 MG: 10 TABLET ORAL at 17:38

## 2023-06-13 RX ADMIN — POLYETHYLENE GLYCOL 3350 1 PACKET: 17 POWDER, FOR SOLUTION ORAL at 14:01

## 2023-06-13 RX ADMIN — SENNOSIDES AND DOCUSATE SODIUM 2 TABLET: 50; 8.6 TABLET ORAL at 20:34

## 2023-06-13 RX ADMIN — SENNOSIDES AND DOCUSATE SODIUM 2 TABLET: 50; 8.6 TABLET ORAL at 09:06

## 2023-06-13 RX ADMIN — OMEPRAZOLE 20 MG: 20 CAPSULE, DELAYED RELEASE ORAL at 09:06

## 2023-06-13 RX ADMIN — OXYCODONE HYDROCHLORIDE 10 MG: 10 TABLET ORAL at 14:01

## 2023-06-13 RX ADMIN — TRAZODONE HYDROCHLORIDE 50 MG: 50 TABLET ORAL at 20:55

## 2023-06-13 SDOH — ECONOMIC STABILITY: TRANSPORTATION INSECURITY
IN THE PAST 12 MONTHS, HAS LACK OF RELIABLE TRANSPORTATION KEPT YOU FROM MEDICAL APPOINTMENTS, MEETINGS, WORK OR FROM GETTING THINGS NEEDED FOR DAILY LIVING?: NO

## 2023-06-13 SDOH — ECONOMIC STABILITY: TRANSPORTATION INSECURITY
IN THE PAST 12 MONTHS, HAS THE LACK OF TRANSPORTATION KEPT YOU FROM MEDICAL APPOINTMENTS OR FROM GETTING MEDICATIONS?: NO

## 2023-06-13 ASSESSMENT — PATIENT HEALTH QUESTIONNAIRE - PHQ9
1. LITTLE INTEREST OR PLEASURE IN DOING THINGS: NOT AT ALL
2. FEELING DOWN, DEPRESSED, IRRITABLE, OR HOPELESS: NOT AT ALL
SUM OF ALL RESPONSES TO PHQ9 QUESTIONS 1 AND 2: 0
2. FEELING DOWN, DEPRESSED, IRRITABLE, OR HOPELESS: NOT AT ALL
SUM OF ALL RESPONSES TO PHQ9 QUESTIONS 1 AND 2: 0
1. LITTLE INTEREST OR PLEASURE IN DOING THINGS: NOT AT ALL

## 2023-06-13 ASSESSMENT — PAIN DESCRIPTION - PAIN TYPE
TYPE: ACUTE PAIN

## 2023-06-13 ASSESSMENT — ACTIVITIES OF DAILY LIVING (ADL)
TUB_SHOWER_TRANSFER_DESCRIPTION: GRAB BAR;SHOWER BENCH;INCREASED TIME;INITIAL PREPARATION FOR TASK;SET-UP OF EQUIPMENT;SUPERVISION FOR SAFETY;VERBAL CUEING
BED_CHAIR_WHEELCHAIR_TRANSFER_DESCRIPTION: INCREASED TIME;SUPERVISION FOR SAFETY;VERBAL CUEING
TOILET_TRANSFER_DESCRIPTION: GRAB BAR;INITIAL PREPARATION FOR TASK;INCREASED TIME;SET-UP OF EQUIPMENT;SUPERVISION FOR SAFETY;VERBAL CUEING
TOILETING: INDEPENDENT
TOILET_TRANSFER_DESCRIPTION: GRAB BAR;INCREASED TIME;INITIAL PREPARATION FOR TASK;SUPERVISION FOR SAFETY;VERBAL CUEING
BED_CHAIR_WHEELCHAIR_TRANSFER_DESCRIPTION: INCREASED TIME;SET-UP OF EQUIPMENT;SUPERVISION FOR SAFETY;VERBAL CUEING
TOILETING_LEVEL_OF_ASSIST_DESCRIPTION: ASSIST FOR HYGIENE;ASSIST TO PULL PANTS UP;ASSIST FOR STANDING BALANCE;GRAB BAR;INCREASED TIME;INITIAL PREPARATION FOR TASK;SET-UP OF EQUIPMENT;SUPERVISION FOR SAFETY;VERBAL CUEING

## 2023-06-13 ASSESSMENT — GAIT ASSESSMENTS
GAIT LEVEL OF ASSIST: MINIMAL ASSIST
ASSISTIVE DEVICE: PARALLEL BARS
DISTANCE (FEET): 10
DEVIATION: ANTALGIC;DECREASED BASE OF SUPPORT

## 2023-06-13 ASSESSMENT — BALANCE ASSESSMENTS
PICK UP OBJECT FROM THE FLOOR FROM A STANDING POSITION: 1
STANDING ON ONE LEG: 0
MEDICARE IMPAIRMENT PERCENTAGE: 91
TRANSFERS: 1
REACHING FORWARD WITH OUTSTRETCHED ARM WHILE STANDING: 0
STANDING UNSUPPORTED WITH FEET TOGETHER: 0
LOOK OVER LEFT AND RIGHT SHOULDERS WHILE STANDING: 0
SITTING UNSUPPORTED: 1
LONG VERSION TOTAL SCORE (MAX 56): 5
STANDING UNSUPPORTED WITH EYES CLOSED: 0
TURN 360 DEGREES: 2
STANDING TO SITTING: 0
PLACE ALTERNATE FOOT ON STEP OR STOOL WHILE STANDING UNSUPPORTED: 0
STANDING UNSUPPORTED ONE FOOT IN FRONT: 0
LONG VERSION TOTAL SCORE (MAX 56): 5
STANDING UNSUPPORTED: 0
SITTING TO STANDING: 0

## 2023-06-13 ASSESSMENT — PAIN SCALES - PAIN ASSESSMENT IN ADVANCED DEMENTIA (PAINAD)
BODYLANGUAGE: RELAXED
BODYLANGUAGE: RELAXED
BREATHING: NORMAL
FACIALEXPRESSION: SMILING OR INEXPRESSIVE
TOTALSCORE: 0
BREATHING: NORMAL
BREATHING: NORMAL
BODYLANGUAGE: RELAXED
FACIALEXPRESSION: SMILING OR INEXPRESSIVE
TOTALSCORE: 0
CONSOLABILITY: NO NEED TO CONSOLE
TOTALSCORE: 0
FACIALEXPRESSION: SMILING OR INEXPRESSIVE

## 2023-06-13 ASSESSMENT — BRIEF INTERVIEW FOR MENTAL STATUS (BIMS)
ASKED TO RECALL SOCK: NO, COULD NOT RECALL
ASKED TO RECALL BLUE: NO, COULD NOT RECALL
INITIAL REPETITION OF BED BLUE SOCK - FIRST ATTEMPT: 3
WHAT MONTH IS IT: MISSED BY MORE THAN 1 MONTH
WHAT DAY OF THE WEEK IS IT: INCORRECT
WHAT YEAR IS IT: NO ANSWER
BIMS SUMMARY SCORE: 3
ASKED TO RECALL BED: NO, COULD NOT RECALL

## 2023-06-13 ASSESSMENT — PAIN SCALES - WONG BAKER
WONGBAKER_NUMERICALRESPONSE: DOESN'T HURT AT ALL
WONGBAKER_NUMERICALRESPONSE: HURTS JUST A LITTLE BIT
WONGBAKER_NUMERICALRESPONSE: DOESN'T HURT AT ALL

## 2023-06-13 NOTE — FLOWSHEET NOTE
06/12/23 1904   Protocol Assessment   Initial Assessment Yes   Patient History   Pulmonary Diagnosis None   Procedures Relevant to Respiratory Status None   Home O2 No   Nocturnal CPAP No   Home Treatments/Frequency No   Protocol Pathways   Protocol Pathways None

## 2023-06-13 NOTE — PROGRESS NOTES
Assessment and med pass attempted, pt currently not in room. Pt finishing up with OT. RN will check back.

## 2023-06-13 NOTE — DISCHARGE PLANNING
CASE MANAGEMENT INITIAL ASSESSMENT    Admit Date:  6/12/2023     Case Management has reviewed the medical chart and will meet with patient once isolation is removed to discuss role of case management / discharge planning / team conference.     Patient is a  87 y.o. female transferred from [facility].    Attending physician: Dr. Leyva  PCP: Francia Padilla MD  Orthopedic: Dr. Becker    Diagnosis: Fall at home, initial encounter [W19.XXXA, Y92.009]  S/p left hip fracture [Z87.81]    Co-morbidities:   Patient Active Problem List    Diagnosis Date Noted    S/p left hip fracture 06/12/2023    Fall at home, initial encounter 06/08/2023    Left displaced femoral neck fracture (HCC) 06/08/2023    Leukocytosis 06/08/2023    Advance care planning 06/08/2023    Lactic acidosis 02/23/2023    E coli bacteremia 02/23/2023    Sepsis due to pneumonia (HCC) 02/23/2023    Prolonged Q-T interval on ECG 02/23/2023    Sepsis (HCC) 02/22/2023    Mild cognitive impairment 02/22/2023    Chronic back pain 02/22/2023    UTI (urinary tract infection) 02/22/2023    Elevated AST (SGOT) 02/22/2023    Thrombocytopenia (HCC) 02/22/2023    Sacroiliitis (HCC) 08/21/2018    Spondylosis of cervical region without myelopathy or radiculopathy 03/20/2018    Rib contusion 01/23/2018    Neck pain 12/11/2017    Cervical spinal stenosis 12/11/2017    Lumbar spondylosis 07/14/2015    Lumbar stenosis 02/10/2015     Prior Living Situation:  Housing / Facility: [address]  Lives with - Patient's Self Care Capacity: Significant Other    Prior Level of Function:  Medication Management: Requires Assist  Finances: Requires Assist  Home Management: Requires Assist  Shopping: Requires Assist  Prior Level Of Mobility: Independent Without Device in Community, Independent Without Device in Home  Driving / Transportation: Relatives / Others Provide Transportation    Support Systems:  Primary : Shawn Nye (friend)    Previous Services Utilized:   Equipment  Owned: None  Prior Services: Home-Independent    Other Information:  Occupation (Pre-Hospital Vocational): Retired Due To Age  Primary Payor Source: Medicare A, Medicare B  Primary Care Practitioner : Francia Padilla MD  Other MDs: Dr. Becker (ortho)    Plan:  1. Continue to follow patient through hospitalization and provide discharge planning in collaboration with patient, family, physicians and ancillary services.     2. Utilize community resources to ensure a safe discharge.

## 2023-06-13 NOTE — THERAPY
Occupational Therapy   Initial Evaluation     Patient Name: Chava Bray  Age:  87 y.o., Sex:  female  Medical Record #: [id number]  Today's Date: 6/13/2023     Subjective    Pt resting in bed, agreeable to OT evaluation from 7-8 am. Pt also seen for OT session from 9:30-10.      Objective       06/13/23 0701   OT Charge Group   Charges Yes   OT Self Care / ADL (Units) 3   OT Evaluation OT Evaluation Mod   OT Total Time Spent   OT Individual Total Time Spent (Mins) 90   Prior Living Situation   Prior Services Home-Independent   Housing / Facility 3 [address]  ([place] [address])   Elevator No   Bathroom Set up Walk In Shower   Equipment Owned None   Lives with - Patient's Self Care Capacity Significant Other   Comments pt poor historian, unable to provide information re: home set up, stairs and equipment; needs to be verified w/ family.   Prior Level of ADL Function   Self Feeding Independent   Grooming / Hygiene Independent   Bathing Independent   Dressing Independent   Toileting Independent   Prior Level of IADL Function   Medication Management Requires Assist   Laundry Requires Assist   Kitchen Mobility Independent   Finances Requires Assist   Home Management Requires Assist   Shopping Requires Assist   Prior Level Of Mobility Independent Without Device in Community;Independent Without Device in Home   Driving / Transportation Relatives / Others Provide Transportation   Occupation (Pre-Hospital Vocational) Retired Due To Age   Comments per pt report; needs to be verified w/ family as pt is poor historian   Prior Functioning: Everyday Activities   Self Care Independent   Indoor Mobility (Ambulation) Independent   Stairs Independent   Functional Cognition Needed some help   Prior Device Use None of the given options   Pain   Intervention Distraction;Rest;Repositioned;Nurse Notified   Pain 0 - 10 Group   Location Hip   Location Orientation Left   Pain Rating Scale (NPRS) 5   Description Aching;Sore   Comfort  "Goal Perform Activity   Therapist Pain Assessment Post Activity Pain Same as Prior to Activity   Cognition    Cognition / Consciousness X   Orientation Level Not Oriented to Address;Not Oriented to Year;Not Oriented to Month;Not Oriented to Day;Not Oriented to Time;Not Oriented to Place;Not Oriented to Reason   Level of Consciousness Alert   Ability To Follow Commands 1 Step   Safety Awareness Impaired   New Learning Impaired   Attention Impaired   Sequencing Impaired   Initiation Impaired   Comments hx of dementia, unable to recall post hip prec, significant memory impairment   ABS (Agitated Behavior Scale)   Agitated Behavior Scale Performed No   Cognitive Pattern Assessment   Cognitive Pattern Assessment Used BIMS   Brief Interview for Mental Status (BIMS)   Repetition of Three Words (First Attempt) 3   Temporal Orientation: Year No answer   Temporal Orientation: Month Missed by more than 1 month   Temporal Orientation: Day Incorrect   Recall: \"Sock\" No, could not recall   Recall: \"Blue\" No, could not recall   Recall: \"Bed\" No, could not recall   BIMS Summary Score 3   Confusion Assessment Method (CAM)   Is there evidence of an acute change in mental status from the patient's baseline? Yes   Inattention Behavior present, fluctuates (comes and goes, changes in severity)   Disorganized thinking Behavior present, fluctuates (comes and goes, changes in severity)   Altered level of consciousness Behavior not present   Vision Screen   Vision Not tested  (pt reports no changes/difficulty with vision)   Passive ROM Upper Body   Passive ROM Upper Body WDL   Active ROM Upper Body   Active ROM Upper Body  WDL   Dominant Hand Right   Strength Upper Body   Upper Body Strength  X   Gross Strength Generalized Weakness, Equal Bilaterally.    Sensation Upper Body   Upper Extremity Sensation  Not Tested   Upper Body Muscle Tone   Upper Body Muscle Tone  Not Tested   Balance Assessment   Sitting Balance (Static) Fair -   Sitting " Balance (Dynamic) Fair -   Standing Balance (Static) Poor +   Standing Balance (Dynamic) Poor   Weight Shift Sitting Fair   Weight Shift Standing Poor   Bed Mobility    Supine to Sit Moderate Assist   Sit to Stand Minimal Assist   Scooting Minimal Assist   Coordination Upper Body   Coordination WDL   Comments intact during ADLs   Eating   Assistance Needed Set-up / clean-up;Supervision   Physical Assistance Level No physical assistance   CARE Score - Eating 4   Eating Discharge Goal   Discharge Goal 6   Oral Hygiene   Assistance Needed Set-up / clean-up;Supervision;Verbal cues   Physical Assistance Level No physical assistance   CARE Score - Oral Hygiene 4   Oral Hygiene Discharge Goal   Discharge Goal 6   Shower/Bathe Self   Assistance Needed Physical assistance   Physical Assistance Level 26%-50%   CARE Score - Shower/Bathe Self 3   Shower/Bathe Self Discharge Goal   Discharge Goal 4   Upper Body Dressing   Assistance Needed Physical assistance   Physical Assistance Level 25% or less   CARE Score - Upper Body Dressing 3   Upper Body Dressing Discharge Goal   Discharge Goal 6   Lower Body Dressing   Assistance Needed Physical assistance   Physical Assistance Level 76% or more   CARE Score - Lower Body Dressing 2   Lower Body Dressing Discharge Goal   Discharge Goal 4   Putting On/Taking Off Footwear   Assistance Needed Physical assistance   Physical Assistance Level Total assistance   CARE Score - Putting On/Taking Off Footwear 1   Putting On/Taking Off Footwear Discharge Goal   Discharge Goal 4   Toileting Hygiene   Assistance Needed Physical assistance   Physical Assistance Level 76% or more   CARE Score - Toileting Hygiene 2   Toileting Hygiene Discharge Goal   Discharge Goal 4   Toilet Transfer   Assistance Needed Physical assistance   Physical Assistance Level 26%-50%   CARE Score - Toilet Transfer 3   Toilet Transfer Discharge Goal   Discharge Goal 4   Hearing, Speech, and Vision   Ability to Hear Adequate    Ability to See in Adequate Light Adequate   Expression of Ideas and Wants Some difficulty   Understanding Verbal and Non-Verbal Content Usually understands   Functional Level of Assist   Eating Supervision   Eating Description Supervision for safety;Set-up of equipment or meal/tube feeding   Grooming Standby Assist;Seated   Grooming Description Increased time;Set-up of equipment;Supervision for safety;Verbal cueing  (oral and hair care seated at sink)   Bathing Moderate Assist   Bathing Description Grab bar;Hand held shower;Tub bench;Assit wtih lower extremities;Assit with perineal;Increased time;Set-up of equipment;Supervision for safety;Verbal cueing;Set up for wound protection   Upper Body Dressing Minimal Assist   Upper Body Dressing Description Assist with pulling shirt over head   Lower Body Dressing Maximal Assist   Lower Body Dressing Description Assist with threading into pant leg;Assist with closures;Grab bar;Increased time;Initial preparation for task;Set-up of equipment;Supervision for safety;Verbal cueing   Toileting Maximal Assist   Toileting Description Assist for hygiene;Assist to pull pants up;Assist for standing balance;Grab bar;Increased time;Initial preparation for task;Set-up of equipment;Supervision for safety;Verbal cueing   Bed, Chair, Wheelchair Transfer Moderate Assist   Bed Chair Wheelchair Transfer Description Increased time;Set-up of equipment;Supervision for safety;Verbal cueing  (stand pivot w/ handheld assist)   Toilet Transfers Moderate Assist   Toilet Transfer Description Grab bar;Initial preparation for task;Increased time;Set-up of equipment;Supervision for safety;Verbal cueing  (w/c<>toilet stand pivot via GB, lowering assist)   Tub / Shower Transfers Moderate Assist   Tub Shower Transfer Description Grab bar;Shower bench;Increased time;Initial preparation for task;Set-up of equipment;Supervision for safety;Verbal cueing  (w/c<>shower bench stand pivot via GB, lowering assist)    Comprehension Supervision   Comprehension Description Verbal cues   Expression Supervision   Problem Solving Moderate Assist   Memory Maximal Assist   Memory Description Increased time;Therapy schedule;Verbal cueing   Problem List   Problem List Decreased Active Daily Living Skills;Decreased Homemaking Skills;Decreased Upper Extremity Strength Right;Decreased Upper Extremity Strength Left;Decreased Functional Mobility;Decreased Activity Tolerance;Safety Awareness Deficits / Cognition;Impaired Cognitive Function;Impaired Postural Control / Balance;Limited Knowledge of Post Op Precautions   Precautions   Precautions Fall Risk;Posterior Hip Precautions;Weight Bearing As Tolerated Left Lower Extremity   Comments hx dementia, impaired safety awareness   Current Discharge Plan   Current Discharge Plan Return to Prior Living Situation  (w/ increased assistance from SO)   Benefit    Therapy Benefit Patient Would Benefit from Inpatient Rehab Occupational Therapy to Maximize [place] with ADLs, IADLs and Functional Mobility.   Interdisciplinary Plan of Care Collaboration   IDT Collaboration with  Certified Nursing Assistant;Nursing;Physical Therapist;Physician   Patient Position at End of Therapy Seated;Chair Alarm On;Self Releasing Lap Belt Applied;Call Light within Reach;Tray Table within Reach   Collaboration Comments CLOF, POC, SLP eval   Equipment Needs   Assistive Device / DME Parallel Bars;Front-Wheel Walker;Shower Chair;Grab Bars In Shower / Tub;Grab Bars By Toilet   Adaptive Equipment Reacher;Sock Aide;Dressing Stick;Long Handled Shoe Horn;Leg ;Long Handled Sponge;Elastic Shoe Laces   Strengths & Barriers   Strengths Motivated for self care and independence;Pleasant and cooperative;Supportive family;Willingly participates in therapeutic activities   Barriers Decreased endurance;Fatigue;Generalized weakness;Home accessibility;Impaired activity tolerance;Impaired balance;Impaired carryover of  learning;Impaired insight/denial of deficits;Impaired functional cognition;Limited mobility;Pain;Pain poorly managed     9:30-10: Pt educated on Passport to independence, rehab goals, benefits of therapy, discharge process.  Provided handout listing Posterior hip prec for improved carryover.  Introduced and provided demonstration of AE, including reacher, dressing stick, sock aid, LH shoe horn and LH sponge.     Assessment  Patient is 87 y.o. female with a diagnosis of Left displaced femoral neck fracture.  Pt has posterior precautions and is WBAT LLE. Additional factors influencing patient status / progress (ie: cognitive factors, co-morbidities, social support, etc): per H&P pt has a past medical history of hyperlipidemia, hypothyroidism, history of arthritis, possible baseline dementia.      Pt lives in a multilevel home in [place] with her SO. She reports independent PLOF for ADLs and functional mobility. Per chart review SO was assisting pt with IADLs. Pt reports that she has a walk in shower and owns no equipment. Information re: pt's PLOF, home set up and equipment needs to be verified w/ her SO, as pt is poor historian and has baseline dementia.     During OT evaluation pt required max A to SBA w/ ADLs and min-mod A for functional txfrs from w/c level. She is functioning below her baseline and presents with pain, generalized weakness, impaired functional cog, decreased safety awareness, poor carryover w/ hip prec, impaired balance, decreased activity tolerance. OT services recommended to maximize pt's safety and independence w/ ADLs and functional mobility prior to d/c home w/ family support.     Plan  Recommend Occupational Therapy  minutes per day 5-7 days per week for 10-14 days for the following treatments:  OT Group Therapy, OT Self Care/ADL, OT Cognitive Skill Dev, OT Community Reintegration, OT Manual Ther Technique, OT Neuro Re-Ed/Balance, OT Therapeutic Activity, OT Evaluation, and OT  Therapeutic Exercise.    Passport items to be completed:  Perform bathroom transfers, complete dressing, complete feeding, get ready for the day, prepare a simple meal, participate in household tasks, adapt home for safety needs, demonstrate home exercise program, complete caregiver training     Goals:  Long term and short term goals have been discussed with patient and they are in agreement.    Occupational Therapy Goals (Active)       Problem: Bathing       Dates: Start:  06/13/23         Goal: STG-Within one week, patient will bathe with min A using AE as needed to maintain hip prec.        Dates: Start:  06/13/23               Problem: Dressing       Dates: Start:  06/13/23         Goal: STG-Within one week, patient will dress LB with min A using AE as needed to maintain hip prec.        Dates: Start:  06/13/23               Problem: Functional Transfers       Dates: Start:  06/13/23         Goal: STG-Within one week, patient will transfer to toilet with CGA using LRAD.        Dates: Start:  06/13/23               Problem: OT Long Term Goals       Dates: Start:  06/13/23         Goal: LTG-By discharge, patient will complete basic self care tasks at supv to mod I level using AE as needed to maintain hip prec.        Dates: Start:  06/13/23            Goal: LTG-By discharge, patient will perform bathroom transfers at supv to mod I level using AD/DME as needed.        Dates: Start:  06/13/23               Problem: Toileting       Dates: Start:  06/13/23         Goal: STG-Within one week, patient will complete toileting tasks with min A using AE as needed.        Dates: Start:  06/13/23

## 2023-06-13 NOTE — THERAPY
Physical Therapy   Daily Treatment     Patient Name: Chava Bray  Age:  87 y.o., Sex:  female  Medical Record #: [id number]  Today's Date: 6/13/2023     Precautions  Precautions: Fall Risk, Posterior Hip Precautions, Weight Bearing As Tolerated Left Lower Extremity  Comments: hx dementia, impaired safety awareness    Subjective    Patient is found seated up in chair after lunch, is agreeable to participate.      Objective       06/13/23 1231   PT Charge Group   PT Gait Training (Units) 1   PT Neuromuscular Re-Education / Balance (Units) 1   PT Total Time Spent   PT Individual Total Time Spent (Mins) 30   Outcome Measures   Outcome Measures Utilized 5x STS;Cameron Balance Scale   5x STS (Five Times Sit to Stand Test)   Height of Sitting Surface (inches) 20   5x Sit to STand (seconds) 49.5   Score Definition Fall Risk   Sit to Stand Comments requires BUE support, requires frequent cuing re: multiple step instructions   Cameron Balance Scale   Sitting Unsupported (Score 0-4) 1   Change Of Positon: Sitting To Standing (Score 0-4) 0   Change Of Positon: Standing To Sitting (Score 0-4) 0   Transfers (Score 0-4) 1   Standing Unsupported (Score 0-4) 0   Standing With Eyes Closed (Score 0-4) 0   Standing With Feet Together (Score 0-4) 0   Tandem Standing (Score 0-4) 0   Standing On One Leg (Score 0-4) 0   Turning Trunk (Feet Fixed) (Score 0-4) 0   Retrieving Objects From Floor (Score 0-4) 1   Turning 360 Degrees (Score 0-4) 2   Stool Stepping (Score 0-4) 0   Reaching Forward While Standing (Score 0-4) 0   [place] Balance Total Score (0-56) 5   Interdisciplinary Plan of Care Collaboration   IDT Collaboration with  Occupational Therapist;Speech Therapist;Physician   Patient Position at End of Therapy In Bed;Edge of Bed;Call Light within Reach;Tray Table within Reach;Phone within Reach   Collaboration Comments MD aware patient will benefit from SLP re: cognition/ carryover of post op precautions and for family training re:  "cognition compensation techniques     Timed \"Up and Go\" Test (TUG)  TUG-Standard: Time Walking Aid used?    Timed Trial 1:  248 seconds a walker   Timed Trial 2:  0 seconds Did not attempt second bout   Mean Time 248 seconds HIGH FALL RISK? Yes     Interpretation of Results for TUG Standard:  Ÿ Siomara Aaron, 2000: greater than or equal to 13.5 sec correlated with fall risk in older adults.  Ÿ Janet, 2004: greater than 11.1 sec correlated with increased risk for falls in vestibular patients of all ages.      Assessment    Patient's Cameron and TUG scores are significantly limited by her difficulty with following single step instructions. She would not let go of the parallel bar for attempt to stand unsupported, or any task unsupported. She would verbally agree to let go, then simply not do it. Unable to maintain hand placement for sit <> stand even with max assist and hand over hand assist. Unable to remember sequencing for sit to stand and techniques for comfort with transitions between movements. Unable to recall hip precautions.     Thinks it's December, requires multiple choice options for year, place, situation. Will benefit from SLP consult for family training for cognition compensation techniques and to work on carryover for post op hip precautions and safety awareness.     TUG score is limited by her significant self distracting by reading the label on the walker, requires max assist to navigate walker. Her actual walking was closer to contact guard assist, but performing a 180 degree turn required max assist for walker management.     5x sit to stand is limited by requiring single step instructions for each task \"now stand up\" \"now sit down\" \"now stand up\".     Suspect part of her cognition may be post op anesthesia in addition to baseline of cognition issues. Will follow up with family to determine how far removed she is from normal.      Strengths: Independent prior level of function, Pleasant and " cooperative  Barriers: Constipation, Decreased endurance, Fatigue, Generalized weakness, Impaired activity tolerance, Impaired balance, Impaired carryover of learning, Impaired insight/denial of deficits, Impaired functional cognition, Limited mobility, Pain    Plan    Balance, ambulation with FWW, transfers, strengthening as able    Passport items to be completed:  Get in/out of bed safely, in/out of a vehicle, safely use mobility device, walk or wheel around home/community, navigate up and down stairs, show how to get up/down from the ground, ensure home is accessible, demonstrate HEP, complete caregiver training    Physical Therapy Problems (Active)       Problem: Balance       Dates: Start:  06/13/23         Goal: STG-Within one week, patient will improve standing balance to tolerate static standing x 1 min on firm surface with contact guard assist while performing reaching task       Dates: Start:  06/13/23               Problem: Mobility       Dates: Start:  06/13/23         Goal: STG-Within one week, patient will ambulate 100 ft with FWW and contact guard assist       Dates: Start:  06/13/23            Goal: STG-Within one week, patient will ambulate up/down a 6 inch curb with FWW and min assist       Dates: Start:  06/13/23            Goal: STG-Within one week, patient will ascend and descend four to six stairs with bilateral hand rails and contact guard assist       Dates: Start:  06/13/23               Problem: Mobility Transfers       Dates: Start:  06/13/23         Goal: STG-Within one week, patient will perform bed mobility from flat bed with railing with contact guard assist       Dates: Start:  06/13/23            Goal: STG-Within one week, patient will sit to stand from edge of bed with contact guard assist       Dates: Start:  06/13/23            Goal: STG-Within one week, patient will teach back fall recovery with minimal cuing (limited ability to actually perform due to posterior hip precautions)        Dates: Start:  06/13/23               Problem: PT-Long Term Goals       Dates: Start:  06/13/23         Goal: LTG-By discharge, patient will transfer one surface to another with FWW and supervision assist       Dates: Start:  06/13/23            Goal: LTG-By discharge, patient will perform home exercise program from handouts with set up assist       Dates: Start:  06/13/23            Goal: LTG-By discharge, patient will ambulate up/down flight of stairs with bilateral railing and supervision assist       Dates: Start:  06/13/23            Goal: LTG-By discharge, patient will transfer in/out of a car with supervision assist and FWW       Dates: Start:  06/13/23            Goal: LTG-By discharge, patient will teach back hip precautions 100% with no cuing       Dates: Start:  06/13/23

## 2023-06-13 NOTE — CARE PLAN
"The patient is Stable - Low risk of patient condition declining or worsening    Shift Goals  Clinical Goals: Safety  Patient Goals: Pain management.      Problem: Knowledge Deficit - Standard  Goal: Patient and family/care givers will demonstrate understanding of plan of care, disease process/condition, diagnostic tests and medications  Outcome: Progressing  Note: Received patient confused and trying to get out of bed. Re-orient patient around her room and how to used her call light when in need of help. Call light within reach and Q 2 hour check for safety and fall precaution rendered.      Problem: Pain - Standard  Goal: Alleviation of pain or a reduction in pain to the patient’s comfort goal  Outcome: Progressing  Flowsheets (Taken 6/13/2023 0257)  OB Pain Intervention: Medication - See MAR  Note: Complaint of left hip pain d/t surgical incision and medicated with prn OxyContin with help. [place] dressing in place with no evidence of drainage or bleeding noted. Reminded to use call light when in need of help.      Problem: Fall Risk - Rehab  Goal: Patient will remain free from falls  Outcome: Progressing  Note: Brianna Brady Fall risk Assessment Score: 23    High fall risk Interventions   - Alarming seatbelt  - Bed and strip alarm   - Yellow sign by the door   - Yellow wrist band \"Fall risk\"  - Do not leave patient unattended in the bathroom  - Fall risk education provided         "

## 2023-06-13 NOTE — PROGRESS NOTES
Pt a&o 2, VSS, assessment completed. Resting comfortably in wheelchair with call light, bedside table in reach. No c/o at this time. Side rails up 2. Instructed to use call light when needing to get OOB verbalized understanding, RN will reinforce. Bed alarm and chair alarm on, bed in low position. Will continue to monitor.

## 2023-06-13 NOTE — THERAPY
Physical Therapy   Initial Evaluation     Patient Name: Chava Bray  Age:  87 y.o., Sex:  female  Medical Record #: [id number]  Today's Date: 6/13/2023     Subjective    Patient is agreeable to participate in therapy, requests to use the bathroom first     Objective       06/13/23 1031   PT Charge Group   PT Therapeutic Activities (Units) 1   PT Evaluation PT Evaluation Low   PT Total Time Spent   PT Individual Total Time Spent (Mins) 60   Prior Living Situation   Prior Services Home-Independent   Housing / Facility 3 Story House   Steps Into Home   (per hospital PT notes, patient is able to stay on one floor. Patient is poor historian, unable to discern multiple levels or not.)   Bathroom Set up   (unable to reliably determine, patient is poor historian)   Equipment Owned None  (denies using a walker at baseline)   Lives with - Patient's Self Care Capacity Significant Other   Prior Level of Functional Mobility   Bed Mobility Independent   Transfer Status Independent   Ambulation Independent   Distance Ambulation (Feet)   (suspect limited community ambulator, given background of dementia and suspected history of falls)   Stairs Independent   Prior Functioning: Everyday Activities   Self Care Independent   Indoor Mobility (Ambulation) Independent   Stairs Independent   Functional Cognition Needed some help   Prior Device Use None of the given options   Strength Lower Body   Lower Body Strength  X   Gross Strength Generalized Weakness, Equal Bilaterally   Comments LLE more weak due to pain. Fear of falling limits functional assessment and manual muscle tests   Lower Body Muscle Tone   Comments generally cachectic throughout   Balance Assessment   Sitting Balance (Static) Fair -   Sitting Balance (Dynamic) Fair -   Standing Balance (Static) Poor +   Standing Balance (Dynamic) Poor   Weight Shift Sitting Fair   Weight Shift Standing Poor   Bed Mobility    Sit to Stand Minimal Assist   Scooting Minimal Assist    Coordination Lower Body    Coordination Lower Body  WDL   Comments no significant ataxia noted,   Roll Left and Right   Assistance Needed Physical assistance   Physical Assistance Level 26%-50%   CARE Score - Roll Left and Right 3   Roll Left and Right Discharge Goal   Discharge Goal 6   Sit to Lying   Assistance Needed Physical assistance   Physical Assistance Level 26%-50%   CARE Score - Sit to Lying 3   Sit to Lying Discharge Goal   Discharge Goal 6   Lying to Sitting on Side of Bed   Assistance Needed Physical assistance   Physical Assistance Level 26%-50%   CARE Score - Lying to Sitting on Side of Bed 3   Lying to Sitting on Side of Bed Discharge Goal   Discharge Goal 6   Sit to Stand   Assistance Needed Physical assistance   Physical Assistance Level 26%-50%   CARE Score - Sit to Stand 3   Sit to Stand Discharge Goal   Discharge Goal 6   Chair/Bed-to-Chair Transfer   Assistance Needed Physical assistance   Physical Assistance Level 26%-50%   CARE Score - Chair/Bed-to-Chair Transfer 3   Chair/Bed-to-Chair Transfer Discharge Goal   Discharge Goal 6   Car Transfer   Reason if not Attempted Environmental limitations   CARE Score - Car Transfer 10   Car Transfer Discharge Goal   Discharge Goal 6   Walk 10 Feet   Assistance Needed Physical assistance   Physical Assistance Level 26%-50%   CARE Score - Walk 10 Feet 3   Walk 10 Feet Discharge Goal   Discharge Goal 4   Walk 50 Feet with Two Turns   Reason if not Attempted Safety concerns   CARE Score - Walk 50 Feet with Two Turns 88   Walk 50 Feet with Two Turns Discharge Goal   Discharge Goal 4   Walk 150 Feet   Reason if not Attempted Safety concerns   CARE Score - Walk 150 Feet 88   Walk 150 Feet Discharge Goal   Discharge Goal 4   Walking 10 Feet on Uneven Surfaces   Reason if not Attempted Safety concerns   CARE Score - Walking 10 Feet on Uneven Surfaces 88   Walking 10 Feet on Uneven Surfaces Discharge Goal   Discharge Goal 4   1 Step (Curb)   Reason if not  Attempted Safety concerns   CARE Score - 1 Step (Curb) 88   1 Step (Curb) Discharge Goal   Discharge Goal 4   4 Steps   Reason if not Attempted Safety concerns   CARE Score - 4 Steps 88   4 Steps Discharge Goal   Discharge Goal 4   12 Steps   Reason if not Attempted Safety concerns   CARE Score - 12 Steps 88   12 Steps Discharge Goal   Discharge Goal 4   Picking Up Object   Reason if not Attempted Safety concerns   CARE Score - Picking Up Object 88   Picking Up Object Discharge Goal   Discharge Goal 4   Wheel 50 Feet with Two Turns   Reason if not Attempted Activity not applicable   CARE Score - Wheel 50 Feet with Two Turns 9   Wheel 50 Feet with Two Turns Discharge Goal   Discharge Goal 9   Wheel 150 Feet   Reason if not Attempted Activity not applicable   CARE Score - Wheel 150 Feet 9   Wheel 150 Feet Discharge Goal   Discharge Goal 9   Gait Functional Level of Assist    Gait Level Of Assist Minimal Assist   Assistive Device Parallel Bars   Distance (Feet) 10   # of Times Distance was Traveled 2   Deviation Antalgic;Decreased Base Of Support   Transfer Functional Level of Assist   Bed, Chair, Wheelchair Transfer Moderate Assist   Bed Chair Wheelchair Transfer Description Increased time;Supervision for safety;Verbal cueing   Toilet Transfers Moderate Assist   Toilet Transfer Description Grab bar;Increased time;Initial preparation for task;Supervision for safety;Verbal cueing   Problem List    Problems Pain;Impaired Bed Mobility;Impaired Transfers;Impaired Ambulation;Functional ROM Deficit;Functional Strength Deficit;Impaired Balance;Decreased Activity Tolerance;Safety Awareness Deficits / Cognition;Limited Knowledge of Post-Op Precautions   Precautions   Precautions Fall Risk;Posterior Hip Precautions;Weight Bearing As Tolerated Left Lower Extremity   Comments hx dementia, impaired safety awareness   Current Discharge Plan   Current Discharge Plan Return to Prior Living Situation  (with increased assist from SO)    Interdisciplinary Plan of Care Collaboration   IDT Collaboration with  Occupational Therapist;Physician   Patient Position at End of Therapy Seated;Chair Alarm On;Self Releasing Lap Belt Applied;Call Light within Reach;Tray Table within Reach;Phone within Reach   Collaboration Comments coord c OT re: POC, CLOF. MD aware patient may benefit from SLP for cognition   Benefit   Therapy Benefit Patient Would Benefit from Inpatient Rehabilitation Physical Therapy to Maximize Functional [place] with ADLs, IADLs and Mobility.   Strengths & Barriers   Strengths Independent prior level of function;Pleasant and cooperative   Barriers Constipation;Decreased endurance;Fatigue;Generalized weakness;Impaired activity tolerance;Impaired balance;Impaired carryover of learning;Impaired insight/denial of deficits;Impaired functional cognition;Limited mobility;Pain     Addendum: Patient spent significant time on toilet during PT eval, we were able to observe transfers, sequencing, orientation, recall/ lack of recall re: safety precautions, and safety awareness. Spent limited time on functional tasks due to amount of time in bathroom.     Assessment  Patient is 87 y.o. female with a diagnosis of L hip hemiarthroplasty s/p GLF.  Additional factors influencing patient status / progress (ie: cognitive factors, co-morbidities, social support, etc): cognition, supportive significant other, suspected history of multiple falls.      Plan  Recommend Physical Therapy 30-60 minutes per day 5-7 days per week for 10-14 days for the following treatments:  PT Gait Training, PT Self Care/Home Eval, PT Therapeutic Exercises, PT Neuro Re-Ed/Balance, PT Therapeutic Activity, and PT Evaluation.    Cameron, TUG, 5x sit to stand    Passport items to be completed:  Get in/out of bed safely, in/out of a vehicle, safely use mobility device, walk or wheel around home/community, navigate up and down stairs, show how to get up/down from the ground, ensure home  is accessible, demonstrate HEP, complete caregiver training    Goals:  Long term and short term goals have been discussed with patient and they are in agreement.    Physical Therapy Problems (Active)       Problem: Balance       Dates: Start:  06/13/23         Goal: STG-Within one week, patient will improve standing balance to tolerate static standing x 1 min on firm surface with contact guard assist while performing reaching task       Dates: Start:  06/13/23               Problem: Mobility       Dates: Start:  06/13/23         Goal: STG-Within one week, patient will ambulate 100 ft with FWW and contact guard assist       Dates: Start:  06/13/23            Goal: STG-Within one week, patient will ambulate up/down a 6 inch curb with FWW and min assist       Dates: Start:  06/13/23            Goal: STG-Within one week, patient will ascend and descend four to six stairs with bilateral hand rails and contact guard assist       Dates: Start:  06/13/23               Problem: Mobility Transfers       Dates: Start:  06/13/23         Goal: STG-Within one week, patient will perform bed mobility from flat bed with railing with contact guard assist       Dates: Start:  06/13/23            Goal: STG-Within one week, patient will sit to stand from edge of bed with contact guard assist       Dates: Start:  06/13/23            Goal: STG-Within one week, patient will teach back fall recovery with minimal cuing (limited ability to actually perform due to posterior hip precautions)       Dates: Start:  06/13/23               Problem: PT-Long Term Goals       Dates: Start:  06/13/23         Goal: LTG-By discharge, patient will transfer one surface to another with FWW and supervision assist       Dates: Start:  06/13/23            Goal: LTG-By discharge, patient will perform home exercise program from handouts with set up assist       Dates: Start:  06/13/23            Goal: LTG-By discharge, patient will ambulate up/down flight of  stairs with bilateral railing and supervision assist       Dates: Start:  06/13/23            Goal: LTG-By discharge, patient will transfer in/out of a car with supervision assist and FWW       Dates: Start:  06/13/23            Goal: LTG-By discharge, patient will teach back hip precautions 100% with no cuing       Dates: Start:  06/13/23

## 2023-06-13 NOTE — PROGRESS NOTES
"  Physical Medicine & Rehabilitation Progress Note    Encounter Date: 6/13/2023    Chief Complaint: Decreased mobility, constipation    Interval Events (Subjective):  Patient sitting up in room. She missed some of her therapy due to severe constipation. Will add on Miralax in addition to stool softeners. Reviewed AM labs and stable labs.     Objective:  VITAL SIGNS: /66   Pulse 69   Temp 36.8 °C (98.2 °F) (Oral)   Resp 20   Ht 1.6 m (5' 3\")   Wt 52.2 kg (115 lb)   SpO2 93%   BMI 20.37 kg/m²   Gen: NAD  Psych: Mood and affect appropriate  CV: RRR, 0 edema  Resp: CTAB, no upper airway sounds  Abd: NT mild distention  Neuro: AOx2, following commands    Laboratory Values:  Recent Results (from the past 72 hour(s))   Basic Metabolic Panel    Collection Time: 06/11/23  2:05 AM   Result Value Ref Range    Sodium 138 135 - 145 mmol/L    Potassium 4.0 3.6 - 5.5 mmol/L    Chloride 102 96 - 112 mmol/L    Co2 27 20 - 33 mmol/L    Glucose 113 (H) 65 - 99 mg/dL    Bun 25 (H) 8 - 22 mg/dL    Creatinine 0.69 0.50 - 1.40 mg/dL    Calcium 8.8 8.5 - 10.5 mg/dL    Anion Gap 9.0 7.0 - 16.0   ESTIMATED GFR    Collection Time: 06/11/23  2:05 AM   Result Value Ref Range    GFR (CKD-EPI) 84 >60 mL/min/1.73 m 2   CoV-2, Flu A/B, And RSV by PCR ([employer])    Collection Time: 06/12/23  1:00 PM    Specimen: Respirate   Result Value Ref Range    Influenza virus A RNA Negative Negative    Influenza virus B, PCR Negative Negative    RSV, PCR Negative Negative    SARS-CoV-2 by PCR NotDetected     SARS-CoV-2 Source NP Swab    CBC with Differential    Collection Time: 06/13/23  6:03 AM   Result Value Ref Range    WBC 7.2 4.8 - 10.8 K/uL    RBC 4.43 4.20 - 5.40 M/uL    Hemoglobin 13.1 12.0 - 16.0 g/dL    Hematocrit 42.1 37.0 - 47.0 %    MCV 95.0 81.4 - 97.8 fL    MCH 29.6 27.0 - 33.0 pg    MCHC 31.1 (L) 32.2 - 35.5 g/dL    RDW 48.6 35.9 - 50.0 fL    Platelet Count 212 164 - 446 K/uL    MPV 10.7 9.0 - 12.9 fL    Neutrophils-Polys 45.10 " 44.00 - 72.00 %    Lymphocytes 32.80 22.00 - 41.00 %    Monocytes 10.00 0.00 - 13.40 %    Eosinophils 10.80 (H) 0.00 - 6.90 %    Basophils 1.00 0.00 - 1.80 %    Immature Granulocytes 0.30 0.00 - 0.90 %    Nucleated RBC 0.00 0.00 - 0.20 /100 WBC    Neutrophils (Absolute) 3.24 1.82 - 7.42 K/uL    Lymphs (Absolute) 2.36 1.00 - 4.80 K/uL    Monos (Absolute) 0.72 0.00 - 0.85 K/uL    Eos (Absolute) 0.78 (H) 0.00 - 0.51 K/uL    Baso (Absolute) 0.07 0.00 - 0.12 K/uL    Immature Granulocytes (abs) 0.02 0.00 - 0.11 K/uL    NRBC (Absolute) 0.00 K/uL   Comp Metabolic Panel (CMP)    Collection Time: 06/13/23  6:03 AM   Result Value Ref Range    Sodium 139 135 - 145 mmol/L    Potassium 4.0 3.6 - 5.5 mmol/L    Chloride 101 96 - 112 mmol/L    Co2 28 20 - 33 mmol/L    Anion Gap 10.0 7.0 - 16.0    Glucose 90 65 - 99 mg/dL    Bun 15 8 - 22 mg/dL    Creatinine 0.67 0.50 - 1.40 mg/dL    Calcium 9.0 8.5 - 10.5 mg/dL    AST(SGOT) 21 12 - 45 U/L    ALT(SGPT) 18 2 - 50 U/L    Alkaline Phosphatase 118 (H) 30 - 99 U/L    Total Bilirubin 0.4 0.1 - 1.5 mg/dL    Albumin 3.1 (L) 3.2 - 4.9 g/dL    Total Protein 6.3 6.0 - 8.2 g/dL    Globulin 3.2 1.9 - 3.5 g/dL    A-G Ratio 1.0 g/dL   HEMOGLOBIN A1C    Collection Time: 06/13/23  6:03 AM   Result Value Ref Range    Glycohemoglobin 5.6 4.0 - 5.6 %    Est Avg Glucose 114 mg/dL   TSH with Reflex to FT4    Collection Time: 06/13/23  6:03 AM   Result Value Ref Range    TSH 2.950 0.380 - 5.330 uIU/mL   Vitamin D, 25-hydroxy (blood)    Collection Time: 06/13/23  6:03 AM   Result Value Ref Range    25-Hydroxy   Vitamin D 25 39 30 - 100 ng/mL   CORRECTED CALCIUM    Collection Time: 06/13/23  6:03 AM   Result Value Ref Range    Correct Calcium 9.7 8.5 - 10.5 mg/dL   ESTIMATED GFR    Collection Time: 06/13/23  6:03 AM   Result Value Ref Range    GFR (CKD-EPI) 85 >60 mL/min/1.73 m 2       Medications:  Scheduled Medications   Medication Dose Frequency    Pharmacy Consult Request  1 Each PHARMACY TO DOSE     senna-docusate  2 Tablet BID    omeprazole  20 mg DAILY    enoxaparin (LOVENOX) injection  40 mg DAILY AT 1800     PRN medications: Respiratory Therapy Consult, hydrALAZINE, acetaminophen, senna-docusate **AND** polyethylene glycol/lytes **AND** magnesium hydroxide **AND** bisacodyl, mag hydrox-al hydrox-simeth, ondansetron **OR** ondansetron, traZODone, sodium chloride, oxyCODONE immediate-release **OR** oxyCODONE immediate-release    Diet:  Current Diet Order   Procedures    Diet Order Diet: Regular       Medical Decision Making and Plan:  Left hip fracture - Patient with mechanical fall at home with left hip fracture s/p hemiarthroplasty on 6/9/23 with Dr. Becker. Patient is WBAT, posterior precautions.   -PT and OT for mobility and ADLs. Per guidelines, 15 hours per week between PT, OT and/or SLP.  -Follow-up Dr. Becker     HTN - Previously on Lisinopril but low SBP after fall. Will monitor. Into 110s, will monitor     Azotemia - Worsening after surgery. Check AM CMP - improved to 15     Hypothyroidism - Patient was on Levothyroxine in the past. Currently not on it. Will check TSH wnl      Neuropathic pain - Patient with history of neuropathic pain from spinal stenosis. Previously on Gabapentin     Pain - Patient on Tylenol scheduled and PRN oxycodone     Dementia - Does not appear to be on any medications. Will consult SLP and consider start medication    Skin - Patient at risk for skin breakdown due to debility in areas including sacrum, achilles, elbows and head in addition to other sites. Nursing to assess skin daily.      GI Ppx - Patient on Prilosec for GERD prophylaxis. Patient on Senna-docusate for constipation prophylaxis.   -Severe constipation, mild distention, give Miralax. KUB ordered. May need bowel clean out     DVT Ppx - Patient Lovenox on transfer.  ____________________________________    T. French John MD/PhD  [facility] - Physical Medicine & Rehabilitation   [facility] - Brain Injury Medicine    ____________________________________    Total time:  50 minutes. Time spent included pre-rounding review of vitals and tests, unit/floor time, face-to-face time with the patient including physical examination, care coordination, counseling of patient and/or family, ordering medications/procedures/tests, discussion with CM, PT, OT, SLP and/or other healthcare providers, and documentation in the electronic medical record. Topics discussed included improved azotemia, dementia, SLP consult, constipation, check KUB and schedule Miralax.

## 2023-06-13 NOTE — CARE PLAN
The patient is Stable - Low risk of patient condition declining or worsening    Shift Goals  Clinical Goals: pt remains clinically stable and free from injury, PT/OT, pain control, reorientation  Patient Goals: Rest  Family Goals: GUALBERTO    Progress made toward(s) clinical / shift goals:  Rest    Problem: Knowledge Deficit - Standard  Goal: Patient and family/care givers will demonstrate understanding of plan of care, disease process/condition, diagnostic tests and medications  Outcome: Progressing  Note: POC: pt remains clinically stable and free from injury, PT/OT, pain control, reorientation. RN will reinforce POC with the pt as pt is very forgetful and did not remember this RN from this AM introduction.      Problem: Pain - Standard  Goal: Alleviation of pain or a reduction in pain to the patient’s comfort goal  Outcome: Progressing  Note: PT denies pain at this time, RN will continue to assess.        Patient is not progressing towards the following goals:

## 2023-06-14 ENCOUNTER — APPOINTMENT (OUTPATIENT)
Dept: PHYSICAL THERAPY | Facility: REHABILITATION | Age: 88
DRG: 560 | End: 2023-06-14
Attending: PHYSICAL MEDICINE & REHABILITATION
Payer: MEDICARE

## 2023-06-14 ENCOUNTER — APPOINTMENT (OUTPATIENT)
Dept: OCCUPATIONAL THERAPY | Facility: REHABILITATION | Age: 88
DRG: 560 | End: 2023-06-14
Attending: PHYSICAL MEDICINE & REHABILITATION
Payer: MEDICARE

## 2023-06-14 LAB
APPEARANCE UR: ABNORMAL
BACTERIA #/AREA URNS HPF: ABNORMAL /HPF
BILIRUB UR QL STRIP.AUTO: NEGATIVE
COLOR UR: YELLOW
EPI CELLS #/AREA URNS HPF: NEGATIVE /HPF
GLUCOSE UR STRIP.AUTO-MCNC: NEGATIVE MG/DL
HYALINE CASTS #/AREA URNS LPF: ABNORMAL /LPF
KETONES UR STRIP.AUTO-MCNC: NEGATIVE MG/DL
LEUKOCYTE ESTERASE UR QL STRIP.AUTO: ABNORMAL
MICRO URNS: ABNORMAL
NITRITE UR QL STRIP.AUTO: NEGATIVE
PH UR STRIP.AUTO: 6.5 [PH] (ref 5–8)
PROT UR QL STRIP: 30 MG/DL
RBC # URNS HPF: ABNORMAL /HPF
RBC UR QL AUTO: ABNORMAL
SP GR UR STRIP.AUTO: 1.01
UROBILINOGEN UR STRIP.AUTO-MCNC: 1 MG/DL
WBC #/AREA URNS HPF: ABNORMAL /HPF

## 2023-06-14 PROCEDURE — 87186 SC STD MICRODIL/AGAR DIL: CPT

## 2023-06-14 PROCEDURE — A9270 NON-COVERED ITEM OR SERVICE: HCPCS | Performed by: PHYSICAL MEDICINE & REHABILITATION

## 2023-06-14 PROCEDURE — 94760 N-INVAS EAR/PLS OXIMETRY 1: CPT

## 2023-06-14 PROCEDURE — 700102 HCHG RX REV CODE 250 W/ 637 OVERRIDE(OP): Performed by: PHYSICAL MEDICINE & REHABILITATION

## 2023-06-14 PROCEDURE — 700111 HCHG RX REV CODE 636 W/ 250 OVERRIDE (IP): Performed by: PHYSICAL MEDICINE & REHABILITATION

## 2023-06-14 PROCEDURE — 87077 CULTURE AEROBIC IDENTIFY: CPT

## 2023-06-14 PROCEDURE — 770010 HCHG ROOM/CARE - REHAB SEMI PRIVAT*

## 2023-06-14 PROCEDURE — 99232 SBSQ HOSP IP/OBS MODERATE 35: CPT | Performed by: PHYSICAL MEDICINE & REHABILITATION

## 2023-06-14 PROCEDURE — 81001 URINALYSIS AUTO W/SCOPE: CPT

## 2023-06-14 PROCEDURE — 87086 URINE CULTURE/COLONY COUNT: CPT

## 2023-06-14 PROCEDURE — 97116 GAIT TRAINING THERAPY: CPT

## 2023-06-14 PROCEDURE — 97110 THERAPEUTIC EXERCISES: CPT

## 2023-06-14 PROCEDURE — 97530 THERAPEUTIC ACTIVITIES: CPT

## 2023-06-14 PROCEDURE — 97535 SELF CARE MNGMENT TRAINING: CPT

## 2023-06-14 RX ORDER — CEFDINIR 300 MG/1
300 CAPSULE ORAL EVERY 12 HOURS
Status: DISCONTINUED | OUTPATIENT
Start: 2023-06-14 | End: 2023-06-14

## 2023-06-14 RX ORDER — NITROFURANTOIN 25; 75 MG/1; MG/1
100 CAPSULE ORAL 2 TIMES DAILY WITH MEALS
Status: COMPLETED | OUTPATIENT
Start: 2023-06-14 | End: 2023-06-19

## 2023-06-14 RX ADMIN — SENNOSIDES AND DOCUSATE SODIUM 2 TABLET: 50; 8.6 TABLET ORAL at 20:54

## 2023-06-14 RX ADMIN — OXYCODONE 5 MG: 5 TABLET ORAL at 07:58

## 2023-06-14 RX ADMIN — TRAZODONE HYDROCHLORIDE 50 MG: 50 TABLET ORAL at 20:55

## 2023-06-14 RX ADMIN — MAGNESIUM HYDROXIDE 30 ML: 1200 LIQUID ORAL at 17:39

## 2023-06-14 RX ADMIN — SENNOSIDES AND DOCUSATE SODIUM 2 TABLET: 50; 8.6 TABLET ORAL at 07:57

## 2023-06-14 RX ADMIN — ENOXAPARIN SODIUM 40 MG: 100 INJECTION SUBCUTANEOUS at 17:39

## 2023-06-14 RX ADMIN — ACETAMINOPHEN 650 MG: 325 TABLET ORAL at 07:58

## 2023-06-14 RX ADMIN — OXYCODONE 5 MG: 5 TABLET ORAL at 15:36

## 2023-06-14 RX ADMIN — CEFDINIR 300 MG: 300 CAPSULE ORAL at 12:21

## 2023-06-14 RX ADMIN — OMEPRAZOLE 20 MG: 20 CAPSULE, DELAYED RELEASE ORAL at 07:57

## 2023-06-14 RX ADMIN — NITROFURANTOIN MONOHYDRATE/MACROCRYSTALLINE 100 MG: 25; 75 CAPSULE ORAL at 17:39

## 2023-06-14 RX ADMIN — POLYETHYLENE GLYCOL 3350 1 PACKET: 17 POWDER, FOR SOLUTION ORAL at 08:00

## 2023-06-14 RX ADMIN — ACETAMINOPHEN 650 MG: 325 TABLET ORAL at 15:36

## 2023-06-14 ASSESSMENT — GAIT ASSESSMENTS
DEVIATION: ANTALGIC;DECREASED BASE OF SUPPORT
ASSISTIVE DEVICE: FRONT WHEEL WALKER
GAIT LEVEL OF ASSIST: MINIMAL ASSIST
DISTANCE (FEET): 25

## 2023-06-14 ASSESSMENT — ACTIVITIES OF DAILY LIVING (ADL)
TOILET_TRANSFER_DESCRIPTION: GRAB BAR;INCREASED TIME;INITIAL PREPARATION FOR TASK;SET-UP OF EQUIPMENT;SUPERVISION FOR SAFETY;VERBAL CUEING
BED_CHAIR_WHEELCHAIR_TRANSFER_DESCRIPTION: ADAPTIVE EQUIPMENT;INCREASED TIME;SET-UP OF EQUIPMENT;SUPERVISION FOR SAFETY;VERBAL CUEING
TOILET_TRANSFER_DESCRIPTION: GRAB BAR;INCREASED TIME;INITIAL PREPARATION FOR TASK;SUPERVISION FOR SAFETY;VERBAL CUEING
BED_CHAIR_WHEELCHAIR_TRANSFER_DESCRIPTION: INCREASED TIME;INITIAL PREPARATION FOR TASK;SUPERVISION FOR SAFETY;VERBAL CUEING

## 2023-06-14 ASSESSMENT — PATIENT HEALTH QUESTIONNAIRE - PHQ9
SUM OF ALL RESPONSES TO PHQ9 QUESTIONS 1 AND 2: 0
2. FEELING DOWN, DEPRESSED, IRRITABLE, OR HOPELESS: NOT AT ALL
1. LITTLE INTEREST OR PLEASURE IN DOING THINGS: NOT AT ALL

## 2023-06-14 ASSESSMENT — PAIN DESCRIPTION - PAIN TYPE
TYPE: ACUTE PAIN

## 2023-06-14 ASSESSMENT — PAIN SCALES - PAIN ASSESSMENT IN ADVANCED DEMENTIA (PAINAD)
BREATHING: NORMAL
FACIALEXPRESSION: SMILING OR INEXPRESSIVE
BODYLANGUAGE: RELAXED
TOTALSCORE: 0
CONSOLABILITY: NO NEED TO CONSOLE

## 2023-06-14 ASSESSMENT — PAIN SCALES - WONG BAKER: WONGBAKER_NUMERICALRESPONSE: HURTS JUST A LITTLE BIT

## 2023-06-14 NOTE — PROGRESS NOTES
Notified Dr. John @0897 that patient ua came back positive and she has been having low BP, symptomatic , c/o dizziness with ambulation and at rest.

## 2023-06-14 NOTE — CARE PLAN
The patient is Watcher - Medium risk of patient condition declining or worsening    Shift Goals  Clinical Goals: patient will remain free from falls through end of shift  Patient Goals: feel better  Family Goals: not present    Progress made toward(s) clinical / shift goals:  Monitoring BP closely. Oxy for pain. All fall precautions in place.     Patient is not progressing towards the following goals:

## 2023-06-14 NOTE — FLOWSHEET NOTE
06/13/23 1845   Events/Summary/Plan   Events/Summary/Plan Room air SpO2 check, noted desaturations   Vital Signs   Pulse (!) 50   Respiration 20   Pulse Oximetry (!) 86 %   $ Pulse Oximetry (Spot Check) Yes   Oxygen   O2 Delivery Device None - Room Air

## 2023-06-14 NOTE — CARE PLAN
"The patient is Stable - Low risk of patient condition declining or worsening    Shift Goals  Clinical Goals: safety  Patient Goals: safety, rest, Vitals  Family Goals: GUALBERTO      Problem: Pain - Standard  Goal: Alleviation of pain or a reduction in pain to the patient’s comfort goal  Outcome: Progressing  Note: Patient able to verbalize needs.  Denies pain or discomfort this shift and no s/s same noted.  Will continue to monitor.      Problem: Fall Risk - Rehab  Goal: Patient will remain free from falls  Outcome: Progressing  Note: Brianna Brady Fall risk Assessment Score: 20    High fall risk Interventions   - Alarming seatbelt  - Bed and strip alarm   - Yellow sign by the door   - Yellow wrist band \"Fall risk\"  - Room near to the nurse station  - Do not leave patient unattended in the bathroom  - Fall risk education provided             Problem: Skin Integrity  Goal: Skin integrity is maintained or improved  Outcome: Progressing  Note: Continue to monitor surgical incision and skin to arm for any s/s of infections.      "

## 2023-06-14 NOTE — FLOWSHEET NOTE
06/13/23 1847   Events/Summary/Plan   Events/Summary/Plan Oxygen set up per protocol   Vital Signs   Pulse 74   Respiration 20   Pulse Oximetry 95 %   $ Pulse Oximetry (Spot Check) Yes   Respiratory Assessment   Respiratory Pattern Within Normal Limits   Level of Consciousness Responds to voice   Chest Exam   Work Of Breathing / Effort Within Normal Limits   Oxygen   O2 (LPM) 2   O2 Delivery Device Nasal Cannula

## 2023-06-14 NOTE — THERAPY
"Occupational Therapy  Daily Treatment     Patient Name: Chava Bray  Age:  87 y.o., Sex:  female  Medical Record #: [id number]  Today's Date: 6/14/2023     Precautions  Precautions: Fall Risk, Posterior Hip Precautions, Weight Bearing As Tolerated Left Lower Extremity  Comments: hx dementia, impaired safety awareness         Subjective    \"Help me get dressed, I'm going home. Shawn is coming to pick me up. I don't understand why I'm at this hotel\"     Objective       06/14/23 1401   OT Charge Group   OT Self Care / ADL (Units) 4   OT Total Time Spent   OT Individual Total Time Spent (Mins) 60   Precautions   Precautions Fall Risk;Posterior Hip Precautions;Weight Bearing As Tolerated Left Lower Extremity   Comments hx dementia, impaired safety awareness   Cognition    Level of Consciousness Alert   Ability To Follow Commands 1 Step   Safety Awareness Impaired   New Learning Impaired   Attention Impaired   Sequencing Impaired   Initiation Impaired   ABS (Agitated Behavior Scale)   Agitated Behavior Scale Performed Yes   Short Attention Span, Easy Distractibility, Inability to Concentrate 3   Impulsive, Impatient, Low Tolerance for Pain or Frustration 2   Uncooperative, Resistant to Care, Demanding 2   Violent and/or Threatening Violence Toward People or Property 1   Explosive and/or Unpredictable Anger 1   Rocking, Rubbing, Moaning, Other Self-Stimulating Behavior 2   Pulling at Tubes, Restraints, etc. 1   Wandering from Treatment Area 1   Restlessness, Pacing, Excessive Movement 2   Repetitive Behaviors, Motor and/or Verbal 2   Rapid, Loud or Excessive Talking 1   Sudden Changes of Mood 1   Easily Initiated - Excessive Crying and/or Laughter 1   Self-Abusiveness, Physical and/or Verbal 1   Agitated Behavior Scale Total Score 21   Level of Severity No Agitation   Functional Level of Assist   Grooming Supervision;Seated   Upper Body Dressing Minimal Assist   Upper Body Dressing Description Increased time;Set-up of " equipment;Supervision for safety  (don [place] shirt w/ assist to wrap shirt around back; pt able to thread UEs and manage buttons)   Lower Body Dressing Maximal Assist   Lower Body Dressing Description Increased time;Initial preparation for task;Set-up of equipment;Supervision for safety;Verbal cueing  (poor carryover w/ AE, max A to don brief, pants and tread socks)   Toileting Maximal Assist   Toileting Description Assist for hygiene;Assist to pull pants up;Assist for standing balance;Grab bar;Increased time;Initial preparation for task;Set-up of equipment;Verbal cueing;Supervision for safety   Bed, Chair, Wheelchair Transfer Moderate Assist   Bed Chair Wheelchair Transfer Description Assist with two limbs;Increased time;Initial preparation for task;Set-up of equipment;Supervision for safety;Verbal cueing  (assist to maintain hip prec)   Toilet Transfers Minimal Assist   Toilet Transfer Description Grab bar;Increased time;Initial preparation for task;Set-up of equipment;Supervision for safety;Verbal cueing  (w/c<>toilet SPT via GB)   Bed Mobility    Sit to Supine Moderate Assist   Scooting Minimal Assist   Interdisciplinary Plan of Care Collaboration   IDT Collaboration with  Certified Nursing Assistant;Nursing   Patient Position at End of Therapy In Bed;Bed Alarm On;Call Light within Reach;Tray Table within Reach   Collaboration Comments pt had BM         Assessment    Pt demo poor compliance with posterior hip prec, and poor carryover w/ use of AE for LB dressing. She con't to require max cues for safety and sequencing. Pt con't  to require min-mod A for safety with functional transfers.   Strengths: Motivated for self care and independence, Pleasant and cooperative, Supportive family, Willingly participates in therapeutic activities  Barriers: Decreased endurance, Fatigue, Generalized weakness, Home accessibility, Impaired activity tolerance, Impaired balance, Impaired carryover of learning, Impaired  insight/denial of deficits, Impaired functional cognition, Limited mobility, Pain, Pain poorly managed    Plan    ADLs w/ AE education as able, functional mobility/transfers, strength/endurance, balance, functional cog.  Contact SO re: bathroom set up/equipment needs.       Occupational Therapy Goals (Active)       Problem: Bathing       Dates: Start:  06/13/23         Goal: STG-Within one week, patient will bathe with min A using AE as needed to maintain hip prec.        Dates: Start:  06/13/23               Problem: Dressing       Dates: Start:  06/13/23         Goal: STG-Within one week, patient will dress LB with min A using AE as needed to maintain hip prec.        Dates: Start:  06/13/23               Problem: Functional Transfers       Dates: Start:  06/13/23         Goal: STG-Within one week, patient will transfer to toilet with CGA using LRAD.        Dates: Start:  06/13/23               Problem: OT Long Term Goals       Dates: Start:  06/13/23         Goal: LTG-By discharge, patient will complete basic self care tasks at supv to mod I level using AE as needed to maintain hip prec.        Dates: Start:  06/13/23            Goal: LTG-By discharge, patient will perform bathroom transfers at supv to mod I level using AD/DME as needed.        Dates: Start:  06/13/23               Problem: Toileting       Dates: Start:  06/13/23         Goal: STG-Within one week, patient will complete toileting tasks with min A using AE as needed.        Dates: Start:  06/13/23

## 2023-06-14 NOTE — THERAPY
"Physical Therapy   Daily Treatment     Patient Name: Chava Bray  Age:  87 y.o., Sex:  female  Medical Record #: [id number]  Today's Date: 6/14/2023     Precautions  Precautions: Fall Risk, Posterior Hip Precautions, Weight Bearing As Tolerated Left Lower Extremity  Comments: hx dementia, impaired safety awareness    Subjective    Patient is found in bed with breakfast essentially untouched, has only eaten one small bite of her pancake. When asked why she's still in bed, she reports \"my hip hurts, I want my old one back, they never even asked me about it\".      Objective       06/14/23 0831   PT Charge Group   PT Gait Training (Units) 1   PT Therapeutic Exercise (Units) 2   PT Therapeutic Activities (Units) 1   PT Total Time Spent   PT Individual Total Time Spent (Mins) 60   Gait Functional Level of Assist    Gait Level Of Assist Minimal Assist   Assistive Device Front Wheel Walker   Distance (Feet) 25   # of Times Distance was Traveled 1   Deviation Antalgic;Decreased Base Of Support   Transfer Functional Level of Assist   Bed, Chair, Wheelchair Transfer Moderate Assist   Bed Chair Wheelchair Transfer Description Increased time;Initial preparation for task;Supervision for safety;Verbal cueing   Toilet Transfers Moderate Assist   Toilet Transfer Description Grab bar;Increased time;Initial preparation for task;Supervision for safety;Verbal cueing   Sitting Lower Body Exercises   Sit to Stand   (approx 5 sit to stands, patient was hypotensive with reports of dizziness (79/57))   Nustep Resistance Level 1  (5 min, 100 steps. Max assist c verbal/tactile cues to keep going, mod/max assist to maintain neutral hip (passively drops to IR))   Standing Lower Body Exercises   Heel Rise 1 set of 10;Bilateral   Bed Mobility    Supine to Sit Moderate Assist  (requires cues for hip precautions, demonstrates little carryover and poor initiation)   Sit to Stand Minimal Assist   Interdisciplinary Plan of Care Collaboration "   IDT Collaboration with  Physical Therapist   Patient Position at End of Therapy Seated;Chair Alarm On;Self Releasing Lap Belt Applied;Call Light within Reach;Tray Table within Reach;Phone within Reach   Collaboration Comments POC, CLOF         Assessment    Hypotension in standing limits weight bearing activity and participation with out of bed mobility. She has poor initiation, requires frequent cues to keep going on NuStep and to participate. RN reported hypotension this AM, and had adjusted pain meds accordingly. Due to patient's poor initiation and altered cognition, she may be better served by enforcing out of bed to cafeteria for meals to prevent pressure sores, further constipation, risk for aspiration pneumonia/ DVT/PE, and to improve initiation by being around other people who are also eating.     She has no recall of hip precautions, and requires constant supervision during mobility related tasks to prevent breaking them as she passively rests in internal rotation and adduction. She is a high risk for dislocation due to cognition.     Strengths: Independent prior level of function, Pleasant and cooperative  Barriers: Constipation, Decreased endurance, Fatigue, Generalized weakness, Impaired activity tolerance, Impaired balance, Impaired carryover of learning, Impaired insight/denial of deficits, Impaired functional cognition, Limited mobility, Pain    Plan    Gait with FWW, balance/ strength training as able, bed mobility, hip precaution recall    Passport items to be completed:  Get in/out of bed safely, in/out of a vehicle, safely use mobility device, walk or wheel around home/community, navigate up and down stairs, show how to get up/down from the ground, ensure home is accessible, demonstrate HEP, complete caregiver training    Physical Therapy Problems (Active)       Problem: Balance       Dates: Start:  06/13/23         Goal: STG-Within one week, patient will improve standing balance to tolerate  static standing x 1 min on firm surface with contact guard assist while performing reaching task       Dates: Start:  06/13/23               Problem: Mobility       Dates: Start:  06/13/23         Goal: STG-Within one week, patient will ambulate 100 ft with FWW and contact guard assist       Dates: Start:  06/13/23            Goal: STG-Within one week, patient will ambulate up/down a 6 inch curb with FWW and min assist       Dates: Start:  06/13/23            Goal: STG-Within one week, patient will ascend and descend four to six stairs with bilateral hand rails and contact guard assist       Dates: Start:  06/13/23               Problem: Mobility Transfers       Dates: Start:  06/13/23         Goal: STG-Within one week, patient will perform bed mobility from flat bed with railing with contact guard assist       Dates: Start:  06/13/23            Goal: STG-Within one week, patient will sit to stand from edge of bed with contact guard assist       Dates: Start:  06/13/23            Goal: STG-Within one week, patient will teach back fall recovery with minimal cuing (limited ability to actually perform due to posterior hip precautions)       Dates: Start:  06/13/23               Problem: PT-Long Term Goals       Dates: Start:  06/13/23         Goal: LTG-By discharge, patient will transfer one surface to another with FWW and supervision assist       Dates: Start:  06/13/23            Goal: LTG-By discharge, patient will perform home exercise program from handouts with set up assist       Dates: Start:  06/13/23            Goal: LTG-By discharge, patient will ambulate up/down flight of stairs with bilateral railing and supervision assist       Dates: Start:  06/13/23            Goal: LTG-By discharge, patient will transfer in/out of a car with supervision assist and FWW       Dates: Start:  06/13/23            Goal: LTG-By discharge, patient will teach back hip precautions 100% with no cuing       Dates: Start:  06/13/23

## 2023-06-14 NOTE — THERAPY
"Physical Therapy   Daily Treatment     Patient Name: Chava Bray  Age:  87 y.o., Sex:  female  Medical Record #: [id number]  Today's Date: 6/14/2023     Precautions  Precautions: Fall Risk, Posterior Hip Precautions, Weight Bearing As Tolerated Left Lower Extremity  Comments: hx dementia, impaired safety awareness    Subjective    Patient agreeable to session. States she doesn't want to go to the gym \"because there are too many people\". RE: brought to [facility] gym.      Objective       06/14/23 1031   PT Charge Group   PT Therapeutic Exercise (Units) 2   PT Therapeutic Activities (Units) 2   PT Total Time Spent   PT Individual Total Time Spent (Mins) 60   Cognition    Ability To Follow Commands 1 Step   ABS (Agitated Behavior Scale)   Short Attention Span, Easy Distractibility, Inability to Concentrate 2   Impulsive, Impatient, Low Tolerance for Pain or Frustration 2   Uncooperative, Resistant to Care, Demanding 2   Violent and/or Threatening Violence Toward People or Property 1   Explosive and/or Unpredictable Anger 1   Rocking, Rubbing, Moaning, Other Self-Stimulating Behavior 1   Pulling at Tubes, Restraints, etc. 1   Wandering from Treatment Area 1   Restlessness, Pacing, Excessive Movement 1   Repetitive Behaviors, Motor and/or Verbal 1   Rapid, Loud or Excessive Talking 1   Sudden Changes of Mood 1   Easily Initiated - Excessive Crying and/or Laughter 1   Self-Abusiveness, Physical and/or Verbal 1   Agitated Behavior Scale Total Score 17   Level of Severity No Agitation   Transfer Functional Level of Assist   Bed, Chair, Wheelchair Transfer Moderate Assist   Bed Chair Wheelchair Transfer Description Adaptive equipment;Increased time;Set-up of equipment;Supervision for safety;Verbal cueing  (stand pivot w/c <> mat table)   Toilet Transfers Moderate Assist   Toilet Transfer Description Grab bar;Adaptive equipment;Increased time;Set-up of equipment;Verbal cueing;Supervision for safety  (x 2, total assist for " clothing management, cues for REY precautions)   Supine Lower Body Exercise   Bridges Two Legged;1 set of 10  (minimal AROM)   Hip Abduction Hook Lying;2 sets of 10;Bilateral  (AAROM throughout, intermittent physical resistance to tolerance)   Hip Adduction  2 sets of 10;Bilateral  (iso squeeze in hooklying)   Short Arc Quad 1 set of 15;Bilateral   Heel Slide 2 sets of 10;Left  (AAROM within tolerated nina)   Gluteal Isometrics 2 sets of 10;Bilateral   Quadriceps Isometrics 2 sets of 10;Left   Comments max cues throughout for correct sequencing/completion.   Bed Mobility    Supine to Sit Moderate Assist   Sit to Supine Maximal Assist   Sit to Stand Minimal Assist   Interdisciplinary Plan of Care Collaboration   IDT Collaboration with  Family / Caregiver;Physical Therapist   Patient Position at End of Therapy Seated;Chair Alarm On;Self Releasing Lap Belt Applied;Other (Comments)  (dining room)   Collaboration Comments CLOF w/ primary PT. discussed PLOF and home set up w/ spouse.     Provided rolled towels as adduction/IR block for BLEs positioning in w/c. Edu to pt on importance.     Discussion w/ spouse on PLOF:  - indep w/o device, 16 BG home w/ single railing (doesn't say which side) but all needs met on main level. They have a FWW, and he can provide 24/7 assistance.       Assessment    Patient requiring max cues throughout session for sequencing both functional mobility tasks and supine therex. She does demo a strong quad contraction during, but lacking glute strength/contraction at this time. Simple commands warranted for follow through. Poor positioning in w/c w/ LLE adduction/IR, might benefit from more solid sling bottom and or alternate cushion.     Strengths: Independent prior level of function, Pleasant and cooperative  Barriers: Constipation, Decreased endurance, Fatigue, Generalized weakness, Impaired activity tolerance, Impaired balance, Impaired carryover of learning, Impaired insight/denial of  deficits, Impaired functional cognition, Limited mobility, Pain    Plan  Gait with FWW, balance/ strength training as able, bed mobility, hip precaution recall. New cushion or solid bottom for chair to improve position??     Passport items to be completed:  Get in/out of bed safely, in/out of a vehicle, safely use mobility device, walk or wheel around home/community, navigate up and down stairs, show how to get up/down from the ground, ensure home is accessible, demonstrate HEP, complete caregiver training       Physical Therapy Problems (Active)       Problem: Balance       Dates: Start:  06/13/23         Goal: STG-Within one week, patient will improve standing balance to tolerate static standing x 1 min on firm surface with contact guard assist while performing reaching task       Dates: Start:  06/13/23               Problem: Mobility       Dates: Start:  06/13/23         Goal: STG-Within one week, patient will ambulate 100 ft with FWW and contact guard assist       Dates: Start:  06/13/23            Goal: STG-Within one week, patient will ambulate up/down a 6 inch curb with FWW and min assist       Dates: Start:  06/13/23            Goal: STG-Within one week, patient will ascend and descend four to six stairs with bilateral hand rails and contact guard assist       Dates: Start:  06/13/23               Problem: Mobility Transfers       Dates: Start:  06/13/23         Goal: STG-Within one week, patient will perform bed mobility from flat bed with railing with contact guard assist       Dates: Start:  06/13/23            Goal: STG-Within one week, patient will sit to stand from edge of bed with contact guard assist       Dates: Start:  06/13/23            Goal: STG-Within one week, patient will teach back fall recovery with minimal cuing (limited ability to actually perform due to posterior hip precautions)       Dates: Start:  06/13/23               Problem: PT-Long Term Goals       Dates: Start:  06/13/23          Goal: LTG-By discharge, patient will transfer one surface to another with FWW and supervision assist       Dates: Start:  06/13/23            Goal: LTG-By discharge, patient will perform home exercise program from handouts with set up assist       Dates: Start:  06/13/23            Goal: LTG-By discharge, patient will ambulate up/down flight of stairs with bilateral railing and supervision assist       Dates: Start:  06/13/23            Goal: LTG-By discharge, patient will transfer in/out of a car with supervision assist and FWW       Dates: Start:  06/13/23            Goal: LTG-By discharge, patient will teach back hip precautions 100% with no cuing       Dates: Start:  06/13/23

## 2023-06-14 NOTE — PROGRESS NOTES
Checked BP this morning. Right upper arm BP 88/48, HR 45. Left upper arm /61, HR 59.     Patient denies headache or dizziness, denies CP. Will continue to monitor.

## 2023-06-14 NOTE — PROGRESS NOTES
Doing a Alarm Audit on patient's when entered room CNA obtaining vitals. Blood pressure running below baseline when checked by CNA.  Saturation rate 87-94%-blood pressure 84/47-40-18.  Did a second blood pressure 98/62 highest bp obtained.  Heart rate irregular. RT made aware, charge nurses made aware as well as current RN and change of shift RN's. Charge Nurse stated  On call being contacted.

## 2023-06-14 NOTE — PROGRESS NOTES
"  Physical Medicine & Rehabilitation Progress Note    Encounter Date: 6/14/2023    Chief Complaint: Decreased mobility, constipation    Interval Events (Subjective):  Patient sitting up in room. She was having burning with foul smelling urine overnight so UA was sent. UA is positive for UTI. Will start her on omnicef. Denies fever or chills.     Objective:  VITAL SIGNS: /64   Pulse 72   Temp 36.4 °C (97.5 °F) (Oral)   Resp 20   Ht 1.6 m (5' 3\")   Wt 52.2 kg (115 lb)   SpO2 93%   BMI 20.37 kg/m²   Gen: NAD  Psych: Mood and affect appropriate  CV: RRR, 0 edema  Resp: CTAB, no upper airway sounds  Abd: NT mild distention  Neuro: AOx2, following commands  Unchanged from 6/13/23    Laboratory Values:  Recent Results (from the past 72 hour(s))   CoV-2, Flu A/B, And RSV by PCR ([facility])    Collection Time: 06/12/23  1:00 PM    Specimen: Respirate   Result Value Ref Range    Influenza virus A RNA Negative Negative    Influenza virus B, PCR Negative Negative    RSV, PCR Negative Negative    SARS-CoV-2 by PCR NotDetected     SARS-CoV-2 Source NP Swab    CBC with Differential    Collection Time: 06/13/23  6:03 AM   Result Value Ref Range    WBC 7.2 4.8 - 10.8 K/uL    RBC 4.43 4.20 - 5.40 M/uL    Hemoglobin 13.1 12.0 - 16.0 g/dL    Hematocrit 42.1 37.0 - 47.0 %    MCV 95.0 81.4 - 97.8 fL    MCH 29.6 27.0 - 33.0 pg    MCHC 31.1 (L) 32.2 - 35.5 g/dL    RDW 48.6 35.9 - 50.0 fL    Platelet Count 212 164 - 446 K/uL    MPV 10.7 9.0 - 12.9 fL    Neutrophils-Polys 45.10 44.00 - 72.00 %    Lymphocytes 32.80 22.00 - 41.00 %    Monocytes 10.00 0.00 - 13.40 %    Eosinophils 10.80 (H) 0.00 - 6.90 %    Basophils 1.00 0.00 - 1.80 %    Immature Granulocytes 0.30 0.00 - 0.90 %    Nucleated RBC 0.00 0.00 - 0.20 /100 WBC    Neutrophils (Absolute) 3.24 1.82 - 7.42 K/uL    Lymphs (Absolute) 2.36 1.00 - 4.80 K/uL    Monos (Absolute) 0.72 0.00 - 0.85 K/uL    Eos (Absolute) 0.78 (H) 0.00 - 0.51 K/uL    Baso (Absolute) 0.07 0.00 - 0.12 " K/uL    Immature Granulocytes (abs) 0.02 0.00 - 0.11 K/uL    NRBC (Absolute) 0.00 K/uL   Comp Metabolic Panel (CMP)    Collection Time: 06/13/23  6:03 AM   Result Value Ref Range    Sodium 139 135 - 145 mmol/L    Potassium 4.0 3.6 - 5.5 mmol/L    Chloride 101 96 - 112 mmol/L    Co2 28 20 - 33 mmol/L    Anion Gap 10.0 7.0 - 16.0    Glucose 90 65 - 99 mg/dL    Bun 15 8 - 22 mg/dL    Creatinine 0.67 0.50 - 1.40 mg/dL    Calcium 9.0 8.5 - 10.5 mg/dL    AST(SGOT) 21 12 - 45 U/L    ALT(SGPT) 18 2 - 50 U/L    Alkaline Phosphatase 118 (H) 30 - 99 U/L    Total Bilirubin 0.4 0.1 - 1.5 mg/dL    Albumin 3.1 (L) 3.2 - 4.9 g/dL    Total Protein 6.3 6.0 - 8.2 g/dL    Globulin 3.2 1.9 - 3.5 g/dL    A-G Ratio 1.0 g/dL   HEMOGLOBIN A1C    Collection Time: 06/13/23  6:03 AM   Result Value Ref Range    Glycohemoglobin 5.6 4.0 - 5.6 %    Est Avg Glucose 114 mg/dL   TSH with Reflex to FT4    Collection Time: 06/13/23  6:03 AM   Result Value Ref Range    TSH 2.950 0.380 - 5.330 uIU/mL   Vitamin D, 25-hydroxy (blood)    Collection Time: 06/13/23  6:03 AM   Result Value Ref Range    25-Hydroxy   Vitamin D 25 39 30 - 100 ng/mL   CORRECTED CALCIUM    Collection Time: 06/13/23  6:03 AM   Result Value Ref Range    Correct Calcium 9.7 8.5 - 10.5 mg/dL   ESTIMATED GFR    Collection Time: 06/13/23  6:03 AM   Result Value Ref Range    GFR (CKD-EPI) 85 >60 mL/min/1.73 m 2   URINALYSIS    Collection Time: 06/14/23  5:55 AM   Result Value Ref Range    Color Yellow     Character Turbid (A)     Specific Gravity 1.010 <1.035    Ph 6.5 5.0 - 8.0    Glucose Negative Negative mg/dL    Ketones Negative Negative mg/dL    Protein 30 (A) Negative mg/dL    Bilirubin Negative Negative    Urobilinogen, Urine 1.0 Negative    Nitrite Negative Negative    Leukocyte Esterase Large (A) Negative    Occult Blood Trace (A) Negative    Micro Urine Req Microscopic    URINE MICROSCOPIC (W/UA)    Collection Time: 06/14/23  5:55 AM   Result Value Ref Range    WBC Packed (A)  /hpf    RBC 0-2 /hpf    Bacteria Moderate (A) None /hpf    Epithelial Cells Negative /hpf    Hyaline Cast 0-2 /lpf       Medications:  Scheduled Medications   Medication Dose Frequency    cefdinir  300 mg Q12HRS    senna-docusate  2 Tablet BID    And    polyethylene glycol/lytes  1 Packet DAILY    Pharmacy Consult Request  1 Each PHARMACY TO DOSE    omeprazole  20 mg DAILY    enoxaparin (LOVENOX) injection  40 mg DAILY AT 1800     PRN medications: senna-docusate **AND** polyethylene glycol/lytes **AND** magnesium hydroxide **AND** bisacodyl, Respiratory Therapy Consult, hydrALAZINE, acetaminophen, mag hydrox-al hydrox-simeth, ondansetron **OR** ondansetron, traZODone, sodium chloride, oxyCODONE immediate-release **OR** oxyCODONE immediate-release    Diet:  Current Diet Order   Procedures    Diet Order Diet: Regular       Medical Decision Making and Plan:  Left hip fracture - Patient with mechanical fall at home with left hip fracture s/p hemiarthroplasty on 6/9/23 with Dr. Becker. Patient is WBAT, posterior precautions.   -PT and OT for mobility and ADLs. Per guidelines, 15 hours per week between PT, OT and/or SLP.  -Follow-up Dr. Becker     HTN - Previously on Lisinopril but low SBP after fall. Will monitor. Into 110s, will monitor     Azotemia - Worsening after surgery. Check AM CMP - improved to 15     Hypothyroidism - Patient was on Levothyroxine in the past. Currently not on it. Will check TSH wnl      Neuropathic pain - Patient with history of neuropathic pain from spinal stenosis. Previously on Gabapentin     Dysuria - Developed dysuria, UA + for UTI. Start Omnicef and send for cultures.     Pain - Patient on Tylenol scheduled and PRN oxycodone     Dementia - Does not appear to be on any medications. Will consult SLP and consider start medication    Skin - Patient at risk for skin breakdown due to debility in areas including sacrum, achilles, elbows and head in addition to other sites. Nursing to assess  skin daily.      GI Ppx - Patient on Prilosec for GERD prophylaxis. Patient on Senna-docusate for constipation prophylaxis.   -Severe constipation, mild distention, give Miralax. KUB on 6/13/23 - mild constipation in distal colon. Continue Miralax    DVT Ppx - Patient Lovenox on transfer.  ____________________________________    T. French John MD/PhD  [facility] - Physical Medicine & Rehabilitation   [facility] - Brain Injury Medicine   ____________________________________

## 2023-06-14 NOTE — PROGRESS NOTES
Patient was observed to have frequent urination tonight. Urine felix, hazy and concentrated. UA ordered per protocol. Pt remain afebrile.

## 2023-06-15 ENCOUNTER — APPOINTMENT (OUTPATIENT)
Dept: OCCUPATIONAL THERAPY | Facility: REHABILITATION | Age: 88
DRG: 560 | End: 2023-06-15
Attending: PHYSICAL MEDICINE & REHABILITATION
Payer: MEDICARE

## 2023-06-15 ENCOUNTER — APPOINTMENT (OUTPATIENT)
Dept: PHYSICAL THERAPY | Facility: REHABILITATION | Age: 88
DRG: 560 | End: 2023-06-15
Attending: PHYSICAL MEDICINE & REHABILITATION
Payer: MEDICARE

## 2023-06-15 PROCEDURE — 97116 GAIT TRAINING THERAPY: CPT | Mod: CQ

## 2023-06-15 PROCEDURE — 99233 SBSQ HOSP IP/OBS HIGH 50: CPT | Performed by: PHYSICAL MEDICINE & REHABILITATION

## 2023-06-15 PROCEDURE — 97530 THERAPEUTIC ACTIVITIES: CPT | Mod: CQ

## 2023-06-15 PROCEDURE — 700102 HCHG RX REV CODE 250 W/ 637 OVERRIDE(OP): Performed by: PHYSICAL MEDICINE & REHABILITATION

## 2023-06-15 PROCEDURE — 97110 THERAPEUTIC EXERCISES: CPT

## 2023-06-15 PROCEDURE — A9270 NON-COVERED ITEM OR SERVICE: HCPCS | Performed by: PHYSICAL MEDICINE & REHABILITATION

## 2023-06-15 PROCEDURE — 94760 N-INVAS EAR/PLS OXIMETRY 1: CPT

## 2023-06-15 PROCEDURE — 770010 HCHG ROOM/CARE - REHAB SEMI PRIVAT*

## 2023-06-15 PROCEDURE — 97535 SELF CARE MNGMENT TRAINING: CPT

## 2023-06-15 PROCEDURE — 700111 HCHG RX REV CODE 636 W/ 250 OVERRIDE (IP): Performed by: PHYSICAL MEDICINE & REHABILITATION

## 2023-06-15 RX ADMIN — OXYCODONE 5 MG: 5 TABLET ORAL at 08:51

## 2023-06-15 RX ADMIN — ENOXAPARIN SODIUM 40 MG: 100 INJECTION SUBCUTANEOUS at 17:44

## 2023-06-15 RX ADMIN — SENNOSIDES AND DOCUSATE SODIUM 2 TABLET: 50; 8.6 TABLET ORAL at 20:15

## 2023-06-15 RX ADMIN — OXYCODONE HYDROCHLORIDE 10 MG: 10 TABLET ORAL at 11:44

## 2023-06-15 RX ADMIN — NITROFURANTOIN MONOHYDRATE/MACROCRYSTALLINE 100 MG: 25; 75 CAPSULE ORAL at 17:43

## 2023-06-15 RX ADMIN — NITROFURANTOIN MONOHYDRATE/MACROCRYSTALLINE 100 MG: 25; 75 CAPSULE ORAL at 08:50

## 2023-06-15 RX ADMIN — SENNOSIDES AND DOCUSATE SODIUM 2 TABLET: 50; 8.6 TABLET ORAL at 08:50

## 2023-06-15 RX ADMIN — OMEPRAZOLE 20 MG: 20 CAPSULE, DELAYED RELEASE ORAL at 08:50

## 2023-06-15 RX ADMIN — OXYCODONE HYDROCHLORIDE 10 MG: 10 TABLET ORAL at 22:06

## 2023-06-15 RX ADMIN — OXYCODONE HYDROCHLORIDE 10 MG: 10 TABLET ORAL at 17:43

## 2023-06-15 RX ADMIN — POLYETHYLENE GLYCOL 3350 1 PACKET: 17 POWDER, FOR SOLUTION ORAL at 08:53

## 2023-06-15 RX ADMIN — TRAZODONE HYDROCHLORIDE 50 MG: 50 TABLET ORAL at 20:15

## 2023-06-15 RX ADMIN — ACETAMINOPHEN 650 MG: 325 TABLET ORAL at 11:15

## 2023-06-15 ASSESSMENT — ACTIVITIES OF DAILY LIVING (ADL)
BED_CHAIR_WHEELCHAIR_TRANSFER_DESCRIPTION: ADAPTIVE EQUIPMENT;SET-UP OF EQUIPMENT;SUPERVISION FOR SAFETY;VERBAL CUEING
TOILET_TRANSFER_DESCRIPTION: GRAB BAR;INCREASED TIME;INITIAL PREPARATION FOR TASK;SET-UP OF EQUIPMENT;SUPERVISION FOR SAFETY;VERBAL CUEING
BED_CHAIR_WHEELCHAIR_TRANSFER_DESCRIPTION: INCREASED TIME;INITIAL PREPARATION FOR TASK;SET-UP OF EQUIPMENT;SUPERVISION FOR SAFETY;VERBAL CUEING
BED_CHAIR_WHEELCHAIR_TRANSFER_DESCRIPTION: ADAPTIVE EQUIPMENT;INCREASED TIME;INITIAL PREPARATION FOR TASK;SUPERVISION FOR SAFETY
TOILET_TRANSFER_DESCRIPTION: GRAB BAR;SUPERVISION FOR SAFETY;VERBAL CUEING
BED_CHAIR_WHEELCHAIR_TRANSFER_DESCRIPTION: INCREASED TIME;INITIAL PREPARATION FOR TASK;SUPERVISION FOR SAFETY;VERBAL CUEING

## 2023-06-15 ASSESSMENT — PAIN DESCRIPTION - PAIN TYPE
TYPE: ACUTE PAIN

## 2023-06-15 ASSESSMENT — GAIT ASSESSMENTS
GAIT LEVEL OF ASSIST: MINIMAL ASSIST
GAIT LEVEL OF ASSIST: CONTACT GUARD ASSIST
DISTANCE (FEET): 45
DEVIATION: ANTALGIC;DECREASED BASE OF SUPPORT
ASSISTIVE DEVICE: FRONT WHEEL WALKER
ASSISTIVE DEVICE: FRONT WHEEL WALKER
DISTANCE (FEET): 30
DEVIATION: ANTALGIC;DECREASED BASE OF SUPPORT

## 2023-06-15 ASSESSMENT — PATIENT HEALTH QUESTIONNAIRE - PHQ9
2. FEELING DOWN, DEPRESSED, IRRITABLE, OR HOPELESS: NOT AT ALL
SUM OF ALL RESPONSES TO PHQ9 QUESTIONS 1 AND 2: 0
1. LITTLE INTEREST OR PLEASURE IN DOING THINGS: NOT AT ALL

## 2023-06-15 ASSESSMENT — PAIN SCALES - WONG BAKER
WONGBAKER_NUMERICALRESPONSE: HURTS JUST A LITTLE BIT
WONGBAKER_NUMERICALRESPONSE: HURTS A WHOLE LOT

## 2023-06-15 NOTE — THERAPY
"Physical Therapy   Daily Treatment     Patient Name: Chava Bray  Age:  87 y.o., Sex:  female  Medical Record #: [id number]  Today's Date: 6/15/2023     Precautions  Precautions: Fall Risk, Posterior Hip Precautions, Weight Bearing As Tolerated Left Lower Extremity  Comments: hx dementia, impaired safety awareness    Subjective    Patient is found in dining room following breakfast, patient states she \"couldn't eat the dave because it was too hard to chew, and the eggs were yucky\". When asked what she did eat, she replies \"orange juice, but I'm not hungry\"     Objective       06/15/23 0831   PT Charge Group   PT Gait Training (Units) 2   PT Therapeutic Exercise (Units) 2   PT Total Time Spent   PT Individual Total Time Spent (Mins) 60   Vitals   Blood Pressure  104/69   Gait Functional Level of Assist    Gait Level Of Assist Minimal Assist   Assistive Device Front Wheel Walker   Distance (Feet) 45   # of Times Distance was Traveled 1   Deviation Antalgic;Decreased Base Of Support  (cues for path finding and walker management)   Stairs Functional Level of Assist   Level of Assist with Stairs Contact Guard Assist   # of Stairs Climbed 6   Stairs Description Extra time;Hand rails;Limited by fatigue;Safety concerns;Supervision for safety;Verbal cueing  (cues for sequencing)   Transfer Functional Level of Assist   Bed, Chair, Wheelchair Transfer Minimal Assist   Bed Chair Wheelchair Transfer Description Increased time;Initial preparation for task;Supervision for safety;Verbal cueing   Supine Lower Body Exercise   Bridges Two Legged;1 set of 10   Hip Abduction Hook Lying;1 set of 10;Bilateral   Short Arc Quad 2 sets of 10;Bilateral   Heel Slide 1 set of 10;Bilateral   Sitting Lower Body Exercises   Long Arc Quad 2 sets of 10;Bilateral   Marching 2 sets of 10   Sit to Stand 2 sets of 10   Bed Mobility    Supine to Sit Moderate Assist   Sit to Supine Moderate Assist   Sit to Stand Contact Guard Assist "   Interdisciplinary Plan of Care Collaboration   IDT Collaboration with  Physical Therapist;Nursing   Patient Position at End of Therapy Seated;Chair Alarm On;Call Light within Reach;Tray Table within Reach;Phone within Reach   Collaboration Comments discussed POC with other PT, RN provided meds during 0830 PT session         Assessment    Patient was able to recall 1/3 hip precautions after frequent review during mat table exercises, was able to ambulate farther and to climb stairs with good sequencing with cues. She is making progress, is anticipated to continue making gains as UTI clears up and as she learns and applies hip precautions to movements.   Strengths: Independent prior level of function, Pleasant and cooperative  Barriers: Constipation, Decreased endurance, Fatigue, Generalized weakness, Impaired activity tolerance, Impaired balance, Impaired carryover of learning, Impaired insight/denial of deficits, Impaired functional cognition, Limited mobility, Pain    Plan    Gait with FWW, hip precautions, balance, cardiovascular endurance    Passport items to be completed:  Get in/out of bed safely, in/out of a vehicle, safely use mobility device, walk or wheel around home/community, navigate up and down stairs, show how to get up/down from the ground, ensure home is accessible, demonstrate HEP, complete caregiver training    Physical Therapy Problems (Active)       Problem: Balance       Dates: Start:  06/13/23         Goal: STG-Within one week, patient will improve standing balance to tolerate static standing x 1 min on firm surface with contact guard assist while performing reaching task       Dates: Start:  06/13/23               Problem: Mobility       Dates: Start:  06/13/23         Goal: STG-Within one week, patient will ambulate 100 ft with FWW and contact guard assist       Dates: Start:  06/13/23            Goal: STG-Within one week, patient will ambulate up/down a 6 inch curb with FWW and min assist        Dates: Start:  06/13/23            Goal: STG-Within one week, patient will ascend and descend four to six stairs with bilateral hand rails and contact guard assist       Dates: Start:  06/13/23               Problem: Mobility Transfers       Dates: Start:  06/13/23         Goal: STG-Within one week, patient will perform bed mobility from flat bed with railing with contact guard assist       Dates: Start:  06/13/23            Goal: STG-Within one week, patient will sit to stand from edge of bed with contact guard assist       Dates: Start:  06/13/23            Goal: STG-Within one week, patient will teach back fall recovery with minimal cuing (limited ability to actually perform due to posterior hip precautions)       Dates: Start:  06/13/23               Problem: PT-Long Term Goals       Dates: Start:  06/13/23         Goal: LTG-By discharge, patient will transfer one surface to another with FWW and supervision assist       Dates: Start:  06/13/23            Goal: LTG-By discharge, patient will perform home exercise program from handouts with set up assist       Dates: Start:  06/13/23            Goal: LTG-By discharge, patient will ambulate up/down flight of stairs with bilateral railing and supervision assist       Dates: Start:  06/13/23            Goal: LTG-By discharge, patient will transfer in/out of a car with supervision assist and FWW       Dates: Start:  06/13/23            Goal: LTG-By discharge, patient will teach back hip precautions 100% with no cuing       Dates: Start:  06/13/23

## 2023-06-15 NOTE — CARE PLAN
The patient is Stable - Low risk of patient condition declining or worsening    Shift Goals  Clinical Goals: fall prevention, pain control  Patient Goals: pain control, rest  Family Goals: none present    Progress made toward(s) clinical / shift goals:    Problem: Pain - Standard  Goal: Alleviation of pain or a reduction in pain to the patient’s comfort goal  Outcome: Progressing  Note:   1. Document pain using the appropriate pain scale per order or unit policy   2. Educate and implement non-pharmacologic comfort measures (karlo. relaxation, distraction, massage, cold/heat therapy, etc.)   3. Pain management medications as ordered   4. Reassess pain after pain med administration per policy   5. If opiods administered assess patient's response to pain medication is appropriate per POSS sedation scale   6. Follow pain management plan developed in collaboration with patient and interdisciplinary team (including palliative care or pain specialists if applicable)     Problem: Fall Risk - Rehab  Goal: Patient will remain free from falls  Outcome: Progressing  Note:   1. Assess for fall risk factors   2. Implement Universal Fall Precautions on all patients: call light and belongings in reach, educate to call for help, bed in low position, non-slip footwear, wheel chair seat belt, bed alarms at night and gait belts when up/ambulating   3. New admits: 72 hour assessment including wheel chair alarm assessment period and Bed/strip alarm assessment   4. High Risk (15 or greater): Universal Fall precautions, Medium risk interventions, alarming seat belts, observation at nursing station, and assess need for camera room, Soma/net bed, 1:1 observation/sitter, flag on wheelchair       Patient is not progressing towards the following goals: NA

## 2023-06-15 NOTE — CARE PLAN
Problem: Bathing  Goal: STG-Within one week, patient will bathe with min A using AE as needed to maintain hip prec.   Outcome: Not Met     Problem: Dressing  Goal: STG-Within one week, patient will dress LB with min A using AE as needed to maintain hip prec.   Outcome: Not Met     Problem: Toileting  Goal: STG-Within one week, patient will complete toileting tasks with min A using AE as needed.   Outcome: Not Met     Problem: Functional Transfers  Goal: STG-Within one week, patient will transfer to toilet with CGA using LRAD.   Outcome: Met

## 2023-06-15 NOTE — CARE PLAN
"The patient is Stable - Low risk of patient condition declining or worsening    Shift Goals  Clinical Goals: safety  Patient Goals: safety, pain  Family Goals: not present      Problem: Pain - Standard  Goal: Alleviation of pain or a reduction in pain to the patient’s comfort goal  Outcome: Progressing  Note: Patient was up to the bathroom and chair several times and verbalize that she was hungry and given cranberry juice and crackers.  Will continue to monitor.      Problem: Fall Risk - Rehab  Goal: Patient will remain free from falls  Outcome: Progressing  Note: Brianna Brady Fall risk Assessment Score: 21    High fall risk Interventions   - Alarming seatbelt  - Yellow sign by the door   - Yellow wrist band \"Fall risk\"  - Room near to the nurse station  - Do not leave patient unattended in the bathroom  - Fall risk education provided    Chair and bed alarm in place and on at all times to alert staff.  Reminded patient to used call light when in need of help and re-orient and demonstrate how to used. Call light within reach. Continue on close monitoring.            Problem: Infection  Goal: Patient will remain free from infection  Outcome: Progressing  Note: Patient remains free from s/s infection; afebrile.  Continue on oral Macrobid for UTI with no adverse reactions noted. Will continue to monitor.        "

## 2023-06-15 NOTE — FLOWSHEET NOTE
06/14/23 1831   Events/Summary/Plan   Events/Summary/Plan SpO2 check, begin room air trial   Vital Signs   Pulse (!) 50   Respiration 16   Pulse Oximetry 96 %   $ Pulse Oximetry (Spot Check) Yes   Oxygen   O2 Delivery Device None - Room Air

## 2023-06-15 NOTE — FLOWSHEET NOTE
06/15/23 0659   Events/Summary/Plan   Events/Summary/Plan RA pulse ox check. Appears to be sleeping   Vital Signs   Pulse (!) 41   Respiration 16   Pulse Oximetry 91 %   $ Pulse Oximetry (Spot Check) Yes   Respiratory Assessment   Respiratory Pattern Within Normal Limits   Level of Consciousness Responds to voice   Oxygen   O2 Delivery Device Room air w/o2 available

## 2023-06-15 NOTE — CARE PLAN
Problem: Balance  Goal: STG-Within one week, patient will improve standing balance to tolerate static standing x 1 min on firm surface with contact guard assist while performing reaching task  Outcome: Not Met     Problem: Mobility  Goal: STG-Within one week, patient will ambulate 100 ft with FWW and contact guard assist  Outcome: Not Met  Goal: STG-Within one week, patient will ambulate up/down a 6 inch curb with FWW and min assist  Outcome: Not Met     Problem: Mobility Transfers  Goal: STG-Within one week, patient will perform bed mobility from flat bed with railing with contact guard assist  Outcome: Not Met  Goal: STG-Within one week, patient will teach back fall recovery with minimal cuing (limited ability to actually perform due to posterior hip precautions)  Outcome: Not Met     Problem: Mobility  Goal: STG-Within one week, patient will ascend and descend four to six stairs with bilateral hand rails and contact guard assist  Outcome: Met     Problem: Mobility Transfers  Goal: STG-Within one week, patient will sit to stand from edge of bed with contact guard assist  Outcome: Met

## 2023-06-15 NOTE — THERAPY
Physical Therapy   Daily Treatment     Patient Name: Chava Bray  Age:  87 y.o., Sex:  female  Medical Record #: [id number]  Today's Date: 6/15/2023     Precautions  Precautions: Fall Risk, Posterior Hip Precautions, Weight Bearing As Tolerated Left Lower Extremity  Comments: hx dementia, impaired safety awareness    Subjective    Pt was in bed upon arrival, agreeable to session and requested to use the bathroom.     Objective       06/15/23 1231   PT Charge Group   PT Gait Training (Units) 1   PT Therapeutic Activities (Units) 1   Supervising Physical Therapist Buffy Esteban   PT Total Time Spent   PT Individual Total Time Spent (Mins) 30   Pain 0 - 10 Group   Location Hip   Therapist Pain Assessment Post Activity Pain Same as Prior to Activity   Cognition    Level of Consciousness Alert   Safety Awareness Impaired   Gait Functional Level of Assist    Gait Level Of Assist Contact Guard Assist   Assistive Device Front Wheel Walker   Distance (Feet) 30   # of Times Distance was Traveled 2   Deviation Antalgic;Decreased Base Of Support   Transfer Functional Level of Assist   Bed, Chair, Wheelchair Transfer Contact Guard Assist   Bed Chair Wheelchair Transfer Description Adaptive equipment;Increased time;Initial preparation for task;Supervision for safety  (FWW)   Toilet Transfers Contact Guard Assist   Toilet Transfer Description Adaptive equipment;Grab bar;Initial preparation for task;Supervision for safety  (FWW)   Bed Mobility    Supine to Sit Moderate Assist   Sit to Stand Contact Guard Assist   Scooting Contact Guard Assist   Interdisciplinary Plan of Care Collaboration   IDT Collaboration with  Physical Therapist;Occupational Therapist   Patient Position at End of Therapy Seated  (transition care to OT)   Collaboration Comments POC     Review posterior hip precaution, recall 2/3 but not consistent. Spent 5 mins reviewing but pt was not able to carryover the education.    Assessment    Pt was limited by pain  and fatigue during session, inconsistent on recalling posterior hip precaution. Was pleasant throughout session.    Strengths: Independent prior level of function, Pleasant and cooperative  Barriers: Constipation, Decreased endurance, Fatigue, Generalized weakness, Impaired activity tolerance, Impaired balance, Impaired carryover of learning, Impaired insight/denial of deficits, Impaired functional cognition, Limited mobility, Pain    Plan    Gait with FWW, balance/ strength training as able, bed mobility, hip precaution recall. New cushion or solid bottom for chair to improve position??     Passport items to be completed:  Get in/out of bed safely, in/out of a vehicle, safely use mobility device, walk or wheel around home/community, navigate up and down stairs, show how to get up/down from the ground, ensure home is accessible, demonstrate HEP, complete caregiver training    Physical Therapy Problems (Active)       Problem: Balance       Dates: Start:  06/13/23         Goal: STG-Within one week, patient will improve standing balance to tolerate static standing x 1 min on firm surface with contact guard assist while performing reaching task       Dates: Start:  06/13/23               Problem: Mobility       Dates: Start:  06/13/23         Goal: STG-Within one week, patient will ambulate 100 ft with FWW and contact guard assist       Dates: Start:  06/13/23            Goal: STG-Within one week, patient will ambulate up/down a 6 inch curb with FWW and min assist       Dates: Start:  06/13/23               Problem: Mobility Transfers       Dates: Start:  06/13/23         Goal: STG-Within one week, patient will perform bed mobility from flat bed with railing with contact guard assist       Dates: Start:  06/13/23            Goal: STG-Within one week, patient will teach back fall recovery with minimal cuing (limited ability to actually perform due to posterior hip precautions)       Dates: Start:  06/13/23                Problem: PT-Long Term Goals       Dates: Start:  06/13/23         Goal: LTG-By discharge, patient will transfer one surface to another with FWW and supervision assist       Dates: Start:  06/13/23            Goal: LTG-By discharge, patient will perform home exercise program from handouts with set up assist       Dates: Start:  06/13/23            Goal: LTG-By discharge, patient will ambulate up/down flight of stairs with bilateral railing and supervision assist       Dates: Start:  06/13/23            Goal: LTG-By discharge, patient will transfer in/out of a car with supervision assist and FWW       Dates: Start:  06/13/23            Goal: LTG-By discharge, patient will teach back hip precautions 100% with no cuing       Dates: Start:  06/13/23

## 2023-06-15 NOTE — THERAPY
Occupational Therapy  Daily Treatment     Patient Name: Chava Bray  Age:  87 y.o., Sex:  female  Medical Record #: [id number]  Today's Date: 6/15/2023     Precautions  Precautions: Fall Risk, Posterior Hip Precautions, Weight Bearing As Tolerated Left Lower Extremity  Comments: hx dementia, impaired safety awareness    Subjective    Pt pleasant and motivated to participate in OT    During 1431 PM session, pt reported sharp, 10/10 L hip pain. Pt informed this therapist she had bent over in bed to take off her socks (pt having difficulty adhering to precautions d/t dementia). This therapist noted no leg length discrepancy; notified RN.     Objective     06/15/23 1301 06/15/23 1431   OT Charge Group   Charges Yes Yes   OT Self Care / ADL (Units) 1 2   OT Therapeutic Exercise (Units) 1  --    OT Total Time Spent   OT Individual Total Time Spent (Mins) 30 30   Precautions   Precautions Fall Risk;Posterior Hip Precautions;Weight Bearing As Tolerated Left Lower Extremity Fall Risk;Posterior Hip Precautions;Weight Bearing As Tolerated Left Lower Extremity   Comments hx dementia, impaired safety awareness hx dementia, impaired safety awareness   Vitals   O2 Delivery Device None - Room Air None - Room Air   Pain 0 - 10 Group   Location Arm Hip   Location Orientation Left Left   Pain Rating Scale (NPRS)  --  10   Description Sore Sharp   Cognition    Level of Consciousness Alert Alert   ABS (Agitated Behavior Scale)   Agitated Behavior Scale Performed Yes Yes   Short Attention Span, Easy Distractibility, Inability to Concentrate 2 2   Impulsive, Impatient, Low Tolerance for Pain or Frustration 1 1   Uncooperative, Resistant to Care, Demanding 1 1   Violent and/or Threatening Violence Toward People or Property 1 1   Explosive and/or Unpredictable Anger 1 1   Rocking, Rubbing, Moaning, Other Self-Stimulating Behavior 1 1   Pulling at Tubes, Restraints, etc. 1 1   Wandering from Treatment Area 1 1   Restlessness, Pacing,  Excessive Movement 1 1   Repetitive Behaviors, Motor and/or Verbal 1 2   Rapid, Loud or Excessive Talking 1 1   Sudden Changes of Mood 1 1   Easily Initiated - Excessive Crying and/or Laughter 1 1   Self-Abusiveness, Physical and/or Verbal 1 1   Agitated Behavior Scale Total Score 15 16   Level of Severity No Agitation No Agitation   Sleep/Wake Cycle   Sleep & Rest Awake;Out of bed Resting   Functional Level of Assist   Grooming Minimal Assist;Seated Standby Assist;Standing   Grooming Description Increased time;Initial preparation for task;Seated in wheelchair at sink;Set-up of equipment;Supervision for safety;Verbal cueing  (Pt able to brush her hair; however, required assist to put hair into ponytail) Increased time;Initial preparation for task;Set-up of equipment;Standing at sink;Supervision for safety;Verbal cueing   Lower Body Dressing  --  Moderate Assist   Lower Body Dressing Description  --  Sock aid;Increased time;Initial preparation for task;Set-up of equipment;Supervision for safety;Verbal cueing  (Educated pt and provided demo for sock aide; pt able to demo back w/ assist to load socks. Provided v/c for technique.)   Toileting  --  Moderate Assist   Toileting Description  --  Assist to pull pants up;Assist to pull pants down;Assist for standing balance;Grab bar;Increased time;Set-up of equipment;Initial preparation for task;Supervision for safety;Verbal cueing   Bed, Chair, Wheelchair Transfer  --  Contact Guard Assist   Bed Chair Wheelchair Transfer Description  --  Increased time;Initial preparation for task;Set-up of equipment;Supervision for safety;Verbal cueing   Toilet Transfers  --  Contact Guard Assist   Toilet Transfer Description  --  Grab bar;Increased time;Initial preparation for task;Set-up of equipment;Supervision for safety;Verbal cueing   Sitting Upper Body Exercises   Sitting Upper Body Exercises Yes  --    Upper Extremity Bike Level 4 Resistance  (Pt tolerated 10 minutes w/o rest break)   --    Bed Mobility    Supine to Sit  --  Contact Guard Assist   Scooting  --  Contact Guard Assist   Rolling  --  Contact Guard Assist   Interdisciplinary Plan of Care Collaboration   IDT Collaboration with  Physical Therapist Nursing   Patient Position at End of Therapy Seated;Chair Alarm On;Call Light within Reach;Tray Table within Reach Seated;Chair Alarm On;Self Releasing Lap Belt Applied;Call Light within Reach;Tray Table within Reach;Phone within Reach   Collaboration Comments Pt hand off Reported hip pain to RN   Strengths & Barriers   Strengths Motivated for self care and independence;Pleasant and cooperative;Supportive family;Willingly participates in therapeutic activities Motivated for self care and independence;Pleasant and cooperative;Supportive family;Willingly participates in therapeutic activities   Barriers Decreased endurance;Fatigue;Generalized weakness;Home accessibility;Impaired activity tolerance;Impaired balance;Impaired carryover of learning;Impaired insight/denial of deficits;Impaired functional cognition;Limited mobility;Pain;Pain poorly managed Decreased endurance;Fatigue;Generalized weakness;Home accessibility;Impaired activity tolerance;Impaired balance;Impaired carryover of learning;Impaired insight/denial of deficits;Impaired functional cognition;Limited mobility;Pain;Pain poorly managed     Assessment    Pt seen for OT tx, addressing functional transfers, toileting, grooming, and LB dressing w/ sock aide. Pt requires consistence v/c for AE technique and hip precautions. Pt progressing towards goals. Continue OT POC.    Strengths: Motivated for self care and independence, Pleasant and cooperative, Supportive family, Willingly participates in therapeutic activities    Barriers: Decreased endurance, Fatigue, Generalized weakness, Home accessibility, Impaired activity tolerance, Impaired balance, Impaired carryover of learning, Impaired insight/denial of deficits, Impaired functional  cognition, Limited mobility, Pain, Pain poorly managed    Plan    ADLs w/ AE education as able, functional mobility/transfers, strength/endurance, balance, functional cog. Contact SO re: bathroom set up/equipment needs.    Passport items to be completed:  Perform bathroom transfers, complete dressing, complete feeding, get ready for the day, prepare a simple meal, participate in household tasks, adapt home for safety needs, demonstrate home exercise program, complete caregiver training     Occupational Therapy Goals (Active)       Problem: Bathing       Dates: Start:  06/13/23         Goal: STG-Within one week, patient will bathe with min A using AE as needed to maintain hip prec.        Dates: Start:  06/13/23               Problem: Dressing       Dates: Start:  06/13/23         Goal: STG-Within one week, patient will dress LB with min A using AE as needed to maintain hip prec.        Dates: Start:  06/13/23               Problem: OT Long Term Goals       Dates: Start:  06/13/23         Goal: LTG-By discharge, patient will complete basic self care tasks at supv to mod I level using AE as needed to maintain hip prec.        Dates: Start:  06/13/23            Goal: LTG-By discharge, patient will perform bathroom transfers at supv to mod I level using AD/DME as needed.        Dates: Start:  06/13/23               Problem: Toileting       Dates: Start:  06/13/23         Goal: STG-Within one week, patient will complete toileting tasks with min A using AE as needed.        Dates: Start:  06/13/23

## 2023-06-15 NOTE — PROGRESS NOTES
"  Physical Medicine & Rehabilitation Progress Note    Encounter Date: 6/15/2023    Chief Complaint: Decreased mobility, constipation    Interval Events (Subjective):  Patient sitting up in room. She reports she is doing OK. Denies pain Denies NVD.     _____________________________________  Interdisciplinary Team Conference   Most recent IDT on 6/15/2023    IDoreen M.D./Ph.D., was present and led the interdisciplinary team conference on 6/15/2023.  I led the IDT conference and agree with the IDT conference documentation and plan of care as noted below.     Nursing:  Diet Current Diet Order   Procedures    Diet Order Diet: Regular       Eating ADL Supervision  Supervision for safety, Set-up of equipment or meal/tube feeding   % of Last Meal  Oral Nutrition: Lunch, Between 50-75% Consumed   Sleep    Bowel Last BM: 06/14/23   Bladder Incontinent   Barriers to Discharge Home:  Pain limited    Physical Therapy:  Bed Mobility    Transfers Contact Guard Assist  Adaptive equipment, Set-up of equipment, Supervision for safety, Verbal cueing   Mobility Minimal Assist   Stairs    Barriers to Discharge Home:  Inconsistent on hip precautions  Improved today - supervs    Occupational Therapy:  Grooming Supervision, Seated   Bathing Moderate Assist   UB Dressing Minimal Assist   LB Dressing Maximal Assist   Toileting Maximal Assist   Shower & Transfer    Barriers to Discharge Home:  Cognitively limited    Bedside commoded    Speech-Language Pathology:  Pending eval    Case Management:  Continues to work on disposition and DME needs.      Discharge Date/Disposition:  6/26/23  _____________________________________      Objective:  VITAL SIGNS: /69   Pulse 70   Temp 36.4 °C (97.5 °F) (Oral)   Resp 20   Ht 1.6 m (5' 3\")   Wt 52.2 kg (115 lb)   SpO2 93%   BMI 20.37 kg/m²   Gen: NAD  Psych: Mood and affect appropriate  CV: RRR, 0 edema  Resp: CTAB, no upper airway sounds  Abd: NTND  Neuro: AOx2, following " commands    Laboratory Values:  Recent Results (from the past 72 hour(s))   CoV-2, Flu A/B, And RSV by PCR ([employer])    Collection Time: 06/12/23  1:00 PM    Specimen: Respirate   Result Value Ref Range    Influenza virus A RNA Negative Negative    Influenza virus B, PCR Negative Negative    RSV, PCR Negative Negative    SARS-CoV-2 by PCR NotDetected     SARS-CoV-2 Source NP Swab    CBC with Differential    Collection Time: 06/13/23  6:03 AM   Result Value Ref Range    WBC 7.2 4.8 - 10.8 K/uL    RBC 4.43 4.20 - 5.40 M/uL    Hemoglobin 13.1 12.0 - 16.0 g/dL    Hematocrit 42.1 37.0 - 47.0 %    MCV 95.0 81.4 - 97.8 fL    MCH 29.6 27.0 - 33.0 pg    MCHC 31.1 (L) 32.2 - 35.5 g/dL    RDW 48.6 35.9 - 50.0 fL    Platelet Count 212 164 - 446 K/uL    MPV 10.7 9.0 - 12.9 fL    Neutrophils-Polys 45.10 44.00 - 72.00 %    Lymphocytes 32.80 22.00 - 41.00 %    Monocytes 10.00 0.00 - 13.40 %    Eosinophils 10.80 (H) 0.00 - 6.90 %    Basophils 1.00 0.00 - 1.80 %    Immature Granulocytes 0.30 0.00 - 0.90 %    Nucleated RBC 0.00 0.00 - 0.20 /100 WBC    Neutrophils (Absolute) 3.24 1.82 - 7.42 K/uL    Lymphs (Absolute) 2.36 1.00 - 4.80 K/uL    Monos (Absolute) 0.72 0.00 - 0.85 K/uL    Eos (Absolute) 0.78 (H) 0.00 - 0.51 K/uL    Baso (Absolute) 0.07 0.00 - 0.12 K/uL    Immature Granulocytes (abs) 0.02 0.00 - 0.11 K/uL    NRBC (Absolute) 0.00 K/uL   Comp Metabolic Panel (CMP)    Collection Time: 06/13/23  6:03 AM   Result Value Ref Range    Sodium 139 135 - 145 mmol/L    Potassium 4.0 3.6 - 5.5 mmol/L    Chloride 101 96 - 112 mmol/L    Co2 28 20 - 33 mmol/L    Anion Gap 10.0 7.0 - 16.0    Glucose 90 65 - 99 mg/dL    Bun 15 8 - 22 mg/dL    Creatinine 0.67 0.50 - 1.40 mg/dL    Calcium 9.0 8.5 - 10.5 mg/dL    AST(SGOT) 21 12 - 45 U/L    ALT(SGPT) 18 2 - 50 U/L    Alkaline Phosphatase 118 (H) 30 - 99 U/L    Total Bilirubin 0.4 0.1 - 1.5 mg/dL    Albumin 3.1 (L) 3.2 - 4.9 g/dL    Total Protein 6.3 6.0 - 8.2 g/dL    Globulin 3.2 1.9 - 3.5 g/dL     A-G Ratio 1.0 g/dL   HEMOGLOBIN A1C    Collection Time: 06/13/23  6:03 AM   Result Value Ref Range    Glycohemoglobin 5.6 4.0 - 5.6 %    Est Avg Glucose 114 mg/dL   TSH with Reflex to FT4    Collection Time: 06/13/23  6:03 AM   Result Value Ref Range    TSH 2.950 0.380 - 5.330 uIU/mL   Vitamin D, 25-hydroxy (blood)    Collection Time: 06/13/23  6:03 AM   Result Value Ref Range    25-Hydroxy   Vitamin D 25 39 30 - 100 ng/mL   CORRECTED CALCIUM    Collection Time: 06/13/23  6:03 AM   Result Value Ref Range    Correct Calcium 9.7 8.5 - 10.5 mg/dL   ESTIMATED GFR    Collection Time: 06/13/23  6:03 AM   Result Value Ref Range    GFR (CKD-EPI) 85 >60 mL/min/1.73 m 2   URINE CULTURE-EXISTING-LESS THAN 48 HOURS    Collection Time: 06/14/23  5:05 AM    Specimen: Urine, Straight Cath   Result Value Ref Range    Significant Indicator POS (POS)     Source UR     Site URINE, STRAIGHT CATH     Culture Result - (A)     Culture Result (A)      Lactose fermenting Gram negative bobbi  >100,000 cfu/mL     URINALYSIS    Collection Time: 06/14/23  5:55 AM   Result Value Ref Range    Color Yellow     Character Turbid (A)     Specific Gravity 1.010 <1.035    Ph 6.5 5.0 - 8.0    Glucose Negative Negative mg/dL    Ketones Negative Negative mg/dL    Protein 30 (A) Negative mg/dL    Bilirubin Negative Negative    Urobilinogen, Urine 1.0 Negative    Nitrite Negative Negative    Leukocyte Esterase Large (A) Negative    Occult Blood Trace (A) Negative    Micro Urine Req Microscopic    URINE MICROSCOPIC (W/UA)    Collection Time: 06/14/23  5:55 AM   Result Value Ref Range    WBC Packed (A) /hpf    RBC 0-2 /hpf    Bacteria Moderate (A) None /hpf    Epithelial Cells Negative /hpf    Hyaline Cast 0-2 /lpf       Medications:  Scheduled Medications   Medication Dose Frequency    nitrofurantoin  100 mg BID WITH MEALS    senna-docusate  2 Tablet BID    And    polyethylene glycol/lytes  1 Packet DAILY    Pharmacy Consult Request  1 Each PHARMACY TO DOSE     omeprazole  20 mg DAILY    enoxaparin (LOVENOX) injection  40 mg DAILY AT 1800     PRN medications: senna-docusate **AND** polyethylene glycol/lytes **AND** magnesium hydroxide **AND** bisacodyl, Respiratory Therapy Consult, hydrALAZINE, acetaminophen, mag hydrox-al hydrox-simeth, ondansetron **OR** ondansetron, traZODone, sodium chloride, oxyCODONE immediate-release **OR** oxyCODONE immediate-release    Diet:  Current Diet Order   Procedures    Diet Order Diet: Regular       Medical Decision Making and Plan:  Left hip fracture - Patient with mechanical fall at home with left hip fracture s/p hemiarthroplasty on 6/9/23 with Dr. Becker. Patient is WBAT, posterior precautions.   -PT and OT for mobility and ADLs. Per guidelines, 15 hours per week between PT, OT and/or SLP.  -Follow-up Dr. Becker     HTN - Previously on Lisinopril but low SBP after fall. Will monitor. Into 110s, will monitor     Azotemia - Worsening after surgery. Check AM CMP - improved to 15     Hypothyroidism - Patient was on Levothyroxine in the past. Currently not on it. Will check TSH wnl      Neuropathic pain - Patient with history of neuropathic pain from spinal stenosis. Previously on Gabapentin     Dysuria - Developed dysuria, UA + for UTI. Start Omnicef and send for cultures. PharmD recommending change to Macrobid    Pain - Patient on Tylenol scheduled and PRN oxycodone     Dementia - Does not appear to be on any medications. Will consult SLP and consider start medication    Skin - Patient at risk for skin breakdown due to debility in areas including sacrum, achilles, elbows and head in addition to other sites. Nursing to assess skin daily.      GI Ppx - Patient on Prilosec for GERD prophylaxis. Patient on Senna-docusate for constipation prophylaxis.   -Severe constipation, mild distention, give Miralax. KUB on 6/13/23 - mild constipation in distal colon. Continue Miralax    DVT Ppx - Patient Lovenox on  transfer.  ____________________________________    T. French John MD/PhD  [facility]   [facility]   ____________________________________    Total time:  50 minutes. Time spent included pre-rounding review of vitals and tests, unit/floor time, face-to-face time with the patient including physical examination, care coordination, counseling of patient and/or family, ordering medications/procedures/tests, discussion with CM, PT, OT, SLP and/or other healthcare providers, and documentation in the electronic medical record. Topics discussed included discharge planning, UTI, switch to macrobid, and recheck AM labs. Patient was discussed separately in IDT today; please see details above.

## 2023-06-16 ENCOUNTER — APPOINTMENT (OUTPATIENT)
Dept: OCCUPATIONAL THERAPY | Facility: REHABILITATION | Age: 88
DRG: 560 | End: 2023-06-16
Attending: PHYSICAL MEDICINE & REHABILITATION
Payer: MEDICARE

## 2023-06-16 ENCOUNTER — APPOINTMENT (OUTPATIENT)
Dept: SPEECH THERAPY | Facility: REHABILITATION | Age: 88
DRG: 560 | End: 2023-06-16
Attending: PHYSICAL MEDICINE & REHABILITATION
Payer: MEDICARE

## 2023-06-16 ENCOUNTER — APPOINTMENT (OUTPATIENT)
Dept: PHYSICAL THERAPY | Facility: REHABILITATION | Age: 88
DRG: 560 | End: 2023-06-16
Attending: PHYSICAL MEDICINE & REHABILITATION
Payer: MEDICARE

## 2023-06-16 LAB
ANION GAP SERPL CALC-SCNC: 8 MMOL/L (ref 7–16)
BACTERIA UR CULT: ABNORMAL
BACTERIA UR CULT: ABNORMAL
BASOPHILS # BLD AUTO: 0.6 % (ref 0–1.8)
BASOPHILS # BLD: 0.04 K/UL (ref 0–0.12)
BUN SERPL-MCNC: 11 MG/DL (ref 8–22)
CALCIUM SERPL-MCNC: 9.4 MG/DL (ref 8.5–10.5)
CHLORIDE SERPL-SCNC: 101 MMOL/L (ref 96–112)
CO2 SERPL-SCNC: 29 MMOL/L (ref 20–33)
CREAT SERPL-MCNC: 0.72 MG/DL (ref 0.5–1.4)
EOSINOPHIL # BLD AUTO: 0.34 K/UL (ref 0–0.51)
EOSINOPHIL NFR BLD: 5.4 % (ref 0–6.9)
ERYTHROCYTE [DISTWIDTH] IN BLOOD BY AUTOMATED COUNT: 48.4 FL (ref 35.9–50)
GFR SERPLBLD CREATININE-BSD FMLA CKD-EPI: 81 ML/MIN/1.73 M 2
GLUCOSE SERPL-MCNC: 98 MG/DL (ref 65–99)
HCT VFR BLD AUTO: 40.9 % (ref 37–47)
HGB BLD-MCNC: 13 G/DL (ref 12–16)
IMM GRANULOCYTES # BLD AUTO: 0.02 K/UL (ref 0–0.11)
IMM GRANULOCYTES NFR BLD AUTO: 0.3 % (ref 0–0.9)
LYMPHOCYTES # BLD AUTO: 2.25 K/UL (ref 1–4.8)
LYMPHOCYTES NFR BLD: 35.8 % (ref 22–41)
MAGNESIUM SERPL-MCNC: 2.4 MG/DL (ref 1.5–2.5)
MCH RBC QN AUTO: 30.2 PG (ref 27–33)
MCHC RBC AUTO-ENTMCNC: 31.8 G/DL (ref 32.2–35.5)
MCV RBC AUTO: 94.9 FL (ref 81.4–97.8)
MONOCYTES # BLD AUTO: 0.67 K/UL (ref 0–0.85)
MONOCYTES NFR BLD AUTO: 10.7 % (ref 0–13.4)
NEUTROPHILS # BLD AUTO: 2.96 K/UL (ref 1.82–7.42)
NEUTROPHILS NFR BLD: 47.2 % (ref 44–72)
NRBC # BLD AUTO: 0 K/UL
NRBC BLD-RTO: 0 /100 WBC (ref 0–0.2)
PLATELET # BLD AUTO: 232 K/UL (ref 164–446)
PMV BLD AUTO: 10.3 FL (ref 9–12.9)
POTASSIUM SERPL-SCNC: 4.7 MMOL/L (ref 3.6–5.5)
RBC # BLD AUTO: 4.31 M/UL (ref 4.2–5.4)
SIGNIFICANT IND 70042: ABNORMAL
SITE SITE: ABNORMAL
SODIUM SERPL-SCNC: 138 MMOL/L (ref 135–145)
SOURCE SOURCE: ABNORMAL
WBC # BLD AUTO: 6.3 K/UL (ref 4.8–10.8)

## 2023-06-16 PROCEDURE — 700111 HCHG RX REV CODE 636 W/ 250 OVERRIDE (IP): Performed by: PHYSICAL MEDICINE & REHABILITATION

## 2023-06-16 PROCEDURE — 92523 SPEECH SOUND LANG COMPREHEN: CPT

## 2023-06-16 PROCEDURE — 94760 N-INVAS EAR/PLS OXIMETRY 1: CPT

## 2023-06-16 PROCEDURE — 97110 THERAPEUTIC EXERCISES: CPT

## 2023-06-16 PROCEDURE — 83735 ASSAY OF MAGNESIUM: CPT

## 2023-06-16 PROCEDURE — 97535 SELF CARE MNGMENT TRAINING: CPT

## 2023-06-16 PROCEDURE — 99232 SBSQ HOSP IP/OBS MODERATE 35: CPT | Performed by: PHYSICAL MEDICINE & REHABILITATION

## 2023-06-16 PROCEDURE — 80048 BASIC METABOLIC PNL TOTAL CA: CPT

## 2023-06-16 PROCEDURE — 770010 HCHG ROOM/CARE - REHAB SEMI PRIVAT*

## 2023-06-16 PROCEDURE — 700102 HCHG RX REV CODE 250 W/ 637 OVERRIDE(OP): Performed by: PHYSICAL MEDICINE & REHABILITATION

## 2023-06-16 PROCEDURE — A9270 NON-COVERED ITEM OR SERVICE: HCPCS | Performed by: PHYSICAL MEDICINE & REHABILITATION

## 2023-06-16 PROCEDURE — 36415 COLL VENOUS BLD VENIPUNCTURE: CPT

## 2023-06-16 PROCEDURE — 85025 COMPLETE CBC W/AUTO DIFF WBC: CPT

## 2023-06-16 PROCEDURE — 97530 THERAPEUTIC ACTIVITIES: CPT

## 2023-06-16 RX ADMIN — OXYCODONE HYDROCHLORIDE 10 MG: 10 TABLET ORAL at 14:50

## 2023-06-16 RX ADMIN — TRAZODONE HYDROCHLORIDE 50 MG: 50 TABLET ORAL at 21:33

## 2023-06-16 RX ADMIN — OXYCODONE HYDROCHLORIDE 10 MG: 10 TABLET ORAL at 17:51

## 2023-06-16 RX ADMIN — NITROFURANTOIN MONOHYDRATE/MACROCRYSTALLINE 100 MG: 25; 75 CAPSULE ORAL at 09:07

## 2023-06-16 RX ADMIN — OMEPRAZOLE 20 MG: 20 CAPSULE, DELAYED RELEASE ORAL at 09:10

## 2023-06-16 RX ADMIN — OXYCODONE HYDROCHLORIDE 10 MG: 10 TABLET ORAL at 11:45

## 2023-06-16 RX ADMIN — SENNOSIDES AND DOCUSATE SODIUM 2 TABLET: 50; 8.6 TABLET ORAL at 21:33

## 2023-06-16 RX ADMIN — NITROFURANTOIN MONOHYDRATE/MACROCRYSTALLINE 100 MG: 25; 75 CAPSULE ORAL at 17:51

## 2023-06-16 RX ADMIN — OXYCODONE HYDROCHLORIDE 10 MG: 10 TABLET ORAL at 01:13

## 2023-06-16 RX ADMIN — SENNOSIDES AND DOCUSATE SODIUM 2 TABLET: 50; 8.6 TABLET ORAL at 09:07

## 2023-06-16 RX ADMIN — OXYCODONE HYDROCHLORIDE 10 MG: 10 TABLET ORAL at 09:06

## 2023-06-16 RX ADMIN — ENOXAPARIN SODIUM 40 MG: 100 INJECTION SUBCUTANEOUS at 17:50

## 2023-06-16 RX ADMIN — POLYETHYLENE GLYCOL 3350 1 PACKET: 17 POWDER, FOR SOLUTION ORAL at 09:07

## 2023-06-16 RX ADMIN — OXYCODONE HYDROCHLORIDE 10 MG: 10 TABLET ORAL at 21:52

## 2023-06-16 ASSESSMENT — ACTIVITIES OF DAILY LIVING (ADL)
TOILET_TRANSFER_DESCRIPTION: GRAB BAR;INCREASED TIME;INITIAL PREPARATION FOR TASK;SET-UP OF EQUIPMENT;SUPERVISION FOR SAFETY;VERBAL CUEING
TOILETING_LEVEL_OF_ASSIST_DESCRIPTION: ASSIST TO PULL PANTS UP;ASSIST FOR STANDING BALANCE;GRAB BAR;INCREASED TIME;INITIAL PREPARATION FOR TASK;SET-UP OF EQUIPMENT;SUPERVISION FOR SAFETY;VERBAL CUEING
BED_CHAIR_WHEELCHAIR_TRANSFER_DESCRIPTION: INCREASED TIME;INITIAL PREPARATION FOR TASK;SET-UP OF EQUIPMENT;SUPERVISION FOR SAFETY;VERBAL CUEING
BED_CHAIR_WHEELCHAIR_TRANSFER_DESCRIPTION: INCREASED TIME;INITIAL PREPARATION FOR TASK;SET-UP OF EQUIPMENT;SUPERVISION FOR SAFETY;VERBAL CUEING

## 2023-06-16 ASSESSMENT — PAIN DESCRIPTION - PAIN TYPE
TYPE: ACUTE PAIN

## 2023-06-16 ASSESSMENT — PAIN SCALES - WONG BAKER
WONGBAKER_NUMERICALRESPONSE: DOESN'T HURT AT ALL
WONGBAKER_NUMERICALRESPONSE: HURTS A WHOLE LOT
WONGBAKER_NUMERICALRESPONSE: DOESN'T HURT AT ALL
WONGBAKER_NUMERICALRESPONSE: HURTS A WHOLE LOT
WONGBAKER_NUMERICALRESPONSE: DOESN'T HURT AT ALL
WONGBAKER_NUMERICALRESPONSE: DOESN'T HURT AT ALL

## 2023-06-16 NOTE — THERAPY
Occupational Therapy  Daily Treatment     Patient Name: Chava Bray  Age:  87 y.o., Sex:  female  Medical Record #: [id number]  Today's Date: 6/16/2023     Precautions  Precautions: Fall Risk, Posterior Hip Precautions, Weight Bearing As Tolerated Left Lower Extremity  Comments: hx dementia, impaired safety awareness    Subjective    Pt pleasant and willing to participate in OT    During 2nd OT session, pt had some initial agitation. Pt stated she wanted to go home and needed to be home before midnight. Provided encouragement to participate.     Objective     06/16/23 0931 06/16/23 1101   OT Charge Group   Charges Yes Yes   OT Self Care / ADL (Units) 2  --    OT Therapy Activity (Units)  --  1   OT Therapeutic Exercise (Units)  --  1   OT Total Time Spent   OT Individual Total Time Spent (Mins) 30 30   Precautions   Precautions Fall Risk;Posterior Hip Precautions;Weight Bearing As Tolerated Left Lower Extremity Fall Risk;Posterior Hip Precautions;Weight Bearing As Tolerated Left Lower Extremity   Comments hx dementia, impaired safety awareness hx dementia, impaired safety awareness   Vitals   O2 Delivery Device None - Room Air None - Room Air   Pain   Intervention Medication (see MAR) Medication (see MAR)   Pain 0 - 10 Group   Location Hip Hip   Location Orientation Left Left   Cognition    Level of Consciousness Alert Alert   ABS (Agitated Behavior Scale)   Agitated Behavior Scale Performed Yes Yes   Short Attention Span, Easy Distractibility, Inability to Concentrate 2 2   Impulsive, Impatient, Low Tolerance for Pain or Frustration 1 2   Uncooperative, Resistant to Care, Demanding 1 2   Violent and/or Threatening Violence Toward People or Property 1 1   Explosive and/or Unpredictable Anger 1 1   Rocking, Rubbing, Moaning, Other Self-Stimulating Behavior 1 1   Pulling at Tubes, Restraints, etc. 1 1   Wandering from Treatment Area 1 1   Restlessness, Pacing, Excessive Movement 1 1   Repetitive Behaviors, Motor  and/or Verbal 1 2   Rapid, Loud or Excessive Talking 1 1   Sudden Changes of Mood 1 1   Easily Initiated - Excessive Crying and/or Laughter 1 1   Self-Abusiveness, Physical and/or Verbal 1 1   Agitated Behavior Scale Total Score 15 18   Level of Severity No Agitation No Agitation   Sleep/Wake Cycle   Sleep & Rest Awake;Out of bed Awake;Out of bed   Functional Level of Assist   Grooming Minimal Assist;Seated  --    Grooming Description Increased time;Initial preparation for task;Seated in wheelchair at sink;Set-up of equipment;Supervision for safety  (Pt able to brush hair; provided assist to put hair in ponytail)  --    Toileting Minimal Assist  --    Toileting Description Assist to pull pants up;Assist for standing balance;Grab bar;Increased time;Initial preparation for task;Set-up of equipment;Supervision for safety;Verbal cueing  --    Bed, Chair, Wheelchair Transfer Minimal Assist Minimal Assist   Bed Chair Wheelchair Transfer Description Increased time;Initial preparation for task;Set-up of equipment;Supervision for safety;Verbal cueing Increased time;Initial preparation for task;Set-up of equipment;Supervision for safety;Verbal cueing   Toilet Transfers Contact Guard Assist  --    Toilet Transfer Description Grab bar;Increased time;Initial preparation for task;Set-up of equipment;Supervision for safety;Verbal cueing  --    Sitting Upper Body Exercises   Upper Extremity Bike  --  Level 4 Resistance  (Pt tolerated 15 minutes w/o rest breaks)   Bed Mobility    Sit to Supine  --  Minimal Assist   Scooting  --  Standby Assist   Rolling  --  Standby Assist   Interdisciplinary Plan of Care Collaboration   IDT Collaboration with  Nursing;Physical Therapist Nursing   Patient Position at End of Therapy Seated;Chair Alarm On;Self Releasing Lap Belt Applied;Call Light within Reach;Tray Table within Reach;Phone within Reach In Bed;Bed Alarm On;Call Light within Reach;Tray Table within Reach;Phone within Reach    Collaboration Comments Reported pain to RN; PT pass off CLOF   Strengths & Barriers   Strengths Motivated for self care and independence;Pleasant and cooperative;Supportive family;Willingly participates in therapeutic activities Motivated for self care and independence;Pleasant and cooperative;Supportive family;Willingly participates in therapeutic activities   Barriers Decreased endurance;Fatigue;Generalized weakness;Home accessibility;Impaired activity tolerance;Impaired balance;Impaired carryover of learning;Impaired insight/denial of deficits;Impaired functional cognition;Limited mobility;Pain;Pain poorly managed Decreased endurance;Fatigue;Generalized weakness;Home accessibility;Impaired activity tolerance;Impaired balance;Impaired carryover of learning;Impaired insight/denial of deficits;Impaired functional cognition;Limited mobility;Pain;Pain poorly managed     Assessment    Pt seen for two AM tx, addressing UE exercise, toileting, grooming, and functional transfers. Pt tolerated activity well, even w/ c/o pain. Pt required decreased assist for ADLs. Pt progressing towards goals. Continue OT POC.    Strengths: Motivated for self care and independence, Pleasant and cooperative, Supportive family, Willingly participates in therapeutic activities    Barriers: Decreased endurance, Fatigue, Generalized weakness, Home accessibility, Impaired activity tolerance, Impaired balance, Impaired carryover of learning, Impaired insight/denial of deficits, Impaired functional cognition, Limited mobility, Pain, Pain poorly managed    Plan    ADLs w/ AE education as able, functional mobility/transfers, strength/endurance, balance, functional cog. Contact SO re: bathroom set up/equipment needs.       Passport items to be completed:  Perform bathroom transfers, complete dressing, complete feeding, get ready for the day, prepare a simple meal, participate in household tasks, adapt home for safety needs, demonstrate home  exercise program, complete caregiver training     Occupational Therapy Goals (Active)       Problem: Bathing       Dates: Start:  06/13/23         Goal: STG-Within one week, patient will bathe with min A using AE as needed to maintain hip prec.        Dates: Start:  06/13/23    Expected End:  06/30/23               Problem: Dressing       Dates: Start:  06/13/23         Goal: STG-Within one week, patient will dress LB with min A using AE as needed to maintain hip prec.        Dates: Start:  06/13/23    Expected End:  06/30/23               Problem: OT Long Term Goals       Dates: Start:  06/13/23         Goal: LTG-By discharge, patient will complete basic self care tasks at supv to mod I level using AE as needed to maintain hip prec.        Dates: Start:  06/13/23    Expected End:  06/30/23            Goal: LTG-By discharge, patient will perform bathroom transfers at supv to mod I level using AD/DME as needed.        Dates: Start:  06/13/23    Expected End:  06/30/23               Problem: Toileting       Dates: Start:  06/13/23         Goal: STG-Within one week, patient will complete toileting tasks with min A using AE as needed.        Dates: Start:  06/13/23    Expected End:  06/30/23

## 2023-06-16 NOTE — FLOWSHEET NOTE
06/16/23 0724   Events/Summary/Plan   Events/Summary/Plan 02 pulse ox check   Vital Signs   Pulse 68   Respiration 18   Pulse Oximetry 90 %   $ Pulse Oximetry (Spot Check) Yes   Respiratory Assessment   Level of Consciousness Alert   Chest Exam   Work Of Breathing / Effort Shallow   Oxygen   O2 (LPM) 1   O2 Delivery Device Silicone Nasal Cannula

## 2023-06-16 NOTE — THERAPY
Speech Language Pathology   Initial Assessment     Patient Name: Chava Bray  AGE:  87 y.o., SEX:  female  Medical Record #: [id number]  Today's Date: 6/16/2023     Subjective    Pt pleasant and cooperative during evaluation.  Per MD, pt is experiencing an UTI and has dementia with unknown severity.  Cognitive evaluation ordered to obtain baseline and for family training.       Objective       06/16/23 1401   Evaluation Charges   SLP Speech Language Evaluation Speech Sound Language Comprehension   SLP Total Time Spent   SLP Individual Total Time Spent (Mins) 60   Prior Living Situation   Prior Services Home-Independent   Housing / Facility 3 Story House   Lives with - Patient's Self Care Capacity Significant Other   Prior Level Of Function   Communication   (baseline dementia of unknown severity per chart)   Hearing Within Functional Limits for Evaluation   Vision Wears Corrective Lenses;Reading    Occupation (Pre-Hospital Vocational) Retired Due To Age   Comments Real estate business owner   Cognition   Moderate Attention Severe (2)   Verbal Short Term Memory 5 Minutes   Speech Mechanisms / Voice Production   Speech Mechanisms / Voice Production (WDL) WDL   Labial Function   Labial Function (WDL) WDL   Lingual Function   Lingual Function (WDL) WDL   Outcome Measures   Outcome Measures Utilized SCCAN   SCCAN (Scales of Cognitive and Communicative Ability for Neurorehabilitation)   Oral Expression - Raw Score 12   Oral Expression - Scale Performance Score 63   Orientation - Raw Score 9   Orientation - Scale Performance Score 75   Problem List   Problem List Cognitive-Linguistic Deficits   Current Discharge Plan   Current Discharge Plan Return to Prior Living Situation   Benefit   Therapy Benefit Patient would benefit from Inpatient Rehab Speech-Language Pathology to address above identified deficits.   Interdisciplinary Plan of Care Collaboration   IDT Collaboration with  Physician   Collaboration Comments  baseline status   Strengths & Barriers   Strengths Willingly participates in therapeutic activities;Pleasant and cooperative;Effective communication skills   Barriers Generalized weakness;Impaired carryover of learning;Impaired insight/denial of deficits   Speech Language Pathologist Assigned   Assigned SLP / Extension CL/MP 60 cog         Assessment    Patient is 87 y.o. female with a diagnosis of left displaced femoral neck.  Additional factors influencing patient status/progress (ie: cognitive factors, co-morbidities, social support, etc): Speech language evaluation completed this day.  Pt presented with severe deficits in attn, recall, and mild-moderate deficits in orientation/word finding.  Unable to complete SCCAN due to time constraint.  Pt benefits from additional processing time.  .      Plan  Recommend Speech Therapy 30-60 minutes per day 5-6 days per week for 4 weeks for the following treatments:  SLP Aphasia Evaluation, SLP Cognitive Skill Development, and SLP Group Treatment.    Passport items to be completed:  Express basic needs, understand food/liquid recommendations, consistently follow swallow precautions, manage finances, manage medications, arrive to therapy appointments on time, complete daily memory log entries, solve problems related to safety situations, review education related to hospitalization, complete caregiver training     Goals:  Long term and short term goals have been discussed with patient and they are in agreement.    Speech Therapy Problems (Active)       Problem: Problem Solving STGs       Dates: Start:  06/16/23         Goal: STG-Within one week, patient will complete SCCAN with additional goals as appropriate.        Dates: Start:  06/16/23            Goal: STG-Within one week, patient will answer basic problem solving questions with 70% accuracy given mod verbal cues.         Dates: Start:  06/16/23               Problem: Speech/Swallowing LTGs       Dates: Start:  06/16/23          Goal: LTG-By discharge, patient will solve basic problems Miki for safe discharge       Dates: Start:  06/16/23

## 2023-06-16 NOTE — THERAPY
"Physical Therapy   Daily Treatment     Patient Name: Chava Bray  Age:  87 y.o., Sex:  female  Medical Record #: [id number]  Today's Date: 6/16/2023     Precautions  Precautions: Fall Risk, Posterior Hip Precautions, Weight Bearing As Tolerated Left Lower Extremity  Comments: hx dementia, impaired safety awareness    Subjective    Patient is found seated up in chair in dining room, states she didn't eat breakfast. When informed she needs to eat to help her body heal, she replies \"it was Khmer toast, how is that going to help me get better, I normally don't eat breakfast anyway\". Was able to recall 1/3 hip precautions, was inconsistent which ones she remembered as we reviewed throughout session. Perseverative on where her purse is, requests several times to go to her room and find it. Was initially able to be distracted, bargained with her to go look for it after she received pain meds. Purse was in room, she was pleased and willing to keep working with physical therapy.      Objective       06/16/23 0831   PT Charge Group   PT Therapeutic Exercise (Units) 4   PT Total Time Spent   PT Individual Total Time Spent (Mins) 60   Sitting Lower Body Exercises   Ankle Pumps 2 sets of 10;Bilateral   Hip Abduction 1 set of 10;Bilateral   Long Arc Quad 1 set of 10;Bilateral  (cues to not hip flex with knee extension)   Hamstring Curl 1 set of 10;Bilateral;Light Resistance Theraband  (max assist to prevent hip flexion with hamstring curl, unable to perform hamstring curl without hip flexion/ IR and adduction)   Sit to Stand 1 set of 10   Standing Lower Body Exercises   Marching 1 set of 10   Heel Rise 1 set of 10;Bilateral   Other Exercises static standing unsupported with feet apart and feet semi tandem, limited by pain. RN provided pain meds at 0910.   Interdisciplinary Plan of Care Collaboration   IDT Collaboration with  Nursing;Occupational Therapist   Patient Position at End of Therapy Seated;Chair Alarm On;Self " Releasing Lap Belt Applied;Call Light within Reach;Tray Table within Reach;Phone within Reach   Collaboration Comments handoff to OT. RN provided pain meds at 0910.         Assessment    Patient seems more cognitively clear today, although perseverative on where her purse was located. Amount and intensity of activity was limited by pain, RN provided meds most the way through session at 0910. When pain is managed ahead of therapy session, she is able to participate more fully.     Strengths: Independent prior level of function, Pleasant and cooperative  Barriers: Constipation, Decreased endurance, Fatigue, Generalized weakness, Impaired activity tolerance, Impaired balance, Impaired carryover of learning, Impaired insight/denial of deficits, Impaired functional cognition, Limited mobility, Pain    Plan    Gait, cardiovascular endurance, review hip precautions, strengthening in supine/ seated/ standing, balance/ perform Cameron as able    Passport items to be completed:  Get in/out of bed safely, in/out of a vehicle, safely use mobility device, walk or wheel around home/community, navigate up and down stairs, show how to get up/down from the ground, ensure home is accessible, demonstrate HEP, complete caregiver training    Physical Therapy Problems (Active)       Problem: Balance       Dates: Start:  06/13/23         Goal: STG-Within one week, patient will improve standing balance to tolerate static standing x 1 min on firm surface with contact guard assist while performing reaching task       Dates: Start:  06/13/23    Expected End:  06/30/23               Problem: Mobility       Dates: Start:  06/13/23         Goal: STG-Within one week, patient will ambulate 100 ft with FWW and contact guard assist       Dates: Start:  06/13/23    Expected End:  06/30/23            Goal: STG-Within one week, patient will ambulate up/down a 6 inch curb with FWW and min assist       Dates: Start:  06/13/23    Expected End:  06/30/23                Problem: Mobility Transfers       Dates: Start:  06/13/23         Goal: STG-Within one week, patient will perform bed mobility from flat bed with railing with contact guard assist       Dates: Start:  06/13/23    Expected End:  06/30/23            Goal: STG-Within one week, patient will teach back fall recovery with minimal cuing (limited ability to actually perform due to posterior hip precautions)       Dates: Start:  06/13/23    Expected End:  06/30/23               Problem: PT-Long Term Goals       Dates: Start:  06/13/23         Goal: LTG-By discharge, patient will transfer one surface to another with FWW and supervision assist       Dates: Start:  06/13/23    Expected End:  06/30/23            Goal: LTG-By discharge, patient will perform home exercise program from handouts with set up assist       Dates: Start:  06/13/23    Expected End:  06/30/23            Goal: LTG-By discharge, patient will ambulate up/down flight of stairs with bilateral railing and supervision assist       Dates: Start:  06/13/23    Expected End:  06/30/23            Goal: LTG-By discharge, patient will transfer in/out of a car with supervision assist and FWW       Dates: Start:  06/13/23    Expected End:  06/30/23            Goal: LTG-By discharge, patient will teach back hip precautions 100% with no cuing       Dates: Start:  06/13/23    Expected End:  06/30/23

## 2023-06-16 NOTE — FLOWSHEET NOTE
06/16/23 0845   Events/Summary/Plan   Events/Summary/Plan RA pulse ox check. Sitting in wheelchair. Therapy at the side of wheelchair   Vital Signs   Pulse 72   Respiration 16   Pulse Oximetry 92 %  (earclip pulse ox reading)   $ Pulse Oximetry (Spot Check) Yes   Respiratory Assessment   Level of Consciousness Alert   Chest Exam   Work Of Breathing / Effort Shallow   Oxygen   O2 Delivery Device Room air w/o2 available

## 2023-06-16 NOTE — DISCHARGE PLANNING
CM met with patient to update on IDT and DC date of 6/26/23.  Answered questions.  CM will continue to monitor for DC needs.

## 2023-06-16 NOTE — CARE PLAN
"The patient is Stable - Low risk of patient condition declining or worsening    Shift Goals  Clinical Goals: fall prevention, pain control  Patient Goals: pain control, rest  Family Goals: none present    Problem: Fall Risk - Rehab  Goal: Patient will remain free from falls  Outcome: Progressing  Note: Brianna Brady Fall risk Assessment Score: 21    High fall risk Interventions   - Alarming seatbelt  - Bed and strip alarm   - Yellow sign by the door   - Yellow wrist band \"Fall risk\"  - Room near to the nurse station  - Do not leave patient unattended in the bathroom  - Fall risk education provided     Problem: Skin Integrity  Goal: Patient's skin integrity will be maintained or improve  Outcome: Progressing  Note:   Reinaldo Score: 17    Patient's skin remains intact and free from new or accidental injury this shift; no s/s of infection. RN wound protocol checked. Encouraged hydration and educated about the importance of nutrition to keep skin integrity. Will continue to monitor.       "

## 2023-06-16 NOTE — PROGRESS NOTES
"  Physical Medicine & Rehabilitation Progress Note    Encounter Date: 6/16/2023    Chief Complaint: Decreased mobility, constipation    Interval Events (Subjective):  Patient sitting up in room. She reports therapy is going OK. She does not remember what she worked on this morning. Denies SOB.     _____________________________________  Interdisciplinary Team Conference   Most recent IDT on 6/15/2023    Discharge Date/Disposition:  6/26/23  _____________________________________      Objective:  VITAL SIGNS: BP (!) 89/64   Pulse 72   Temp 36.5 °C (97.7 °F) (Oral)   Resp 16   Ht 1.6 m (5' 3\")   Wt 52.2 kg (115 lb)   SpO2 92% Comment: earclip pulse ox reading  BMI 20.37 kg/m²   Gen: NAD  Psych: Mood and affect appropriate  CV: RRR, 0 edema  Resp: CTAB, no upper airway sounds  Abd: NTND  Neuro: AOx2, following commands  Unchanged from 6/15/23    Laboratory Values:  Recent Results (from the past 72 hour(s))   URINE CULTURE-EXISTING-LESS THAN 48 HOURS    Collection Time: 06/14/23  5:05 AM    Specimen: Urine, Straight Cath   Result Value Ref Range    Significant Indicator POS (POS)     Source UR     Site URINE, STRAIGHT CATH     Culture Result - (A)     Culture Result Escherichia coli  >100,000 cfu/mL   (A)        Susceptibility    Escherichia coli - GIOVANNI     Ampicillin >16 Resistant mcg/mL     Ceftriaxone <=1 Sensitive mcg/mL     Cefazolin* 4 Sensitive mcg/mL      * Breakpoints when Cefazolin is used for therapy of infections  other than uncomplicated UTIs due to Enterobacterales are as  follows:  GIOVANNI and Interpretation:  <=2 S  4 I  >=8 R       Ciprofloxacin 0.5 Intermediate mcg/mL     Cefepime <=2 Sensitive mcg/mL     Cefuroxime <=4 Sensitive mcg/mL     Ampicillin/sulbactam 16/8 Intermediate mcg/mL     Tobramycin <=2 Sensitive mcg/mL     Nitrofurantoin <=32 Sensitive mcg/mL     Gentamicin <=2 Sensitive mcg/mL     Levofloxacin <=0.5 Sensitive mcg/mL     Minocycline <=4 Sensitive mcg/mL     Pip/Tazobactam <=8 " Sensitive mcg/mL     Trimeth/Sulfa <=0.5/9.5 Sensitive mcg/mL     Tigecycline <=2 Sensitive mcg/mL   URINALYSIS    Collection Time: 06/14/23  5:55 AM   Result Value Ref Range    Color Yellow     Character Turbid (A)     Specific Gravity 1.010 <1.035    Ph 6.5 5.0 - 8.0    Glucose Negative Negative mg/dL    Ketones Negative Negative mg/dL    Protein 30 (A) Negative mg/dL    Bilirubin Negative Negative    Urobilinogen, Urine 1.0 Negative    Nitrite Negative Negative    Leukocyte Esterase Large (A) Negative    Occult Blood Trace (A) Negative    Micro Urine Req Microscopic    URINE MICROSCOPIC (W/UA)    Collection Time: 06/14/23  5:55 AM   Result Value Ref Range    WBC Packed (A) /hpf    RBC 0-2 /hpf    Bacteria Moderate (A) None /hpf    Epithelial Cells Negative /hpf    Hyaline Cast 0-2 /lpf   CBC WITH DIFFERENTIAL    Collection Time: 06/16/23  5:47 AM   Result Value Ref Range    WBC 6.3 4.8 - 10.8 K/uL    RBC 4.31 4.20 - 5.40 M/uL    Hemoglobin 13.0 12.0 - 16.0 g/dL    Hematocrit 40.9 37.0 - 47.0 %    MCV 94.9 81.4 - 97.8 fL    MCH 30.2 27.0 - 33.0 pg    MCHC 31.8 (L) 32.2 - 35.5 g/dL    RDW 48.4 35.9 - 50.0 fL    Platelet Count 232 164 - 446 K/uL    MPV 10.3 9.0 - 12.9 fL    Neutrophils-Polys 47.20 44.00 - 72.00 %    Lymphocytes 35.80 22.00 - 41.00 %    Monocytes 10.70 0.00 - 13.40 %    Eosinophils 5.40 0.00 - 6.90 %    Basophils 0.60 0.00 - 1.80 %    Immature Granulocytes 0.30 0.00 - 0.90 %    Nucleated RBC 0.00 0.00 - 0.20 /100 WBC    Neutrophils (Absolute) 2.96 1.82 - 7.42 K/uL    Lymphs (Absolute) 2.25 1.00 - 4.80 K/uL    Monos (Absolute) 0.67 0.00 - 0.85 K/uL    Eos (Absolute) 0.34 0.00 - 0.51 K/uL    Baso (Absolute) 0.04 0.00 - 0.12 K/uL    Immature Granulocytes (abs) 0.02 0.00 - 0.11 K/uL    NRBC (Absolute) 0.00 K/uL   Basic Metabolic Panel    Collection Time: 06/16/23  5:47 AM   Result Value Ref Range    Sodium 138 135 - 145 mmol/L    Potassium 4.7 3.6 - 5.5 mmol/L    Chloride 101 96 - 112 mmol/L    Co2 29 20  - 33 mmol/L    Glucose 98 65 - 99 mg/dL    Bun 11 8 - 22 mg/dL    Creatinine 0.72 0.50 - 1.40 mg/dL    Calcium 9.4 8.5 - 10.5 mg/dL    Anion Gap 8.0 7.0 - 16.0   MAGNESIUM    Collection Time: 06/16/23  5:47 AM   Result Value Ref Range    Magnesium 2.4 1.5 - 2.5 mg/dL   ESTIMATED GFR    Collection Time: 06/16/23  5:47 AM   Result Value Ref Range    GFR (CKD-EPI) 81 >60 mL/min/1.73 m 2       Medications:  Scheduled Medications   Medication Dose Frequency    nitrofurantoin  100 mg BID WITH MEALS    senna-docusate  2 Tablet BID    And    polyethylene glycol/lytes  1 Packet DAILY    Pharmacy Consult Request  1 Each PHARMACY TO DOSE    omeprazole  20 mg DAILY    enoxaparin (LOVENOX) injection  40 mg DAILY AT 1800     PRN medications: senna-docusate **AND** polyethylene glycol/lytes **AND** magnesium hydroxide **AND** bisacodyl, Respiratory Therapy Consult, hydrALAZINE, acetaminophen, mag hydrox-al hydrox-simeth, ondansetron **OR** ondansetron, traZODone, sodium chloride, oxyCODONE immediate-release **OR** oxyCODONE immediate-release    Diet:  Current Diet Order   Procedures    Diet Order Diet: Regular       Medical Decision Making and Plan:  Left hip fracture - Patient with mechanical fall at home with left hip fracture s/p hemiarthroplasty on 6/9/23 with Dr. Beckre. Patient is WBAT, posterior precautions.   -PT and OT for mobility and ADLs. Per guidelines, 15 hours per week between PT, OT and/or SLP.  -Follow-up Dr. Becker     HTN - Previously on Lisinopril but low SBP after fall. Will monitor. Into 110s, will monitor     Azotemia - Worsening after surgery. Check AM CMP - improved to 15     Hypothyroidism - Patient was on Levothyroxine in the past. Currently not on it. Will check TSH wnl      Neuropathic pain - Patient with history of neuropathic pain from spinal stenosis. Previously on Gabapentin     Dysuria - Developed dysuria, UA + for UTI. Start Omnicef and send for cultures. PharmD recommending change to Macrobid.  Ucx with E coli, will continue macrobid    Pain - Patient on Tylenol scheduled and PRN oxycodone     Dementia - Does not appear to be on any medications. Will consult SLP and consider start medication for dementia    Skin - Patient at risk for skin breakdown due to debility in areas including sacrum, achilles, elbows and head in addition to other sites. Nursing to assess skin daily.      GI Ppx - Patient on Prilosec for GERD prophylaxis. Patient on Senna-docusate for constipation prophylaxis.   -Severe constipation, mild distention, give Miralax. KUB on 6/13/23 - mild constipation in distal colon. Continue Miralax    DVT Ppx - Patient Lovenox on transfer.  ____________________________________    T. French John MD/PhD  [facility] - Physical Medicine & Rehabilitation   [facility] - Brain Injury Medicine   ____________________________________

## 2023-06-16 NOTE — FLOWSHEET NOTE
06/16/23 0710   Events/Summary/Plan   Events/Summary/Plan RA pulse ox check. States she was given a pain pill last night   Vital Signs   Pulse 67   Respiration 16   Pulse Oximetry (!) 86 %   $ Pulse Oximetry (Spot Check) Yes   Respiratory Assessment   Level of Consciousness Alert   Chest Exam   Work Of Breathing / Effort Shallow   Oxygen   O2 Delivery Device Room air w/o2 available  (placed on 1L 02)

## 2023-06-17 ENCOUNTER — APPOINTMENT (OUTPATIENT)
Dept: SPEECH THERAPY | Facility: REHABILITATION | Age: 88
DRG: 560 | End: 2023-06-17
Attending: PHYSICAL MEDICINE & REHABILITATION
Payer: MEDICARE

## 2023-06-17 PROCEDURE — A9270 NON-COVERED ITEM OR SERVICE: HCPCS | Performed by: PHYSICAL MEDICINE & REHABILITATION

## 2023-06-17 PROCEDURE — 700111 HCHG RX REV CODE 636 W/ 250 OVERRIDE (IP): Performed by: PHYSICAL MEDICINE & REHABILITATION

## 2023-06-17 PROCEDURE — 97129 THER IVNTJ 1ST 15 MIN: CPT

## 2023-06-17 PROCEDURE — 700102 HCHG RX REV CODE 250 W/ 637 OVERRIDE(OP): Performed by: PHYSICAL MEDICINE & REHABILITATION

## 2023-06-17 PROCEDURE — 94760 N-INVAS EAR/PLS OXIMETRY 1: CPT

## 2023-06-17 PROCEDURE — 770010 HCHG ROOM/CARE - REHAB SEMI PRIVAT*

## 2023-06-17 PROCEDURE — 97130 THER IVNTJ EA ADDL 15 MIN: CPT

## 2023-06-17 RX ADMIN — NITROFURANTOIN MONOHYDRATE/MACROCRYSTALLINE 100 MG: 25; 75 CAPSULE ORAL at 17:18

## 2023-06-17 RX ADMIN — TRAZODONE HYDROCHLORIDE 50 MG: 50 TABLET ORAL at 20:32

## 2023-06-17 RX ADMIN — SENNOSIDES AND DOCUSATE SODIUM 2 TABLET: 50; 8.6 TABLET ORAL at 20:32

## 2023-06-17 RX ADMIN — OMEPRAZOLE 20 MG: 20 CAPSULE, DELAYED RELEASE ORAL at 08:27

## 2023-06-17 RX ADMIN — NITROFURANTOIN MONOHYDRATE/MACROCRYSTALLINE 100 MG: 25; 75 CAPSULE ORAL at 08:26

## 2023-06-17 RX ADMIN — POLYETHYLENE GLYCOL 3350 1 PACKET: 17 POWDER, FOR SOLUTION ORAL at 08:25

## 2023-06-17 RX ADMIN — OXYCODONE HYDROCHLORIDE 10 MG: 10 TABLET ORAL at 08:27

## 2023-06-17 RX ADMIN — ENOXAPARIN SODIUM 40 MG: 100 INJECTION SUBCUTANEOUS at 17:18

## 2023-06-17 RX ADMIN — OXYCODONE HYDROCHLORIDE 10 MG: 10 TABLET ORAL at 15:17

## 2023-06-17 ASSESSMENT — PATIENT HEALTH QUESTIONNAIRE - PHQ9
1. LITTLE INTEREST OR PLEASURE IN DOING THINGS: NOT AT ALL
2. FEELING DOWN, DEPRESSED, IRRITABLE, OR HOPELESS: NOT AT ALL
SUM OF ALL RESPONSES TO PHQ9 QUESTIONS 1 AND 2: 0

## 2023-06-17 ASSESSMENT — PAIN DESCRIPTION - PAIN TYPE: TYPE: ACUTE PAIN

## 2023-06-17 NOTE — THERAPY
Speech Language Pathology  Daily Treatment     Patient Name: Chava Bray  Age:  87 y.o., Sex:  female  Medical Record #: [id number]  Today's Date: 6/17/2023     Precautions  Precautions: Fall Risk, Posterior Hip Precautions, Weight Bearing As Tolerated Left Lower Extremity  Comments: hx dementia, impaired safety awareness    Subjective    Patient pleasant and agreeable to ST services this session. Patient complained of pain in her left him multiple times throughout the session; nursing notified.     Objective       06/17/23 1331   Treatment Charges   SLP Cognitive Skill Development First 15 Minutes 1   SLP Cognitive Skill Development Additional 15 Minutes 3   SLP Total Time Spent   SLP Individual Total Time Spent (Mins) 60   SCCAN (Scales of Cognitive and Communicative Ability for Neurorehabilitation)   Oral Expression - Raw Score 12   Oral Expression - Scale Performance Score 63   Orientation - Raw Score 9   Orientation - Scale Performance Score 75   Memory - Raw Score 3   Memory - Scale Performance Score 16   Speech Comprehension - Raw Score 7   Speech Comprehension - Scale Performance Score 54   Reading Comprehension - Raw Score 6   Reading Comprehension - Scale Performance Score 50   Writing - Raw Score 5   Writing - Scale Performance Score 71   Attention - Raw Score 5   Attention - Scale Performance Score 31   Problem Solving - Raw Score 11   Problem Solving - Scale Performance Score 48   SCCAN Total Raw Score 48   SCCAN Degree of Severity Moderate Impairment   Interdisciplinary Plan of Care Collaboration   IDT Collaboration with  Other (See Comments);Nursing  (Care Aide)   Patient Position at End of Therapy Seated;Chair Alarm On;Self Releasing Lap Belt Applied;Call Light within Reach;Tray Table within Reach;Phone within Reach   Collaboration Comments Notified nursing of patients complaints of pain in hip and requrests for her assistance clipping her finger nails.   Speech Language Pathologist Assigned    Assigned SLP / Extension CL/MP 60 cog         Assessment    The Scales of Cognitive and Communicative Ability for Neurorehabilitation (SCCAN) was completed this session. The SCCAN assesses cognitive-communicative deficits and functional ability in patients in rehabilitation hospitals, clinics, and skilled nursing facilities. The SCCAN is appropriate for a broad range of neurological patients, provides a measure of both impairment and functional ability.     SCCAN (Scales of Cognitive and Communicative Ability for Neurorehabilitation)  Oral Expression - Raw Score: (P) 12  Oral Expression - Scale Performance Score: (P) 63  Orientation - Raw Score: (P) 9  Orientation - Scale Performance Score: (P) 75  Memory - Raw Score: (P) 3  Memory - Scale Performance Score: (P) 16  Speech Comprehension - Raw Score: (P) 7  Speech Comprehension - Scale Performance Score: (P) 54  Reading Comprehension - Raw Score: (P) 6  Reading Comprehension - Scale Performance Score: (P) 50  Writing - Raw Score: (P) 5  Writing - Scale Performance Score: (P) 71  Attention - Raw Score: (P) 5  Attention - Scale Performance Score: (P) 31  Problem Solving - Raw Score: (P) 11  Problem Solving - Scale Performance Score: (P) 48  SCCAN Total Raw Score: (P) 48  SCCAN Degree of Severity: (P) Moderate Impairment    The patient's total raw score of 48 is indicative of a moderate cognitive-linguistic impairment. The patient demonstrated relative strengths in orientation, and writing. The patient had challenges with oral expression, memory, speech comprehension, attention, and problem solving. The patient complained of pain in her left hip throughout the session, and her pain appeared to affect her attention to tasks.    Strengths: Willingly participates in therapeutic activities, Pleasant and cooperative, Effective communication skills  Barriers: Generalized weakness, Impaired carryover of learning, Impaired insight/denial of deficits    Plan    Memory;  attention; basic problem solving    Passport items to be completed:  Express basic needs, understand food/liquid recommendations, consistently follow swallow precautions, manage finances, manage medications, arrive to therapy appointments on time, complete daily memory log entries, solve problems related to safety situations, review education related to hospitalization, complete caregiver training     Speech Therapy Problems (Active)       Problem: Problem Solving STGs       Dates: Start:  06/16/23         Goal: STG-Within one week, patient will complete SCCAN with additional goals as appropriate.        Dates: Start:  06/16/23    Expected End:  06/30/23            Goal: STG-Within one week, patient will answer basic problem solving questions with 70% accuracy given mod verbal cues.         Dates: Start:  06/16/23    Expected End:  06/30/23               Problem: Speech/Swallowing LTGs       Dates: Start:  06/16/23         Goal: LTG-By discharge, patient will solve basic problems Miki for safe discharge       Dates: Start:  06/16/23    Expected End:  06/30/23

## 2023-06-17 NOTE — FLOWSHEET NOTE
06/17/23 0949   Events/Summary/Plan   Events/Summary/Plan spot check done pt on room air sitting in chair doing well   Vital Signs   Pulse (!) 40  (notified RN)   Respiration 16   Pulse Oximetry 92 %   $ Pulse Oximetry (Spot Check) Yes   Respiratory Assessment   Respiratory Pattern Within Normal Limits   Level of Consciousness Alert   Chest Exam   Work Of Breathing / Effort Within Normal Limits   Oxygen   O2 (LPM) 0   FiO2% 21 %   O2 Delivery Device Room air w/o2 available

## 2023-06-17 NOTE — CARE PLAN
"The patient is Stable - Low risk of patient condition declining or worsening    Shift Goals  Clinical Goals: fall prevention, pain control  Patient Goals: pain control, rest  Family Goals: none present    Problem: Fall Risk - Rehab  Goal: Patient will remain free from falls  Outcome: Progressing  Note: Brianna Brady Fall risk Assessment Score: 21    High fall risk Interventions   - Alarming seatbelt  - Bed and strip alarm   - Yellow sign by the door   - Yellow wrist band \"Fall risk\"  - Room near to the nurse station  - Do not leave patient unattended in the bathroom  - Fall risk education provided     Problem: Skin Integrity  Goal: Patient's skin integrity will be maintained or improve  Outcome: Progressing  Note:   Reinaldo Score: 21    Patient's skin remains intact and free from new or accidental injury this shift; no s/s of infection. RN wound protocol checked. Encouraged hydration and educated about the importance of nutrition to keep skin integrity. Will continue to monitor.       "

## 2023-06-18 ENCOUNTER — APPOINTMENT (OUTPATIENT)
Dept: PHYSICAL THERAPY | Facility: REHABILITATION | Age: 88
DRG: 560 | End: 2023-06-18
Attending: PHYSICAL MEDICINE & REHABILITATION
Payer: MEDICARE

## 2023-06-18 ENCOUNTER — APPOINTMENT (OUTPATIENT)
Dept: SPEECH THERAPY | Facility: REHABILITATION | Age: 88
DRG: 560 | End: 2023-06-18
Attending: PHYSICAL MEDICINE & REHABILITATION
Payer: MEDICARE

## 2023-06-18 ENCOUNTER — APPOINTMENT (OUTPATIENT)
Dept: OCCUPATIONAL THERAPY | Facility: REHABILITATION | Age: 88
DRG: 560 | End: 2023-06-18
Attending: PHYSICAL MEDICINE & REHABILITATION
Payer: MEDICARE

## 2023-06-18 PROCEDURE — 700102 HCHG RX REV CODE 250 W/ 637 OVERRIDE(OP): Performed by: PHYSICAL MEDICINE & REHABILITATION

## 2023-06-18 PROCEDURE — 97530 THERAPEUTIC ACTIVITIES: CPT

## 2023-06-18 PROCEDURE — 97130 THER IVNTJ EA ADDL 15 MIN: CPT

## 2023-06-18 PROCEDURE — 97129 THER IVNTJ 1ST 15 MIN: CPT

## 2023-06-18 PROCEDURE — 700111 HCHG RX REV CODE 636 W/ 250 OVERRIDE (IP): Performed by: PHYSICAL MEDICINE & REHABILITATION

## 2023-06-18 PROCEDURE — A9270 NON-COVERED ITEM OR SERVICE: HCPCS | Performed by: PHYSICAL MEDICINE & REHABILITATION

## 2023-06-18 PROCEDURE — 94760 N-INVAS EAR/PLS OXIMETRY 1: CPT

## 2023-06-18 PROCEDURE — 770010 HCHG ROOM/CARE - REHAB SEMI PRIVAT*

## 2023-06-18 PROCEDURE — 97535 SELF CARE MNGMENT TRAINING: CPT

## 2023-06-18 RX ADMIN — SENNOSIDES AND DOCUSATE SODIUM 2 TABLET: 50; 8.6 TABLET ORAL at 20:20

## 2023-06-18 RX ADMIN — OMEPRAZOLE 20 MG: 20 CAPSULE, DELAYED RELEASE ORAL at 09:20

## 2023-06-18 RX ADMIN — NITROFURANTOIN MONOHYDRATE/MACROCRYSTALLINE 100 MG: 25; 75 CAPSULE ORAL at 18:10

## 2023-06-18 RX ADMIN — NITROFURANTOIN MONOHYDRATE/MACROCRYSTALLINE 100 MG: 25; 75 CAPSULE ORAL at 09:20

## 2023-06-18 RX ADMIN — OXYCODONE HYDROCHLORIDE 10 MG: 10 TABLET ORAL at 15:27

## 2023-06-18 RX ADMIN — POLYETHYLENE GLYCOL 3350 1 PACKET: 17 POWDER, FOR SOLUTION ORAL at 09:20

## 2023-06-18 RX ADMIN — ENOXAPARIN SODIUM 40 MG: 100 INJECTION SUBCUTANEOUS at 18:10

## 2023-06-18 RX ADMIN — SENNOSIDES AND DOCUSATE SODIUM 2 TABLET: 50; 8.6 TABLET ORAL at 09:20

## 2023-06-18 RX ADMIN — TRAZODONE HYDROCHLORIDE 50 MG: 50 TABLET ORAL at 20:20

## 2023-06-18 RX ADMIN — OXYCODONE HYDROCHLORIDE 10 MG: 10 TABLET ORAL at 05:45

## 2023-06-18 ASSESSMENT — GAIT ASSESSMENTS
DEVIATION: ANTALGIC;DECREASED BASE OF SUPPORT;SHUFFLED GAIT
ASSISTIVE DEVICE: FRONT WHEEL WALKER
DISTANCE (FEET): 25
GAIT LEVEL OF ASSIST: CONTACT GUARD ASSIST

## 2023-06-18 ASSESSMENT — PAIN DESCRIPTION - PAIN TYPE: TYPE: ACUTE PAIN

## 2023-06-18 ASSESSMENT — ACTIVITIES OF DAILY LIVING (ADL)
BED_CHAIR_WHEELCHAIR_TRANSFER_DESCRIPTION: SUPERVISION FOR SAFETY;VERBAL CUEING;SET-UP OF EQUIPMENT;INITIAL PREPARATION FOR TASK;INCREASED TIME

## 2023-06-18 NOTE — THERAPY
Occupational Therapy  Daily Treatment     Patient Name: Chava Bray  Age:  87 y.o., Sex:  female  Medical Record #: [id number]  Today's Date: 6/18/2023     Precautions  Precautions: (P) Fall Risk, Posterior Hip Precautions, Weight Bearing As Tolerated Left Lower Extremity  Comments: (P) Hx Dementia, impaired safety         Subjective    Patient in bed when OT arrived. She stated her  was on his way to pick her up and take her home. OT educated patient  that she was not on the list for discharges. Patient required repeated instructions for getting out of bed.      Objective       06/18/23 1031   OT Charge Group   Charges Yes   OT Self Care / ADL (Units) 1   OT Therapy Activity (Units) 1   OT Total Time Spent   OT Individual Total Time Spent (Mins) 30   Precautions   Precautions Fall Risk;Posterior Hip Precautions;Weight Bearing As Tolerated Left Lower Extremity   Comments Hx Dementia, impaired safety   Pain 0 - 10 Group   Location Hip   Location Orientation Left   Description Aching   Comfort Goal Perform Activity   Therapist Pain Assessment During Activity   Non Verbal Descriptors   Non Verbal Scale  Calm   Functional Level of Assist   Lower Body Dressing Minimal Assist   Lower Body Dressing Description Set-up of equipment;Sock aid;Reacher;Dressing stick;Supervision for safety;Verbal cueing   Bed Mobility    Supine to Sit Contact Guard Assist   Scooting Contact Guard Assist   Interdisciplinary Plan of Care Collaboration   IDT Collaboration with  Certified Nursing Assistant;Family / Caregiver   Patient Position at End of Therapy Seated;Chair Alarm On;Self Releasing Lap Belt Applied;Call Light within Reach;Tray Table within Reach;Family / Friend in Room   Collaboration Comments   ( in room with patient)     Patient instructed in LB dressing task using AE. See above for Levels. Facilitated functional mobility using FWW in room with CGA and verbal cues for safety.     Assessment    Patient tolerated  session fair. She was perseverating on her  taking her home until he arrived. She was able to follow instructions to doff and don socks with verbal cues and assistance for set up and follow through. She completed functional mobility with FWW with CGA and verbal cues for encouragement. Patient c/o pain with the functional mobility. Nursing notified.   Strengths: Motivated for self care and independence, Pleasant and cooperative, Supportive family, Willingly participates in therapeutic activities  Barriers: Decreased endurance, Fatigue, Generalized weakness, Home accessibility, Impaired activity tolerance, Impaired balance, Impaired carryover of learning, Impaired insight/denial of deficits, Impaired functional cognition, Limited mobility, Pain, Pain poorly managed    Plan    ADLs w/ AE education as able, functional mobility/transfers, strength/endurance, balance, functional cog. Contact SO re: bathroom set up/equipment needs.    Passport items to be completed:  Perform bathroom transfers, complete dressing, complete feeding, get ready for the day, prepare a simple meal, participate in household tasks, adapt home for safety needs, demonstrate home exercise program, complete caregiver training     Occupational Therapy Goals (Active)       Problem: Bathing       Dates: Start:  06/13/23         Goal: STG-Within one week, patient will bathe with min A using AE as needed to maintain hip prec.        Dates: Start:  06/13/23    Expected End:  06/30/23               Problem: Dressing       Dates: Start:  06/13/23         Goal: STG-Within one week, patient will dress LB with min A using AE as needed to maintain hip prec.        Dates: Start:  06/13/23    Expected End:  06/30/23               Problem: OT Long Term Goals       Dates: Start:  06/13/23         Goal: LTG-By discharge, patient will complete basic self care tasks at supv to mod I level using AE as needed to maintain hip prec.        Dates: Start:  06/13/23     Expected End:  06/30/23            Goal: LTG-By discharge, patient will perform bathroom transfers at supv to mod I level using AD/DME as needed.        Dates: Start:  06/13/23    Expected End:  06/30/23               Problem: Toileting       Dates: Start:  06/13/23         Goal: STG-Within one week, patient will complete toileting tasks with min A using AE as needed.        Dates: Start:  06/13/23    Expected End:  06/30/23

## 2023-06-18 NOTE — THERAPY
Physical Therapy   Daily Treatment     Patient Name: Chava Bray  Age:  87 y.o., Sex:  female  Medical Record #: [id number]  Today's Date: 6/18/2023     Precautions  Precautions: (P) Fall Risk, Posterior Hip Precautions, Weight Bearing As Tolerated Left Lower Extremity  Comments: (P) hx dementia, impaired safety awareness    Subjective    I have to use the bathroom.     Objective       06/18/23 0931   PT Charge Group   PT Therapeutic Activities (Units) 2   PT Total Time Spent   PT Individual Total Time Spent (Mins) 30   Precautions   Precautions Fall Risk;Posterior Hip Precautions;Weight Bearing As Tolerated Left Lower Extremity   Comments hx dementia, impaired safety awareness   Pain   Intervention Medication (see MAR)   Pain 0 - 10 Group   Location Hip   Location Orientation Left   Pain Rating Scale (NPRS) 5   Description Aching   Comfort Goal Perform Activity   Therapist Pain Assessment Post Activity Pain Same as Prior to Activity   Non Verbal Descriptors   Non Verbal Scale  Calm   Cognition    Orientation Level Not Oriented to Address;Not Oriented to Year;Not Oriented to Place;Not Oriented to Day   Ability To Follow Commands 1 Step   Safety Awareness Impaired   New Learning Impaired   Sequencing Impaired   Comments hx of dementia, repetitive   Sleep/Wake Cycle   Sleep & Rest Resting   Gait Functional Level of Assist    Gait Level Of Assist Contact Guard Assist   Assistive Device Front Wheel Walker   Distance (Feet) 25  (plus 8 ft)   # of Times Distance was Traveled 1   Deviation Antalgic;Decreased Base Of Support;Shuffled Gait   Transfer Functional Level of Assist   Bed, Chair, Wheelchair Transfer Minimal Assist   Bed Chair Wheelchair Transfer Description Supervision for safety;Verbal cueing;Set-up of equipment;Initial preparation for task;Increased time   Toilet Transfers Contact Guard Assist   Toilet Transfer Description Initial preparation for task;Set-up of equipment;Supervision for safety;Verbal  cueing;Grab bar;Increased time   Bed Mobility    Supine to Sit Contact Guard Assist   Sit to Stand Contact Guard Assist   Scooting Minimal Assist   Neuro-Muscular Treatments   Neuro-Muscular Treatments Compensatory Strategies;Sequencing   Comments for bed mobility, transfer sequencing   Interdisciplinary Plan of Care Collaboration   IDT Collaboration with  Nursing   Patient Position at End of Therapy Seated;Chair Alarm On;Tray Table within Reach;Phone within Reach;Call Light within Reach   Collaboration Comments positioning     Standing for bathroom ADLs x 10 min with seated rest. Increase pain post standing, limiting gait training.    Assessment    Patient able to complete bed mobility to L side with transfer into  with Miki. Patient sleeps on L side of bed at home. Unable to recall hip precautions. Close CGA in bathroom for doffing/donning pants and grooming at sink. Patient very pleasant, but confused with setting and caregivers, although remembered therapist from prior home health episode of care this winter. Gait limited due to pain- RN medicated prior to therapy.    Strengths: Independent prior level of function, Pleasant and cooperative  Barriers: Constipation, Decreased endurance, Fatigue, Generalized weakness, Impaired activity tolerance, Impaired balance, Impaired carryover of learning, Impaired insight/denial of deficits, Impaired functional cognition, Limited mobility, Pain    Plan    Gait, cardiovascular endurance, review hip precautions, strengthening in supine/ seated/ standing, balance/ perform Cameron as able    Passport items to be completed:  Get in/out of bed safely, in/out of a vehicle, safely use mobility device, walk or wheel around home/community, navigate up and down stairs, show how to get up/down from the ground, ensure home is accessible, demonstrate HEP, complete caregiver training    Physical Therapy Problems (Active)       Problem: Balance       Dates: Start:  06/13/23         Goal:  STG-Within one week, patient will improve standing balance to tolerate static standing x 1 min on firm surface with contact guard assist while performing reaching task       Dates: Start:  06/13/23    Expected End:  06/30/23               Problem: Mobility       Dates: Start:  06/13/23         Goal: STG-Within one week, patient will ambulate 100 ft with FWW and contact guard assist       Dates: Start:  06/13/23    Expected End:  06/30/23            Goal: STG-Within one week, patient will ambulate up/down a 6 inch curb with FWW and min assist       Dates: Start:  06/13/23    Expected End:  06/30/23               Problem: Mobility Transfers       Dates: Start:  06/13/23         Goal: STG-Within one week, patient will perform bed mobility from flat bed with railing with contact guard assist       Dates: Start:  06/13/23    Expected End:  06/30/23            Goal: STG-Within one week, patient will teach back fall recovery with minimal cuing (limited ability to actually perform due to posterior hip precautions)       Dates: Start:  06/13/23    Expected End:  06/30/23               Problem: PT-Long Term Goals       Dates: Start:  06/13/23         Goal: LTG-By discharge, patient will transfer one surface to another with FWW and supervision assist       Dates: Start:  06/13/23    Expected End:  06/30/23            Goal: LTG-By discharge, patient will perform home exercise program from handouts with set up assist       Dates: Start:  06/13/23    Expected End:  06/30/23            Goal: LTG-By discharge, patient will ambulate up/down flight of stairs with bilateral railing and supervision assist       Dates: Start:  06/13/23    Expected End:  06/30/23            Goal: LTG-By discharge, patient will transfer in/out of a car with supervision assist and FWW       Dates: Start:  06/13/23    Expected End:  06/30/23            Goal: LTG-By discharge, patient will teach back hip precautions 100% with no cuing       Dates: Start:   06/13/23    Expected End:  06/30/23

## 2023-06-18 NOTE — FLOWSHEET NOTE
06/18/23 0740   Events/Summary/Plan   Events/Summary/Plan 02 pulse ox check   Vital Signs   Pulse 66   Respiration 14   Pulse Oximetry 96 %   $ Pulse Oximetry (Spot Check) Yes   Respiratory Assessment   Level of Consciousness Responds to voice   Chest Exam   Work Of Breathing / Effort Shallow   Oxygen   O2 (LPM) 1   O2 Delivery Device Silicone Nasal Cannula

## 2023-06-18 NOTE — CARE PLAN
"The patient is Stable - Low risk of patient condition declining or worsening    Shift Goals  Clinical Goals: fall prevention, pain control  Patient Goals: pain control, rest  Family Goals: none present    Problem: Fall Risk - Rehab  Goal: Patient will remain free from falls  Outcome: Progressing  Note: Brianna Brady Fall risk Assessment Score: 14    High fall risk Interventions   - Alarming seatbelt  - Bed and strip alarm   - Yellow sign by the door   - Yellow wrist band \"Fall risk\"  - Room near to the nurse station  - Do not leave patient unattended in the bathroom  - Fall risk education provided     Problem: Skin Integrity  Goal: Patient's skin integrity will be maintained or improve  Outcome: Progressing  Note:   Reinaldo Score: 17    Patient's skin remains intact and free from new or accidental injury this shift; no s/s of infection. RN wound protocol checked. Encouraged hydration and educated about the importance of nutrition to keep skin integrity. Will continue to monitor.       "

## 2023-06-18 NOTE — CARE PLAN
Problem: Knowledge Deficit - Standard  Goal: Patient and family/care givers will demonstrate understanding of plan of care, disease process/condition, diagnostic tests and medications  Outcome: Progressing   Pt education given regarding plan of care with emphasis on adequate hydration, pt shows some understanding, will continue to reinforce education and continue to monitor.         Problem: Fall Risk - Rehab  Goal: Patient will remain free from falls  Outcome: Progressing   Pt education given regarding fall precautions AND safety measures, pt shows poor understanding, pt is impulsive and has attempted to self transfer this shift, will continue to reinforce education and continue to monitor.

## 2023-06-18 NOTE — CARE PLAN
Problem: Knowledge Deficit - Standard  Goal: Patient and family/care givers will demonstrate understanding of plan of care, disease process/condition, diagnostic tests and medications  Outcome: Progressing   Pt education given regarding plan of care with emphasis on adequate hydration, pt shows some understanding with limited follow through, will continue to reinforce education and continue to monitor.            Problem: Fall Risk - Rehab  Goal: Patient will remain free from falls  Outcome: Progressing   Pt education given regarding fall precautions AND safety measures, pt shows poor understanding, pt is impulsive and has attempted to self transfer this shift, will continue to reinforce education and continue to monitor.

## 2023-06-18 NOTE — THERAPY
Speech Language Pathology  Daily Treatment     Patient Name: Chava Bray  Age:  87 y.o., Sex:  female  Medical Record #: [id number]  Today's Date: 6/18/2023     Precautions  Precautions: Fall Risk, Posterior Hip Precautions, Weight Bearing As Tolerated Left Lower Extremity  Comments: Hx Dementia, impaired safety    Subjective    Pt pleasant and cooperative during tx.  Pt demonstrated confusion and expecting to go home this day.  Pt was reoriented to her current situation, but will likely need further cueing.  Pt reported severe hip pain.  Nursing informed.     Objective       06/18/23 1401   Treatment Charges   SLP Cognitive Skill Development First 15 Minutes 1   SLP Cognitive Skill Development Additional 15 Minutes 3   SLP Total Time Spent   SLP Individual Total Time Spent (Mins) 60   Cognition   Attention to Task Minimal (4)   Moderate Attention Severe (2)   Functional Memory Activities Severe (2)         Assessment    Selective attn targeted through use of word search.  Pt attended to the task without the need for redirection.  Pt benefits from mod-max cues to aid in searching strategy.  Pt able to locate 3 of 17 targets given min verbal cues.  Memory book introduced this day.  Pt required mod-max verbal cues to recall this day's events.     Strengths: Willingly participates in therapeutic activities, Pleasant and cooperative, Effective communication skills  Barriers: Generalized weakness, Impaired carryover of learning, Impaired insight/denial of deficits    Plan    Memory, attn, problem solving        Speech Therapy Problems (Active)       Problem: Problem Solving STGs       Dates: Start:  06/16/23         Goal: STG-Within one week, patient will complete SCCAN with additional goals as appropriate.        Dates: Start:  06/16/23    Expected End:  06/30/23            Goal: STG-Within one week, patient will answer basic problem solving questions with 70% accuracy given mod verbal cues.         Dates: Start:   06/16/23    Expected End:  06/30/23               Problem: Speech/Swallowing LTGs       Dates: Start:  06/16/23         Goal: LTG-By discharge, patient will solve basic problems Miki for safe discharge       Dates: Start:  06/16/23    Expected End:  06/30/23

## 2023-06-18 NOTE — FLOWSHEET NOTE
06/18/23 0730   Events/Summary/Plan   Events/Summary/Plan RA pulse ox check   Vital Signs   Pulse 69   Respiration 14   Pulse Oximetry (!) 85 %   $ Pulse Oximetry (Spot Check) Yes   Respiratory Assessment   Level of Consciousness Responds to voice  (Appears to be sleeping)   Chest Exam   Work Of Breathing / Effort Shallow   Oxygen   O2 Delivery Device Room air w/o2 available  (Placed on 1L)

## 2023-06-19 ENCOUNTER — APPOINTMENT (OUTPATIENT)
Dept: OCCUPATIONAL THERAPY | Facility: REHABILITATION | Age: 88
DRG: 560 | End: 2023-06-19
Attending: PHYSICAL MEDICINE & REHABILITATION
Payer: MEDICARE

## 2023-06-19 ENCOUNTER — APPOINTMENT (OUTPATIENT)
Dept: PHYSICAL THERAPY | Facility: REHABILITATION | Age: 88
DRG: 560 | End: 2023-06-19
Attending: PHYSICAL MEDICINE & REHABILITATION
Payer: MEDICARE

## 2023-06-19 ENCOUNTER — APPOINTMENT (OUTPATIENT)
Dept: SPEECH THERAPY | Facility: REHABILITATION | Age: 88
End: 2023-06-19
Attending: PHYSICAL MEDICINE & REHABILITATION
Payer: MEDICARE

## 2023-06-19 PROCEDURE — 97112 NEUROMUSCULAR REEDUCATION: CPT

## 2023-06-19 PROCEDURE — 97130 THER IVNTJ EA ADDL 15 MIN: CPT

## 2023-06-19 PROCEDURE — A9270 NON-COVERED ITEM OR SERVICE: HCPCS | Performed by: PHYSICAL MEDICINE & REHABILITATION

## 2023-06-19 PROCEDURE — 700111 HCHG RX REV CODE 636 W/ 250 OVERRIDE (IP): Performed by: PHYSICAL MEDICINE & REHABILITATION

## 2023-06-19 PROCEDURE — 97535 SELF CARE MNGMENT TRAINING: CPT

## 2023-06-19 PROCEDURE — 97530 THERAPEUTIC ACTIVITIES: CPT

## 2023-06-19 PROCEDURE — 700102 HCHG RX REV CODE 250 W/ 637 OVERRIDE(OP): Performed by: PHYSICAL MEDICINE & REHABILITATION

## 2023-06-19 PROCEDURE — 770010 HCHG ROOM/CARE - REHAB SEMI PRIVAT*

## 2023-06-19 PROCEDURE — 97129 THER IVNTJ 1ST 15 MIN: CPT

## 2023-06-19 PROCEDURE — 99232 SBSQ HOSP IP/OBS MODERATE 35: CPT | Performed by: PHYSICAL MEDICINE & REHABILITATION

## 2023-06-19 PROCEDURE — 97110 THERAPEUTIC EXERCISES: CPT

## 2023-06-19 RX ORDER — LISINOPRIL 5 MG/1
5 TABLET ORAL DAILY
Status: DISCONTINUED | OUTPATIENT
Start: 2023-06-20 | End: 2023-06-26

## 2023-06-19 RX ADMIN — OXYCODONE 5 MG: 5 TABLET ORAL at 20:04

## 2023-06-19 RX ADMIN — SENNOSIDES AND DOCUSATE SODIUM 2 TABLET: 50; 8.6 TABLET ORAL at 08:19

## 2023-06-19 RX ADMIN — OXYCODONE HYDROCHLORIDE 10 MG: 10 TABLET ORAL at 13:42

## 2023-06-19 RX ADMIN — TRAZODONE HYDROCHLORIDE 50 MG: 50 TABLET ORAL at 23:43

## 2023-06-19 RX ADMIN — POLYETHYLENE GLYCOL 3350 1 PACKET: 17 POWDER, FOR SOLUTION ORAL at 08:19

## 2023-06-19 RX ADMIN — SENNOSIDES AND DOCUSATE SODIUM 2 TABLET: 50; 8.6 TABLET ORAL at 20:04

## 2023-06-19 RX ADMIN — NITROFURANTOIN MONOHYDRATE/MACROCRYSTALLINE 100 MG: 25; 75 CAPSULE ORAL at 08:19

## 2023-06-19 RX ADMIN — OXYCODONE HYDROCHLORIDE 10 MG: 10 TABLET ORAL at 10:20

## 2023-06-19 RX ADMIN — ENOXAPARIN SODIUM 40 MG: 100 INJECTION SUBCUTANEOUS at 18:03

## 2023-06-19 RX ADMIN — OMEPRAZOLE 20 MG: 20 CAPSULE, DELAYED RELEASE ORAL at 08:19

## 2023-06-19 ASSESSMENT — PATIENT HEALTH QUESTIONNAIRE - PHQ9
SUM OF ALL RESPONSES TO PHQ9 QUESTIONS 1 AND 2: 0
1. LITTLE INTEREST OR PLEASURE IN DOING THINGS: NOT AT ALL
2. FEELING DOWN, DEPRESSED, IRRITABLE, OR HOPELESS: NOT AT ALL

## 2023-06-19 ASSESSMENT — PAIN SCALES - PAIN ASSESSMENT IN ADVANCED DEMENTIA (PAINAD)
FACIALEXPRESSION: SMILING OR INEXPRESSIVE
CONSOLABILITY: NO NEED TO CONSOLE
CONSOLABILITY: NO NEED TO CONSOLE
BREATHING: NORMAL
TOTALSCORE: 0
TOTALSCORE: 0
BREATHING: NORMAL
BODYLANGUAGE: RELAXED
BODYLANGUAGE: RELAXED
FACIALEXPRESSION: SMILING OR INEXPRESSIVE

## 2023-06-19 ASSESSMENT — PAIN DESCRIPTION - PAIN TYPE: TYPE: ACUTE PAIN

## 2023-06-19 ASSESSMENT — ACTIVITIES OF DAILY LIVING (ADL)
BED_CHAIR_WHEELCHAIR_TRANSFER_DESCRIPTION: ADAPTIVE EQUIPMENT
TOILETING_LEVEL_OF_ASSIST_DESCRIPTION: GRAB BAR;SET-UP OF EQUIPMENT;SUPERVISION FOR SAFETY;VERBAL CUEING
TOILET_TRANSFER_DESCRIPTION: ADAPTIVE EQUIPMENT
BED_CHAIR_WHEELCHAIR_TRANSFER_DESCRIPTION: ADAPTIVE EQUIPMENT;SET-UP OF EQUIPMENT;SUPERVISION FOR SAFETY
TOILET_TRANSFER_DESCRIPTION: GRAB BAR;SET-UP OF EQUIPMENT;SUPERVISION FOR SAFETY;VERBAL CUEING

## 2023-06-19 ASSESSMENT — GAIT ASSESSMENTS
DISTANCE (FEET): 15
ASSISTIVE DEVICE: FRONT WHEEL WALKER
GAIT LEVEL OF ASSIST: CONTACT GUARD ASSIST

## 2023-06-19 ASSESSMENT — PAIN SCALES - WONG BAKER
WONGBAKER_NUMERICALRESPONSE: HURTS JUST A LITTLE BIT
WONGBAKER_NUMERICALRESPONSE: DOESN'T HURT AT ALL

## 2023-06-19 NOTE — THERAPY
Speech Language Pathology  Daily Treatment     Patient Name: Chava Bray  Age:  87 y.o., Sex:  female  Medical Record #: [id number]  Today's Date: 6/19/2023     Precautions  Precautions: Fall Risk, Posterior Hip Precautions, Weight Bearing As Tolerated Left Lower Extremity  Comments: Hx Dementia, impaired safety    Subjective    Pt pleasant and cooperative during tx.       Objective       06/19/23 1431   Treatment Charges   SLP Cognitive Skill Development First 15 Minutes 1   SLP Cognitive Skill Development Additional 15 Minutes 3   SLP Total Time Spent   SLP Individual Total Time Spent (Mins) 60   Cognition   Attention to Task Minimal (4)   Functional Memory Activities Moderate (3)  (for daily events)         Assessment    Pt located 33% of target words in a word search.  Pt required additional time and min verbal cues to attend to the task.  Pt recalled this days meals given min verbal cues.  Pt recalled PTs name, and session tasks, independently.  Pt required mod verbal cues to recall OT session.       Strengths: Willingly participates in therapeutic activities, Pleasant and cooperative, Effective communication skills  Barriers: Generalized weakness, Impaired carryover of learning, Impaired insight/denial of deficits    Plan    Use of memory book, attn, safey/problem solving        Speech Therapy Problems (Active)       Problem: Problem Solving STGs       Dates: Start:  06/16/23         Goal: STG-Within one week, patient will complete SCCAN with additional goals as appropriate.        Dates: Start:  06/16/23    Expected End:  06/30/23            Goal: STG-Within one week, patient will answer basic problem solving questions with 70% accuracy given mod verbal cues.         Dates: Start:  06/16/23    Expected End:  06/30/23               Problem: Speech/Swallowing LTGs       Dates: Start:  06/16/23         Goal: LTG-By discharge, patient will solve basic problems Miki for safe discharge       Dates: Start:   06/16/23    Expected End:  06/30/23

## 2023-06-19 NOTE — THERAPY
"Physical Therapy   Daily Treatment     Patient Name: Chava Bray  Age:  87 y.o., Sex:  female  Medical Record #: [id number]  Today's Date: 6/19/2023     Precautions  Precautions: (P) Fall Risk, Posterior Hip Precautions, Weight Bearing As Tolerated Left Lower Extremity  Comments: (P) Hx Dementia, impaired safety    Subjective    \"I just laid down; I'm not getting up\", upon PT arrival.      Objective       06/19/23 1301   PT Charge Group   PT Therapeutic Exercise (Units) 1   PT Neuromuscular Re-Education / Balance (Units) 1   PT Therapeutic Activities (Units) 2   PT Total Time Spent   PT Individual Total Time Spent (Mins) 60   Precautions   Precautions Fall Risk;Posterior Hip Precautions;Weight Bearing As Tolerated Left Lower Extremity   Comments Hx Dementia, impaired safety   Pain 0 - 10 Group   Location Hip   Location Orientation Left   Therapist Pain Assessment 8;Nurse Notified  (Ice application to L hip during sup exercises)   Cognition    New Learning Impaired   Gait Functional Level of Assist    Gait Level Of Assist Contact Guard Assist   Assistive Device Front Wheel Walker   Distance (Feet) 15  (bed <> toilet)   # of Times Distance was Traveled 2   Deviation Antalgic;Step To;Decreased Base Of Support;Bradykinetic;Decreased Toe Off;Decreased Heel Strike   Transfer Functional Level of Assist   Bed, Chair, Wheelchair Transfer Contact Guard Assist  (Pt introduced to leg )   Bed Chair Wheelchair Transfer Description Adaptive equipment  (SPT FWW)   Toilet Transfers Minimal Assist   Toilet Transfer Description Adaptive equipment  (SPT FWW)   Supine Lower Body Exercise   Hip Adduction  1 set of 15;Bilateral  (isometric)   Ankle Pumps 1 set of 15;Bilateral   Gluteal Isometrics 1 set of 15;Bilateral   Quadriceps Isometrics 1 set of 15;Bilateral   Bed Mobility    Supine to Sit Standby Assist  (introduction to leg  device)   Sit to Supine Standby Assist  (introduction to leg  device)   Sit to Stand " "Minimal Assist   Scooting Standby Assist  (leg )   Rolling Standby Assist   Neuro-Muscular Treatments   Neuro-Muscular Treatments Weight Shift Left;Weight Shift Right;Sequencing;Verbal Cuing   Comments Pt introduced to CARE station on TV for relaxation and guided imagery. Pt provided with handout to review post hip precautions.   Interdisciplinary Plan of Care Collaboration   IDT Collaboration with  Nursing   Patient Position at End of Therapy In Bed;Call Light within Reach;Tray Table within Reach;Phone within Reach;Bed Alarm On  (Thick towel roll between BLE to maintain neutral hip rotation)   Collaboration Comments Pain medication requested/ provided. POC.         Assessment    Pt initially declined OOB interventions, but was agreeable to supine therapeutic exercise. After introduction to leg , and ice application, patient was agreeable to transfer EOB using leg . Once EOB, pt was agreeable to stand up with walker \"to stretch (her) back\", and then asked to use the bathroom. Pt ambulated to/from bathroom with CGA, but gait pattern remained antalgic. Pt was anette to recall 2:3 posterior hip precautions, and appreciated referring to handout for review. Pt was appreciative of leg , and introduction to the CARE station for relaxation.      Strengths: Independent prior level of function, Pleasant and cooperative  Barriers: Constipation, Decreased endurance, Fatigue, Generalized weakness, Impaired activity tolerance, Impaired balance, Impaired carryover of learning, Impaired insight/denial of deficits, Impaired functional cognition, Limited mobility, Pain    Plan    Gait, cardiovascular endurance, review hip precautions, strengthening in supine/ seated/ standing, review use of leg  device    Passport items to be completed:  Get in/out of bed safely, in/out of a vehicle, safely use mobility device, walk or wheel around home/community, navigate up and down stairs, show how to get up/down from " the ground, ensure home is accessible, demonstrate HEP, complete caregiver training    Physical Therapy Problems (Active)       Problem: Balance       Dates: Start:  06/13/23         Goal: STG-Within one week, patient will improve standing balance to tolerate static standing x 1 min on firm surface with contact guard assist while performing reaching task       Dates: Start:  06/13/23    Expected End:  06/30/23               Problem: Mobility       Dates: Start:  06/13/23         Goal: STG-Within one week, patient will ambulate 100 ft with FWW and contact guard assist       Dates: Start:  06/13/23    Expected End:  06/30/23            Goal: STG-Within one week, patient will ambulate up/down a 6 inch curb with FWW and min assist       Dates: Start:  06/13/23    Expected End:  06/30/23               Problem: Mobility Transfers       Dates: Start:  06/13/23         Goal: STG-Within one week, patient will perform bed mobility from flat bed with railing with contact guard assist       Dates: Start:  06/13/23    Expected End:  06/30/23            Goal: STG-Within one week, patient will teach back fall recovery with minimal cuing (limited ability to actually perform due to posterior hip precautions)       Dates: Start:  06/13/23    Expected End:  06/30/23               Problem: PT-Long Term Goals       Dates: Start:  06/13/23         Goal: LTG-By discharge, patient will transfer one surface to another with FWW and supervision assist       Dates: Start:  06/13/23    Expected End:  06/30/23            Goal: LTG-By discharge, patient will perform home exercise program from handouts with set up assist       Dates: Start:  06/13/23    Expected End:  06/30/23            Goal: LTG-By discharge, patient will ambulate up/down flight of stairs with bilateral railing and supervision assist       Dates: Start:  06/13/23    Expected End:  06/30/23            Goal: LTG-By discharge, patient will transfer in/out of a car with supervision  assist and FWW       Dates: Start:  06/13/23    Expected End:  06/30/23            Goal: LTG-By discharge, patient will teach back hip precautions 100% with no cuing       Dates: Start:  06/13/23    Expected End:  06/30/23

## 2023-06-19 NOTE — THERAPY
Occupational Therapy  Daily Treatment     Patient Name: Chava Bray  Age:  87 y.o., Sex:  female  Medical Record #: [id number]  Today's Date: 6/19/2023     Precautions  Precautions: (P) Fall Risk, Posterior Hip Precautions, Weight Bearing As Tolerated Left Lower Extremity  Comments: Hx Dementia, impaired safety         Subjective    Patient was seated in wc in room and requested to do some grooming and toileting.       Objective       06/19/23 0931   OT Charge Group   OT Self Care / ADL (Units) 2   OT Neuromuscular Re-education / Balance (Units) 1   OT Therapy Activity (Units) 1   OT Total Time Spent   OT Individual Total Time Spent (Mins) 60   Precautions   Precautions Fall Risk;Posterior Hip Precautions;Weight Bearing As Tolerated Left Lower Extremity   Pain 0 - 10 Group   Location Hip   Location Orientation Left   Therapist Pain Assessment 7;During Activity;Nurse Notified   Non Verbal Descriptors   Non Verbal Scale  Calm   Functional Level of Assist   Grooming Modified Independent;Seated   Grooming Description   (hair/teeth, hands/face, lotion)   Toileting Standby Assist   Toileting Description Grab bar;Set-up of equipment;Supervision for safety;Verbal cueing   Bed, Chair, Wheelchair Transfer Standby Assist   Bed Chair Wheelchair Transfer Description Adaptive equipment;Set-up of equipment;Supervision for safety  (fww w/c to bed)   Toilet Transfers Standby Assist   Toilet Transfer Description Grab bar;Set-up of equipment;Supervision for safety;Verbal cueing   Bed Mobility    Sit to Supine Supervised   Interdisciplinary Plan of Care Collaboration   IDT Collaboration with  Nursing   Patient Position at End of Therapy In Bed;Bed Alarm On;Call Light within Reach;Tray Table within Reach;Phone within Reach   Collaboration Comments RN gave pain meds     Dynamic standing balance challenges reaching to Right and Left for objects, then tossing at target with single to no UE support.    Assessment    Patient had no  losses of balance during toileting, toilet transfer or standing balance activity.  She had a continent BM.    Strengths: Motivated for self care and independence, Pleasant and cooperative, Supportive family, Willingly participates in therapeutic activities  Barriers: Decreased endurance, Fatigue, Generalized weakness, Home accessibility, Impaired activity tolerance, Impaired balance, Impaired carryover of learning, Impaired insight/denial of deficits, Impaired functional cognition, Limited mobility, Pain, Pain poorly managed    Plan    ADLs w/ AE education as able, functional mobility/transfers, strength/endurance, balance, functional cog. Contact SO re: bathroom set up/equipment needs.    Occupational Therapy Goals (Active)       Problem: Bathing       Dates: Start:  06/13/23         Goal: STG-Within one week, patient will bathe with min A using AE as needed to maintain hip prec.        Dates: Start:  06/13/23    Expected End:  06/30/23               Problem: Dressing       Dates: Start:  06/13/23         Goal: STG-Within one week, patient will dress LB with min A using AE as needed to maintain hip prec.        Dates: Start:  06/13/23    Expected End:  06/30/23               Problem: OT Long Term Goals       Dates: Start:  06/13/23         Goal: LTG-By discharge, patient will complete basic self care tasks at supv to mod I level using AE as needed to maintain hip prec.        Dates: Start:  06/13/23    Expected End:  06/30/23            Goal: LTG-By discharge, patient will perform bathroom transfers at supv to mod I level using AD/DME as needed.        Dates: Start:  06/13/23    Expected End:  06/30/23               Problem: Toileting       Dates: Start:  06/13/23         Goal: STG-Within one week, patient will complete toileting tasks with min A using AE as needed.        Dates: Start:  06/13/23    Expected End:  06/30/23

## 2023-06-19 NOTE — THERAPY
Recreational Therapy   Initial Evaluation     Patient Name: Chava Bray  Age:  87 y.o., Sex:  female  Medical Record #: [id number]  Today's Date: 6/19/2023     Subjective    Pt initially refused to participate due to her wanting to change her pants. Following assisting her change she was able to fully participate.     Objective       06/19/23 0901   Procedural Tracking   Procedural Tracking Community Re-Integration;Leisure Skills Awareness;Cognitive Skills Training   Treatment Time   Total Time Spent (mins) 30   Functional Ability Status - Cognitive   Attention Span Remains on Task   Comprehension Follows One Step Commands;Requires Cueing   Judgment Able to Make Independent Decisions   Cognitive Comments Min cues for single step leisure task completion   Functional Ability Status - Emotional    Affect Appropriate   Mood Appropriate   Behavior Appropriate   Leisure Competence Measure   Leisure Awareness Independent   Leisure Attitude Independent   Leisure Skills Minimal Assist   Cultural / Social Behaviors Independent   Interpersonal Skills Independent   Community Integration Skills Minimal Assist   Social Contact Independent   Community Participation Minimal Assist   Skilled Intervention    Skilled Intervention Gross Motor Leisure;Cognitive Leisure   Skilled Intervention Comments Cognitive leisure single step   Interdisciplinary Plan of Care Collaboration   Patient Position at End of Therapy Seated;Call Light within Reach   Strengths & Barriers   Strengths Willingly participates in therapeutic activities;Able to follow instructions   Barriers Difficulty following instructions;Limited mobility;Pain;Impaired carryover of learning;Impaired functional cognition         Assessment  Patient is 87 y.o. female with a diagnosis of Left displaced femoral neck fracture.  Additional factors influencing patient status / progress (ie: cognitive factors, co-morbidities, social support, etc): The patient is a 87 y.o. female  with a past medical history of hyperlipidemia, hypothyroidism, history of arthritis, possible baseline dementia;  who presented on 6/8/2023  8:43 AM with left hip pain after ground-level fall.  Per documentation, patient had tripped and fallen on her left side a couple of days prior to presenting to the emergency department.  Patient did not hit her head and had no loss of consciousness.  Upon evaluation in the emergency department, images were obtained that showed a left femoral neck fracture.  Orthopedic surgery was consulted, and patient was taken to the OR on 6/9 for open treatment of left displaced femoral neck fracture with hemiarthroplasty performed by Dr. Becker.  Postop, patient has posterior precautions and is WBAT LLE.  Postop, patient's leukocytosis has improved.  Her hemoglobin has remained stable.  She has mild hypotension but has been asymptomatic.  She has been hypoxic, requiring 1 L supplemental O2.  Is not on oxygen at home.      She was given a single step cognitive leisure activity in which she required Min to Mod verbal cues to complete. She was given 6 words with one letter missing that she would have to complete each word after drawing random letters from a pile. Min when missing one letter and mod when missing two letters.       Plan  Recommend Recreational Therapy 30-60 minutes per day  2-3  days per week for 2 weeks for the following treatments:  Community Re-Integration, Leisure Skills Awareness, and Cognitive Skills Training    Passport items to be completed:  Verbalize two positive leisure activities, discuss returning to work, hobbies, community groups or volunteer activities, explore community resources     Goals:  Long term and short term goals have been discussed with patient and they are in agreement.    Recreation Therapy Problems (Active)       Problem: Recreation Therapy       Dates: Start:  06/19/23         Goal: STG-Within one week, patient will demonstrate leisure problem  solving by sharing on one new positive leisure activity she would like to participate in following dc.        Dates: Start:  06/19/23            Goal: STG-Within one week, patient will solve a single step leisure activity with Mod verbal cues.        Dates: Start:  06/19/23            Goal: LTG-By discharge, patient will demonstrate leisure problem solving by sharing on two new positive leisure activity she would like to participate in following dc and any barriers to their participation.        Dates: Start:  06/19/23            Goal: LTG-By discharge, patient will solve a single step leisure activity with Min verbal cues.        Dates: Start:  06/19/23

## 2023-06-19 NOTE — PROGRESS NOTES
"  Physical Medicine & Rehabilitation Progress Note    Encounter Date: 6/19/2023    Chief Complaint: Decreased mobility, constipation    Interval Events (Subjective):  Patient sitting up in room. She reports therapy is going OK. Denies NVD. Denies SOB. No dysuria.     _____________________________________  Interdisciplinary Team Conference   Most recent IDT on 6/15/2023    Discharge Date/Disposition:  6/26/23  _____________________________________      Objective:  VITAL SIGNS: /70   Pulse 65   Temp 36.4 °C (97.6 °F) (Oral)   Resp 18   Ht 1.6 m (5' 3\")   Wt 52.2 kg (115 lb)   SpO2 96%   BMI 20.37 kg/m²   Gen: NAD  Psych: Mood and affect appropriate  CV: RRR, 1+ edema  Resp: CTAB, no upper airway sounds  Abd: NTND  Neuro: AOx2, following commands    Laboratory Values:  No results found for this or any previous visit (from the past 72 hour(s)).      Medications:  Scheduled Medications   Medication Dose Frequency    senna-docusate  2 Tablet BID    And    polyethylene glycol/lytes  1 Packet DAILY    Pharmacy Consult Request  1 Each PHARMACY TO DOSE    omeprazole  20 mg DAILY    enoxaparin (LOVENOX) injection  40 mg DAILY AT 1800     PRN medications: senna-docusate **AND** polyethylene glycol/lytes **AND** magnesium hydroxide **AND** bisacodyl, Respiratory Therapy Consult, hydrALAZINE, acetaminophen, mag hydrox-al hydrox-simeth, ondansetron **OR** ondansetron, traZODone, sodium chloride, oxyCODONE immediate-release **OR** oxyCODONE immediate-release    Diet:  Current Diet Order   Procedures    Diet Order Diet: Regular       Medical Decision Making and Plan:  Left hip fracture - Patient with mechanical fall at home with left hip fracture s/p hemiarthroplasty on 6/9/23 with Dr. Becker. Patient is WBAT, posterior precautions.   -PT and OT for mobility and ADLs. Per guidelines, 15 hours per week between PT, OT and/or SLP.  -Follow-up Dr. Becker     HTN - Previously on Lisinopril but low SBP after fall. Will " monitor. Into 110s, will monitor. Occasiona low 140s, will start 5 mg Lisinopril     Azotemia - Worsening after surgery. Check AM CMP - improved to 15     Hypothyroidism - Patient was on Levothyroxine in the past. Currently not on it. Will check TSH wnl      Neuropathic pain - Patient with history of neuropathic pain from spinal stenosis. Previously on Gabapentin     Dysuria - Developed dysuria, UA + for UTI. Start Omnicef and send for cultures. PharmD recommending change to Macrobid. Ucx with E coli, completed macrobid    Pain - Patient on Tylenol scheduled and PRN oxycodone     Dementia - Does not appear to be on any medications. Will consult SLP and consider start medication for dementia    Skin - Patient at risk for skin breakdown due to debility in areas including sacrum, achilles, elbows and head in addition to other sites. Nursing to assess skin daily.      GI Ppx - Patient on Prilosec for GERD prophylaxis. Patient on Senna-docusate for constipation prophylaxis.   -Severe constipation, mild distention, give Miralax. KUB on 6/13/23 - mild constipation in distal colon. Continue Miralax. Continue PPI    DVT Ppx - Patient Lovenox on transfer.  ____________________________________    T. French John MD/PhD  [facility]   [facility]   ____________________________________

## 2023-06-20 ENCOUNTER — APPOINTMENT (OUTPATIENT)
Dept: OCCUPATIONAL THERAPY | Facility: REHABILITATION | Age: 88
DRG: 560 | End: 2023-06-20
Attending: PHYSICAL MEDICINE & REHABILITATION
Payer: MEDICARE

## 2023-06-20 ENCOUNTER — APPOINTMENT (OUTPATIENT)
Dept: SPEECH THERAPY | Facility: REHABILITATION | Age: 88
DRG: 560 | End: 2023-06-20
Attending: PHYSICAL MEDICINE & REHABILITATION
Payer: MEDICARE

## 2023-06-20 ENCOUNTER — APPOINTMENT (OUTPATIENT)
Dept: PHYSICAL THERAPY | Facility: REHABILITATION | Age: 88
DRG: 560 | End: 2023-06-20
Attending: PHYSICAL MEDICINE & REHABILITATION
Payer: MEDICARE

## 2023-06-20 PROCEDURE — A9270 NON-COVERED ITEM OR SERVICE: HCPCS | Performed by: PHYSICAL MEDICINE & REHABILITATION

## 2023-06-20 PROCEDURE — 94760 N-INVAS EAR/PLS OXIMETRY 1: CPT

## 2023-06-20 PROCEDURE — 770010 HCHG ROOM/CARE - REHAB SEMI PRIVAT*

## 2023-06-20 PROCEDURE — 700111 HCHG RX REV CODE 636 W/ 250 OVERRIDE (IP): Performed by: PHYSICAL MEDICINE & REHABILITATION

## 2023-06-20 PROCEDURE — 99232 SBSQ HOSP IP/OBS MODERATE 35: CPT | Performed by: PHYSICAL MEDICINE & REHABILITATION

## 2023-06-20 PROCEDURE — 97530 THERAPEUTIC ACTIVITIES: CPT

## 2023-06-20 PROCEDURE — 97110 THERAPEUTIC EXERCISES: CPT

## 2023-06-20 PROCEDURE — 700102 HCHG RX REV CODE 250 W/ 637 OVERRIDE(OP): Performed by: PHYSICAL MEDICINE & REHABILITATION

## 2023-06-20 PROCEDURE — 97130 THER IVNTJ EA ADDL 15 MIN: CPT

## 2023-06-20 PROCEDURE — 97129 THER IVNTJ 1ST 15 MIN: CPT

## 2023-06-20 PROCEDURE — 97535 SELF CARE MNGMENT TRAINING: CPT

## 2023-06-20 RX ADMIN — OXYCODONE 5 MG: 5 TABLET ORAL at 13:54

## 2023-06-20 RX ADMIN — OXYCODONE 5 MG: 5 TABLET ORAL at 20:45

## 2023-06-20 RX ADMIN — TRAZODONE HYDROCHLORIDE 50 MG: 50 TABLET ORAL at 20:45

## 2023-06-20 RX ADMIN — OXYCODONE 5 MG: 5 TABLET ORAL at 07:26

## 2023-06-20 RX ADMIN — ENOXAPARIN SODIUM 40 MG: 100 INJECTION SUBCUTANEOUS at 17:13

## 2023-06-20 RX ADMIN — OMEPRAZOLE 20 MG: 20 CAPSULE, DELAYED RELEASE ORAL at 08:48

## 2023-06-20 RX ADMIN — LISINOPRIL 5 MG: 5 TABLET ORAL at 08:48

## 2023-06-20 RX ADMIN — OXYCODONE 5 MG: 5 TABLET ORAL at 18:24

## 2023-06-20 ASSESSMENT — PAIN SCALES - PAIN ASSESSMENT IN ADVANCED DEMENTIA (PAINAD)
TOTALSCORE: 0
CONSOLABILITY: NO NEED TO CONSOLE
BODYLANGUAGE: RELAXED
TOTALSCORE: 0
BREATHING: NORMAL
BREATHING: NORMAL
FACIALEXPRESSION: SMILING OR INEXPRESSIVE
FACIALEXPRESSION: SMILING OR INEXPRESSIVE
TOTALSCORE: 0
CONSOLABILITY: NO NEED TO CONSOLE
BODYLANGUAGE: RELAXED
FACIALEXPRESSION: SMILING OR INEXPRESSIVE
CONSOLABILITY: NO NEED TO CONSOLE
BREATHING: NORMAL
BODYLANGUAGE: RELAXED

## 2023-06-20 ASSESSMENT — ACTIVITIES OF DAILY LIVING (ADL)
TOILET_TRANSFER_DESCRIPTION: GRAB BAR;ADAPTIVE EQUIPMENT;INCREASED TIME;SET-UP OF EQUIPMENT;SUPERVISION FOR SAFETY
BED_CHAIR_WHEELCHAIR_TRANSFER_DESCRIPTION: ADAPTIVE EQUIPMENT;INCREASED TIME;SET-UP OF EQUIPMENT;SUPERVISION FOR SAFETY
TOILET_TRANSFER_DESCRIPTION: GRAB BAR;VERBAL CUEING;SUPERVISION FOR SAFETY;INCREASED TIME
TOILETING_LEVEL_OF_ASSIST_DESCRIPTION: GRAB BAR;INCREASED TIME;SUPERVISION FOR SAFETY
BED_CHAIR_WHEELCHAIR_TRANSFER_DESCRIPTION: INCREASED TIME;SUPERVISION FOR SAFETY;VERBAL CUEING

## 2023-06-20 NOTE — FLOWSHEET NOTE
06/20/23 0920   Events/Summary/Plan   Events/Summary/Plan RA pulse ox check   Vital Signs   Pulse 72   Respiration 16   Pulse Oximetry 93 %   $ Pulse Oximetry (Spot Check) Yes   Respiratory Assessment   Level of Consciousness Alert   Chest Exam   Work Of Breathing / Effort Within Normal Limits   Oxygen   O2 Delivery Device Room air w/o2 available

## 2023-06-20 NOTE — THERAPY
Occupational Therapy  Daily Treatment     Patient Name: Chava Bray  Age:  87 y.o., Sex:  female  Medical Record #: [id number]  Today's Date: 6/20/2023     Precautions  Precautions: Fall Risk, Posterior Hip Precautions, Weight Bearing As Tolerated Left Lower Extremity  Comments: Hx Dementia, impaired safety         Subjective    Pt resting in bed, reported 8/10 hip pain, but agreeable to OT session.      Objective       06/20/23 0701   OT Charge Group   OT Self Care / ADL (Units) 4   OT Total Time Spent   OT Individual Total Time Spent (Mins) 60   Precautions   Precautions Fall Risk;Posterior Hip Precautions;Weight Bearing As Tolerated Left Lower Extremity   Comments Hx Dementia, impaired safety   Cognition    Level of Consciousness Alert   Functional Level of Assist   Grooming Modified Independent;Seated   Grooming Description Increased time;Seated in wheelchair at sink  (oral care, washing face, and combing hair seated at sink)   Bathing Minimal Assist   Bathing Description Increased time;Set-up of equipment;Supervision for safety;Verbal cueing  (sponge bath at sinkside, assist to wash feet, supv/set up otherwise)   Upper Body Dressing Supervision   Upper Body Dressing Description Increased time;Set-up of equipment;Supervision for safety   Lower Body Dressing Moderate Assist   Lower Body Dressing Description Reacher;Sock aid;Increased time;Initial preparation for task;Set-up of equipment;Supervision for safety;Verbal cueing  (mod A to don brief, pants and tread socks using AE, cues for carryover and technique)   Toileting Standby Assist   Toileting Description Grab bar;Increased time;Supervision for safety   Bed, Chair, Wheelchair Transfer Contact Guard Assist   Bed Chair Wheelchair Transfer Description Adaptive equipment;Increased time;Set-up of equipment;Supervision for safety  (FWW)   Toilet Transfers Contact Guard Assist   Toilet Transfer Description Grab bar;Adaptive equipment;Increased time;Set-up of  equipment;Supervision for safety  (commode over toilet)   Bed Mobility    Supine to Sit Standby Assist   Scooting Standby Assist   Interdisciplinary Plan of Care Collaboration   IDT Collaboration with  Nursing   Patient Position at End of Therapy Seated;Chair Alarm On;Self Releasing Lap Belt Applied;Other (Comments)  (cafeteria for breakfast)   Collaboration Comments CLOF, POC, pain, BM         Assessment    Pt tolerated OT session well with focus on progression w/ ADLs and functional txfrs. Pt demo improved ability to follow 1 step instructions and increased initiation w/ ADLs. She con't to demo variable carryover w/ posterior hip prec and with ability to use AE for LB dressing. Pt txfrs improving to CGA-SBA w/ FWW.   Strengths: Motivated for self care and independence, Pleasant and cooperative, Supportive family, Willingly participates in therapeutic activities  Barriers: Decreased endurance, Fatigue, Generalized weakness, Home accessibility, Impaired activity tolerance, Impaired balance, Impaired carryover of learning, Impaired insight/denial of deficits, Impaired functional cognition, Limited mobility, Pain, Pain poorly managed    Plan    ADLs w/ AE education as able, functional mobility/transfers, strength/endurance, balance, functional cog. Contact SO re: bathroom set up/equipment needs.      Occupational Therapy Goals (Active)       Problem: Bathing       Dates: Start:  06/13/23         Goal: STG-Within one week, patient will bathe with min A using AE as needed to maintain hip prec.        Dates: Start:  06/13/23    Expected End:  06/30/23               Problem: Dressing       Dates: Start:  06/13/23         Goal: STG-Within one week, patient will dress LB with min A using AE as needed to maintain hip prec.        Dates: Start:  06/13/23    Expected End:  06/30/23               Problem: OT Long Term Goals       Dates: Start:  06/13/23         Goal: LTG-By discharge, patient will complete basic self care tasks  at supv to mod I level using AE as needed to maintain hip prec.        Dates: Start:  06/13/23    Expected End:  06/30/23            Goal: LTG-By discharge, patient will perform bathroom transfers at supv to mod I level using AD/DME as needed.        Dates: Start:  06/13/23    Expected End:  06/30/23               Problem: Toileting       Dates: Start:  06/13/23         Goal: STG-Within one week, patient will complete toileting tasks with min A using AE as needed.        Dates: Start:  06/13/23    Expected End:  06/30/23

## 2023-06-20 NOTE — CARE PLAN
"The patient is Stable - Low risk of patient condition declining or worsening    Shift Goals  Clinical Goals: fall prevention, pain management,  Patient Goals: sleep/rest, pain management  Family Goals: none present      Problem: Pain - Standard  Goal: Alleviation of pain or a reduction in pain to the patient’s comfort goal  Note: Patient able to verbalize pain level and verbalize an acceptable level of pain. Patient complaint of left hip pain and medicated w/ Oxycodone 5 mg with no episode of hypotension and SOB ( O2sat RA 95%) Call light within reach and continue on close monitoring.      Problem: Fall Risk - Rehab  Goal: Patient will remain free from falls  Note: Brianna Brady Fall risk Assessment Score: 21    High fall risk Interventions   - Alarming seatbelt  - Bed and strip alarm   - Yellow sign by the door   - Yellow wrist band \"Fall risk\"  - Room near to the nurse station  - Do not leave patient unattended in the bathroom  - Fall risk education provided    Patient is constantly getting up on her bed with episode of urinary frequency and pain on her left hip. Medicated with Oxycodone with no episode of hypotension and SOB (O2sat RA 95%). Trazadone given for insomnia.  Reminded to use call when in need of help but unable to remember to used. Continue on close monitoring.       "

## 2023-06-20 NOTE — THERAPY
Recreational Therapy  Daily Treatment     Patient Name: Chava Bray  AGE:  87 y.o., SEX:  female  Medical Record #: [id number]  Today's Date: 6/20/2023       Subjective    Patient agreeable to recreation therapy session.     Objective       06/20/23 1501   Procedural Tracking   Procedural Tracking Community Re-Integration;Community Skills Development;Leisure Skills Awareness;Leisure Skills Development;Social Skills Training;Cognitive Skills Training;Gross Motor Functional Leisure Skills;Fine Motor Functional Leisure Skills;Group Treatment   Treatment Time   Total Time Spent (mins) 30   Total Time Missed 0   Functional Ability Status - Cognitive   Attention Span Unable to Remain on Task with Cueing   Comprehension Unable to Process Commands   Judgment Needs Assistance;Impaired;Confused   Functional Ability Status - Emotional    Affect Appropriate   Mood Appropriate   Behavior Appropriate   Skilled Intervention    Skilled Intervention Relaxation / Coping Skills;Social Skills;Cognitive Leisure   Interdisciplinary Plan of Care Collaboration   IDT Collaboration with  Nursing;Certified Nursing Assistant   Patient Position at End of Therapy Seated;In Bed;Bed Alarm On   Strengths & Barriers   Strengths Able to follow instructions;Willingly participates in therapeutic activities;Pleasant and cooperative   Barriers Confused;Decreased endurance;Fatigue;Generalized weakness;Impaired activity tolerance;Impaired balance;Impaired carryover of learning;Impaired functional cognition;Limited mobility         Assessment    Patient and CTRS discussed where she is, when she is scheduled to go home, why she cannot go home right now, and orientation to time/place.     Strengths: (P) Able to follow instructions, Willingly participates in therapeutic activities, Pleasant and cooperative  Barriers: (P) Confused, Decreased endurance, Fatigue, Generalized weakness, Impaired activity tolerance, Impaired balance, Impaired carryover of  learning, Impaired functional cognition, Limited mobility    Plan    Patient will benefit from continued recreation therapy sessions.     Passport items to be completed:  Verbalize two positive leisure activities, discuss returning to work, hobbies, community groups or volunteer activities, explore community resources

## 2023-06-20 NOTE — CARE PLAN
"  Problem: Fall Risk - Rehab  Goal: Patient will remain free from falls  Note: Brianna Brady Fall risk Assessment Score: 21    High fall risk Interventions   - Alarming seatbelt  - Bed and strip alarm   - Yellow sign by the door   - Yellow wrist band \"Fall risk\"  - Room near to the nurse station  - Do not leave patient unattended in the bathroom  - Fall risk education provided  - Yellow Safety Card updated     Problem: Skin Integrity  Goal: Patient's skin integrity will be maintained or improve  Note: Left hip incision with staples. Clean, dry and intact. POD # 11.    The patient is Stable - Low risk of patient condition declining or worsening    Shift Goals  Clinical Goals: Fall prevention  Patient Goals: sleep/rest, pain management  Family Goals: none present  "

## 2023-06-20 NOTE — PROGRESS NOTES
"  Physical Medicine & Rehabilitation Progress Note    Encounter Date: 6/20/2023    Chief Complaint: Decreased mobility, constipation    Interval Events (Subjective):  Patient sitting up in room. She reports she is doing OK. Discussed she is making progress with therapy. Denies NVD. No fever or chills.     _____________________________________  Interdisciplinary Team Conference   Most recent IDT on 6/15/2023    Discharge Date/Disposition:  6/26/23  _____________________________________      Objective:  VITAL SIGNS: /55   Pulse 72   Temp 36.7 °C (98 °F) (Oral)   Resp 16   Ht 1.6 m (5' 3\")   Wt 52.2 kg (115 lb)   SpO2 93%   BMI 20.37 kg/m²   Gen: NAD  Psych: Mood and affect appropriate  CV: RRR, 1+ edema  Resp: CTAB, no upper airway sounds  Abd: NTND  Neuro: AOx2, following commands  Unchanged from 6/19/23    Laboratory Values:  No results found for this or any previous visit (from the past 72 hour(s)).      Medications:  Scheduled Medications   Medication Dose Frequency    lisinopril  5 mg DAILY    senna-docusate  2 Tablet BID    And    polyethylene glycol/lytes  1 Packet DAILY    Pharmacy Consult Request  1 Each PHARMACY TO DOSE    omeprazole  20 mg DAILY    enoxaparin (LOVENOX) injection  40 mg DAILY AT 1800     PRN medications: senna-docusate **AND** polyethylene glycol/lytes **AND** magnesium hydroxide **AND** bisacodyl, Respiratory Therapy Consult, hydrALAZINE, acetaminophen, mag hydrox-al hydrox-simeth, ondansetron **OR** ondansetron, traZODone, sodium chloride, oxyCODONE immediate-release **OR** oxyCODONE immediate-release    Diet:  Current Diet Order   Procedures    Diet Order Diet: Regular       Medical Decision Making and Plan:  Left hip fracture - Patient with mechanical fall at home with left hip fracture s/p hemiarthroplasty on 6/9/23 with Dr. Becker. Patient is WBAT, posterior precautions.   -PT and OT for mobility and ADLs. Per guidelines, 15 hours per week between PT, OT and/or " SLP.  -Follow-up Dr. Becker     HTN - Previously on Lisinopril but low SBP after fall. Will monitor. Into 110s, will monitor. Occasiona low 140s, will start 5 mg Lisinopril. Continue Lisinopril     Azotemia - Worsening after surgery. Check AM CMP - improved to 15     Hypothyroidism - Patient was on Levothyroxine in the past. Currently not on it. Will check TSH - wnl      Neuropathic pain - Patient with history of neuropathic pain from spinal stenosis. Previously on Gabapentin     Dysuria - Developed dysuria, UA + for UTI. Start Omnicef and send for cultures. PharmD recommending change to Macrobid. Ucx with E coli, completed macrobid    Pain - Patient on Tylenol scheduled and PRN oxycodone. Discontinue Tylenol     Dementia - Does not appear to be on any medications. Will consult SLP and consider start medication for dementia    Skin - Patient at risk for skin breakdown due to debility in areas including sacrum, achilles, elbows and head in addition to other sites. Nursing to assess skin daily.      GI Ppx - Patient on Prilosec for GERD prophylaxis. Patient on Senna-docusate for constipation prophylaxis.   -Severe constipation, mild distention, give Miralax. KUB on 6/13/23 - mild constipation in distal colon. Continue PPI and miralax    DVT Ppx - Patient Lovenox on transfer.  ____________________________________    T. French John MD/PhD  [facility] - Physical Medicine & Rehabilitation   [facility] - Brain Injury Medicine   ____________________________________

## 2023-06-20 NOTE — THERAPY
"Physical Therapy   Daily Treatment     Patient Name: Chava Bray  Age:  87 y.o., Sex:  female  Medical Record #: [id number]  Today's Date: 6/20/2023     Precautions  Precautions: Fall Risk, Posterior Hip Precautions, Weight Bearing As Tolerated Left Lower Extremity  Comments: Hx Dementia, impaired safety    Subjective    Patient is found in bed, states she's \"very sore\" and asks me to go get her a cup of coffee. On closer inspection, she is found with hip internally rotated and adducted in supine, spent time to review and quiz on hip precautions. Is finally agreeable to get out of bed to go get a cup of coffee from the cafeteria provided I also get her a warm blanket. Spent significant time toileting as well, had medium bowel movement and was continent of urine.      Objective       06/20/23 1031   PT Charge Group   PT Therapeutic Exercise (Units) 2   PT Therapeutic Activities (Units) 2   PT Total Time Spent   PT Individual Total Time Spent (Mins) 60   Transfer Functional Level of Assist   Bed, Chair, Wheelchair Transfer Contact Guard Assist   Bed Chair Wheelchair Transfer Description Increased time;Supervision for safety;Verbal cueing   Toilet Transfers Contact Guard Assist   Toilet Transfer Description Grab bar;Verbal cueing;Supervision for safety;Increased time   Sitting Lower Body Exercises   Sit to Stand 1 set of 10   Standing Lower Body Exercises   Heel Rise 1 set of 10;Bilateral   Bed Mobility    Supine to Sit Standby Assist   Sit to Stand Contact Guard Assist   Interdisciplinary Plan of Care Collaboration   Patient Position at End of Therapy Seated;Chair Alarm On;Self Releasing Lap Belt Applied;Call Light within Reach;Tray Table within Reach;Phone within Reach         Assessment    Time spent addressing strengthening was limited due to patient bargaining for coffee and time spent toileting. Patient's operation limits her strength with external rotation, passively falls into internal rotation requiring " pillow/ towel roll between knees to prevent adduction and internal rotation in seated, is unable to maintain corrections without blocking. She is self limiting/ bed seeking, continues to require cues for 1 of 3 hip precautions for recall, requires frequent cues for application of precautions during movement, mostly for ADD/IR.    Strengths: Independent prior level of function, Pleasant and cooperative  Barriers: Constipation, Decreased endurance, Fatigue, Generalized weakness, Impaired activity tolerance, Impaired balance, Impaired carryover of learning, Impaired insight/denial of deficits, Impaired functional cognition, Limited mobility, Pain    Plan    Gait with FWW, strengthening as able, balance as able, hip precaution recall    Passport items to be completed:  Get in/out of bed safely, in/out of a vehicle, safely use mobility device, walk or wheel around home/community, navigate up and down stairs, show how to get up/down from the ground, ensure home is accessible, demonstrate HEP, complete caregiver training    Physical Therapy Problems (Active)       Problem: Balance       Dates: Start:  06/13/23         Goal: STG-Within one week, patient will improve standing balance to tolerate static standing x 1 min on firm surface with contact guard assist while performing reaching task       Dates: Start:  06/13/23    Expected End:  06/30/23               Problem: Mobility       Dates: Start:  06/13/23         Goal: STG-Within one week, patient will ambulate 100 ft with FWW and contact guard assist       Dates: Start:  06/13/23    Expected End:  06/30/23            Goal: STG-Within one week, patient will ambulate up/down a 6 inch curb with FWW and min assist       Dates: Start:  06/13/23    Expected End:  06/30/23               Problem: Mobility Transfers       Dates: Start:  06/13/23         Goal: STG-Within one week, patient will perform bed mobility from flat bed with railing with contact guard assist       Dates:  Start:  06/13/23    Expected End:  06/30/23            Goal: STG-Within one week, patient will teach back fall recovery with minimal cuing (limited ability to actually perform due to posterior hip precautions)       Dates: Start:  06/13/23    Expected End:  06/30/23               Problem: PT-Long Term Goals       Dates: Start:  06/13/23         Goal: LTG-By discharge, patient will transfer one surface to another with FWW and supervision assist       Dates: Start:  06/13/23    Expected End:  06/30/23            Goal: LTG-By discharge, patient will perform home exercise program from handouts with set up assist       Dates: Start:  06/13/23    Expected End:  06/30/23            Goal: LTG-By discharge, patient will ambulate up/down flight of stairs with bilateral railing and supervision assist       Dates: Start:  06/13/23    Expected End:  06/30/23            Goal: LTG-By discharge, patient will transfer in/out of a car with supervision assist and FWW       Dates: Start:  06/13/23    Expected End:  06/30/23            Goal: LTG-By discharge, patient will teach back hip precautions 100% with no cuing       Dates: Start:  06/13/23    Expected End:  06/30/23

## 2023-06-20 NOTE — THERAPY
Speech Language Pathology  Daily Treatment     Patient Name: Chava Bray  Age:  87 y.o., Sex:  female  Medical Record #: [id number]  Today's Date: 6/20/2023     Precautions  Precautions: Fall Risk, Posterior Hip Precautions, Weight Bearing As Tolerated Left Lower Extremity  Comments: Hx Dementia, impaired safety    Subjective    Patient was willing to participate in ST.      Objective       06/20/23 1402   Treatment Charges   SLP Cognitive Skill Development First 15 Minutes 1   SLP Cognitive Skill Development Additional 15 Minutes 3   SLP Total Time Spent   SLP Individual Total Time Spent (Mins) 60   Cognition   Attention to Task Moderate (3)   Written Sequencing Severe (2)   Interdisciplinary Plan of Care Collaboration   Patient Position at End of Therapy In Bed;Bed Alarm On;Call Light within Reach;Tray Table within Reach         Assessment    Patient required max cues to appropriately sequence 4-step picture series.   Reviewed daily schedule and discussed types of therapies completed at rehab. Patient required mod-max cues to recall what therapies she has completed on this date.   Oriented to date.     Strengths: Willingly participates in therapeutic activities, Pleasant and cooperative, Effective communication skills  Barriers: Generalized weakness, Impaired carryover of learning, Impaired insight/denial of deficits    Plan    Continue to address basic attention, memory and problem solving.       Speech Therapy Problems (Active)       Problem: Problem Solving STGs       Dates: Start:  06/16/23         Goal: STG-Within one week, patient will complete SCCAN with additional goals as appropriate.        Dates: Start:  06/16/23    Expected End:  06/30/23            Goal: STG-Within one week, patient will answer basic problem solving questions with 70% accuracy given mod verbal cues.         Dates: Start:  06/16/23    Expected End:  06/30/23               Problem: Speech/Swallowing LTGs       Dates: Start:   06/16/23         Goal: LTG-By discharge, patient will solve basic problems Miki for safe discharge       Dates: Start:  06/16/23    Expected End:  06/30/23

## 2023-06-20 NOTE — THERAPY
"Occupational Therapy  Daily Treatment     Patient Name: Chava Bray  Age:  87 y.o., Sex:  female  Medical Record #: [id number]  Today's Date: 6/20/2023     Precautions  Precautions: (P) Fall Risk, Posterior Hip Precautions, Weight Bearing As Tolerated Left Lower Extremity  Comments: (P) Hx Dementia, impaired safety         Subjective    Pt stating she was \"very tired and my hip hurts\" upon arrival. Explained that we could try in bed exercises. Part way through 1 exercise, pt requesting apple juice and asking if she had medications this morning.     Objective       06/20/23 0931   OT Charge Group   OT Therapy Activity (Units) 1   OT Therapeutic Exercise (Units) 1   OT Total Time Spent   OT Individual Total Time Spent (Mins) 30   Precautions   Precautions Fall Risk;Posterior Hip Precautions;Weight Bearing As Tolerated Left Lower Extremity   Comments Hx Dementia, impaired safety   Functional Level of Assist   Eating Supervision  (set-up A for opening juice package)   Supine Upper Body Exercises   Chest Press 3 sets of 15;Right;Left;Weight (See Comments for lbs)  (1# weight)   Interdisciplinary Plan of Care Collaboration   Patient Position at End of Therapy In Bed;Bed Alarm On;Call Light within Reach;Tray Table within Reach;Phone within Reach         Assessment    Pt tolerated session, though increased time spent collaborating with nursing to clarify when pt took medications to assist with overall orientation. Pt complete exercise with 1 step direction and visual demonstration. Pt able to completely sit upright when drinking juice this date though req some cueing from bed to imitate.     Strengths: Motivated for self care and independence, Pleasant and cooperative, Supportive family, Willingly participates in therapeutic activities  Barriers: Decreased endurance, Fatigue, Generalized weakness, Home accessibility, Impaired activity tolerance, Impaired balance, Impaired carryover of learning, Impaired insight/denial " of deficits, Impaired functional cognition, Limited mobility, Pain, Pain poorly managed    Plan    ADLs w/ AE education as able, functional mobility/transfers, strength/endurance, balance, functional cog. Contact SO re: bathroom set up/equipment needs      Occupational Therapy Goals (Active)       Problem: Bathing       Dates: Start:  06/13/23         Goal: STG-Within one week, patient will bathe with min A using AE as needed to maintain hip prec.        Dates: Start:  06/13/23    Expected End:  06/30/23               Problem: Dressing       Dates: Start:  06/13/23         Goal: STG-Within one week, patient will dress LB with min A using AE as needed to maintain hip prec.        Dates: Start:  06/13/23    Expected End:  06/30/23               Problem: OT Long Term Goals       Dates: Start:  06/13/23         Goal: LTG-By discharge, patient will complete basic self care tasks at supv to mod I level using AE as needed to maintain hip prec.        Dates: Start:  06/13/23    Expected End:  06/30/23            Goal: LTG-By discharge, patient will perform bathroom transfers at supv to mod I level using AD/DME as needed.        Dates: Start:  06/13/23    Expected End:  06/30/23               Problem: Toileting       Dates: Start:  06/13/23         Goal: STG-Within one week, patient will complete toileting tasks with min A using AE as needed.        Dates: Start:  06/13/23    Expected End:  06/30/23

## 2023-06-21 ENCOUNTER — APPOINTMENT (OUTPATIENT)
Dept: PHYSICAL THERAPY | Facility: REHABILITATION | Age: 88
DRG: 560 | End: 2023-06-21
Attending: PHYSICAL MEDICINE & REHABILITATION
Payer: MEDICARE

## 2023-06-21 ENCOUNTER — HOSPITAL ENCOUNTER (OUTPATIENT)
Dept: RADIOLOGY | Facility: MEDICAL CENTER | Age: 88
End: 2023-06-21

## 2023-06-21 ENCOUNTER — APPOINTMENT (OUTPATIENT)
Dept: OCCUPATIONAL THERAPY | Facility: REHABILITATION | Age: 88
DRG: 560 | End: 2023-06-21
Attending: PHYSICAL MEDICINE & REHABILITATION
Payer: MEDICARE

## 2023-06-21 ENCOUNTER — APPOINTMENT (OUTPATIENT)
Dept: SPEECH THERAPY | Facility: REHABILITATION | Age: 88
DRG: 560 | End: 2023-06-21
Attending: PHYSICAL MEDICINE & REHABILITATION
Payer: MEDICARE

## 2023-06-21 PROCEDURE — 97535 SELF CARE MNGMENT TRAINING: CPT

## 2023-06-21 PROCEDURE — A9270 NON-COVERED ITEM OR SERVICE: HCPCS | Performed by: PHYSICAL MEDICINE & REHABILITATION

## 2023-06-21 PROCEDURE — 700111 HCHG RX REV CODE 636 W/ 250 OVERRIDE (IP): Performed by: PHYSICAL MEDICINE & REHABILITATION

## 2023-06-21 PROCEDURE — 97116 GAIT TRAINING THERAPY: CPT

## 2023-06-21 PROCEDURE — 770010 HCHG ROOM/CARE - REHAB SEMI PRIVAT*

## 2023-06-21 PROCEDURE — 700102 HCHG RX REV CODE 250 W/ 637 OVERRIDE(OP): Performed by: PHYSICAL MEDICINE & REHABILITATION

## 2023-06-21 PROCEDURE — 99232 SBSQ HOSP IP/OBS MODERATE 35: CPT | Performed by: PHYSICAL MEDICINE & REHABILITATION

## 2023-06-21 PROCEDURE — 97130 THER IVNTJ EA ADDL 15 MIN: CPT

## 2023-06-21 PROCEDURE — 97129 THER IVNTJ 1ST 15 MIN: CPT

## 2023-06-21 PROCEDURE — 94760 N-INVAS EAR/PLS OXIMETRY 1: CPT

## 2023-06-21 RX ORDER — DONEPEZIL HYDROCHLORIDE 5 MG/1
5 TABLET, FILM COATED ORAL EVERY EVENING
Status: DISCONTINUED | OUTPATIENT
Start: 2023-06-21 | End: 2023-06-26 | Stop reason: HOSPADM

## 2023-06-21 RX ADMIN — DONEPEZIL HYDROCHLORIDE 5 MG: 5 TABLET, FILM COATED ORAL at 21:07

## 2023-06-21 RX ADMIN — LISINOPRIL 5 MG: 5 TABLET ORAL at 08:41

## 2023-06-21 RX ADMIN — OXYCODONE 5 MG: 5 TABLET ORAL at 08:41

## 2023-06-21 RX ADMIN — TRAZODONE HYDROCHLORIDE 50 MG: 50 TABLET ORAL at 21:07

## 2023-06-21 RX ADMIN — OXYCODONE HYDROCHLORIDE 10 MG: 10 TABLET ORAL at 11:44

## 2023-06-21 RX ADMIN — OMEPRAZOLE 20 MG: 20 CAPSULE, DELAYED RELEASE ORAL at 08:41

## 2023-06-21 RX ADMIN — SENNOSIDES AND DOCUSATE SODIUM 2 TABLET: 50; 8.6 TABLET ORAL at 19:44

## 2023-06-21 ASSESSMENT — PAIN DESCRIPTION - PAIN TYPE
TYPE: ACUTE PAIN

## 2023-06-21 ASSESSMENT — GAIT ASSESSMENTS
ASSISTIVE DEVICE: FRONT WHEEL WALKER
DISTANCE (FEET): 50
GAIT LEVEL OF ASSIST: CONTACT GUARD ASSIST

## 2023-06-21 ASSESSMENT — ACTIVITIES OF DAILY LIVING (ADL)
TOILETING_LEVEL_OF_ASSIST_DESCRIPTION: ADAPTIVE EQUIPMENT;GRAB BAR;INCREASED TIME;SUPERVISION FOR SAFETY
BED_CHAIR_WHEELCHAIR_TRANSFER_DESCRIPTION: INCREASED TIME;INITIAL PREPARATION FOR TASK;SUPERVISION FOR SAFETY;VERBAL CUEING
TOILET_TRANSFER_DESCRIPTION: ADAPTIVE EQUIPMENT;GRAB BAR;INCREASED TIME;SET-UP OF EQUIPMENT;SUPERVISION FOR SAFETY;VERBAL CUEING
BED_CHAIR_WHEELCHAIR_TRANSFER_DESCRIPTION: ADAPTIVE EQUIPMENT;INCREASED TIME;SET-UP OF EQUIPMENT;SUPERVISION FOR SAFETY;VERBAL CUEING
TUB_SHOWER_TRANSFER_DESCRIPTION: GRAB BAR;SHOWER BENCH;INCREASED TIME;SET-UP OF EQUIPMENT;SUPERVISION FOR SAFETY;VERBAL CUEING

## 2023-06-21 ASSESSMENT — PAIN SCALES - PAIN ASSESSMENT IN ADVANCED DEMENTIA (PAINAD)
TOTALSCORE: 0
CONSOLABILITY: NO NEED TO CONSOLE
FACIALEXPRESSION: SMILING OR INEXPRESSIVE
BREATHING: NORMAL
BODYLANGUAGE: RELAXED

## 2023-06-21 ASSESSMENT — PAIN SCALES - WONG BAKER: WONGBAKER_NUMERICALRESPONSE: HURTS JUST A LITTLE BIT

## 2023-06-21 NOTE — THERAPY
Speech Language Pathology  Daily Treatment     Patient Name: Chava Bray  Age:  87 y.o., Sex:  female  Medical Record #: [id number]  Today's Date: 6/21/2023     Precautions  Precautions: Fall Risk, Posterior Hip Precautions, Weight Bearing As Tolerated Left Lower Extremity  Comments: Hx Dementia, impaired safety    Subjective    Patient participated in speech therapy.      Objective       06/21/23 0902   Treatment Charges   SLP Cognitive Skill Development First 15 Minutes 1   SLP Cognitive Skill Development Additional 15 Minutes 3   SLP Total Time Spent   SLP Individual Total Time Spent (Mins) 60   Cognition   Attention to Task Moderate (3)   Moderate Attention Severe (2)   Functional Memory Activities Moderate (3)   Interdisciplinary Plan of Care Collaboration   Patient Position at End of Therapy Seated;Chair Alarm On;Self Releasing Lap Belt Applied;Tray Table within Reach;Call Light within Reach         Assessment    Patient refused all structured tasks presented. Ongoing education related to hip precautions. Patient was not able to recall precautions and refused much of the education provided (I.e. use of towel roll between legs). Continued education related to memory strategies, Patient continued to refuse use of memory log. Poor insight to cognitive deficits with pain and level of participation as barriers during session.   Mod cues needed for safety and sequencing during toilet transfer.     Strengths: Willingly participates in therapeutic activities, Pleasant and cooperative, Effective communication skills  Barriers: Generalized weakness, Impaired carryover of learning, Impaired insight/denial of deficits    Plan    Basic attention and problem solving, memory.       Speech Therapy Problems (Active)       Problem: Problem Solving STGs       Dates: Start:  06/16/23         Goal: STG-Within one week, patient will complete SCCAN with additional goals as appropriate.        Dates: Start:  06/16/23     Expected End:  06/30/23            Goal: STG-Within one week, patient will answer basic problem solving questions with 70% accuracy given mod verbal cues.         Dates: Start:  06/16/23    Expected End:  06/30/23               Problem: Speech/Swallowing LTGs       Dates: Start:  06/16/23         Goal: LTG-By discharge, patient will solve basic problems Miki for safe discharge       Dates: Start:  06/16/23    Expected End:  06/30/23

## 2023-06-21 NOTE — CARE PLAN
Problem: VTE Prevention  Goal: Patient will remain free from venous thromboembolism (VTE)  Outcome: Progressing  Note: Pt is up and walking, participates in therapies, and up to dining room for all meals.    The patient is Stable - Low risk of patient condition declining or worsening    Shift Goals  Clinical Goals: safety, pain  management  Patient Goals: sleep/rest, safety, pain management  Family Goals: none present    Progress made toward(s) clinical / shift goals:  no s/s dvt    Patient is not progressing towards the following goals:

## 2023-06-21 NOTE — PROGRESS NOTES
"  Physical Medicine & Rehabilitation Progress Note    Encounter Date: 6/21/2023    Chief Complaint: Decreased mobility, constipation    Interval Events (Subjective):  Patient sitting up in room. She reports therapy is going well. Denies NVD. Denies SOB.     _____________________________________  Interdisciplinary Team Conference   Most recent IDT on 6/15/2023    Discharge Date/Disposition:  6/26/23  _____________________________________      Objective:  VITAL SIGNS: /65   Pulse 84   Temp 36.5 °C (97.7 °F) (Oral)   Resp 18   Ht 1.6 m (5' 3\")   Wt 52.2 kg (115 lb)   SpO2 93%   BMI 20.37 kg/m²   Gen: NAD  Psych: Mood and affect appropriate  CV: RRR, 0 edema  Resp: CTAB, no upper airway sounds  Abd: NTND  Neuro: AOx3, following commands    Laboratory Values:  No results found for this or any previous visit (from the past 72 hour(s)).      Medications:  Scheduled Medications   Medication Dose Frequency    lisinopril  5 mg DAILY    senna-docusate  2 Tablet BID    And    polyethylene glycol/lytes  1 Packet DAILY    Pharmacy Consult Request  1 Each PHARMACY TO DOSE    omeprazole  20 mg DAILY    enoxaparin (LOVENOX) injection  40 mg DAILY AT 1800     PRN medications: senna-docusate **AND** polyethylene glycol/lytes **AND** magnesium hydroxide **AND** bisacodyl, Respiratory Therapy Consult, hydrALAZINE, acetaminophen, mag hydrox-al hydrox-simeth, ondansetron **OR** ondansetron, traZODone, sodium chloride, oxyCODONE immediate-release **OR** oxyCODONE immediate-release    Diet:  Current Diet Order   Procedures    Diet Order Diet: Regular       Medical Decision Making and Plan:  Left hip fracture - Patient with mechanical fall at home with left hip fracture s/p hemiarthroplasty on 6/9/23 with Dr. Becker. Patient is WBAT, posterior precautions.   -PT and OT for mobility and ADLs. Per guidelines, 15 hours per week between PT, OT and/or SLP.  -Follow-up Dr. Becker     HTN - Previously on Lisinopril but low SBP after " fall. Will monitor. Into 110s, will monitor. Occasiona low 140s, will start 5 mg Lisinopril. Continue Lisinopril      Azotemia - Worsening after surgery. Check AM CMP - improved to 15     Hypothyroidism - Patient was on Levothyroxine in the past. Currently not on it. Will check TSH - wnl      Neuropathic pain - Patient with history of neuropathic pain from spinal stenosis. Previously on Gabapentin     Dysuria - Developed dysuria, UA + for UTI. Start Omnicef and send for cultures. PharmD recommending change to Macrobid. Ucx with E coli, completed macrobid. Resolved    Pain - Patient on Tylenol scheduled and PRN oxycodone. Discontinue Tylenol     Dementia - Does not appear to be on any medications. Will consult SLP. Start Aricept 5 mg QHS    Skin - Patient at risk for skin breakdown due to debility in areas including sacrum, achilles, elbows and head in addition to other sites. Nursing to assess skin daily.      GI Ppx - Patient on Prilosec for GERD prophylaxis. Patient on Senna-docusate for constipation prophylaxis.   -Severe constipation, mild distention, give Miralax. KUB on 6/13/23 - mild constipation in distal colon. Continue PPI and miralax    DVT Ppx - Patient Lovenox on transfer.  ____________________________________    T. French John MD/PhD  [facility] - Physical Medicine & Rehabilitation   [facility] - Brain Injury Medicine   ____________________________________

## 2023-06-21 NOTE — THERAPY
"Physical Therapy   Daily Treatment     Patient Name: Chava Bray  Age:  87 y.o., Sex:  female  Medical Record #: [id number]  Today's Date: 6/21/2023     Precautions  Precautions: Fall Risk, Posterior Hip Precautions, Weight Bearing As Tolerated Left Lower Extremity  Comments: Hx Dementia, impaired safety    Subjective    Patient is found seated up in chair, agreeable to participate. Doesn't have towel roll block between knees, is internally rotated and adducted while seated in chair.      Objective       06/21/23 1031   PT Charge Group   PT Gait Training (Units) 4   PT Total Time Spent   PT Individual Total Time Spent (Mins) 60   Gait Functional Level of Assist    Gait Level Of Assist Contact Guard Assist   Assistive Device Front Wheel Walker   Distance (Feet) 50   # of Times Distance was Traveled 6  (multiple rest breaks, requires cues for using FWW, has difficulty with using BUEs to offload. Drifts to R, requires cues to correct)   Deviation Antalgic;Step To;Decreased Base Of Support;Bradykinetic;Decreased Toe Off;Decreased Heel Strike   Transfer Functional Level of Assist   Bed, Chair, Wheelchair Transfer Minimal Assist   Bed Chair Wheelchair Transfer Description Increased time;Initial preparation for task;Supervision for safety;Verbal cueing   Bed Mobility    Sit to Stand Minimal Assist  (requires less help as she continues with sit <> stand between bouts of walking)   Interdisciplinary Plan of Care Collaboration   IDT Collaboration with  Nursing   Patient Position at End of Therapy Seated;Chair Alarm On;Self Releasing Lap Belt Applied;Call Light within Reach;Tray Table within Reach;Phone within Reach   Collaboration Comments pain, question elevating leg rest vs normal leg rest, see assessment.     Spent approx 10 minutes reviewing hip precautions. Continues to require cues for 1/3 precautions, is most consistently able to remember \"don't turn the toes in, and don't turn the knee in\", requires cues for " "\"how much can you bend at the hip?\" Is able to correctly states \"90 degrees\", requires cues to be able to know what that means for when she's seated in chair as she attempts to lean forward frequently.    Was left seated up in wheelchair in dining room for lunch    Assessment    RN asked regarding elevating leg rest for patient; as she has a tendency to internally rotate and adduct in supine and seated, elevating the leg rest would allow her to either slide off the leg rest or to break 90 degree hip flexion precaution with any kind of forward trunk flexion. She is a high risk for dislocating anyway, and will be better able to keep towel roll block between knees with knees bent on normal leg rest. Patient was able to ambulate limited household distances in gym, requires consistent cues for hand placement during sit <> stand ( R hand on wheelchair, LLE kicked out, LUE on walker). Was able to come into standing posture more easily as we continued walking. She demonstrates intermittent step to and step through gait patterns, is limited by pain, offloading at BUEs, and how long of a step she takes with the LLE.    Strengths: Independent prior level of function, Pleasant and cooperative  Barriers: Constipation, Decreased endurance, Fatigue, Generalized weakness, Impaired activity tolerance, Impaired balance, Impaired carryover of learning, Impaired insight/denial of deficits, Impaired functional cognition, Limited mobility, Pain    Plan    Gait with FWW, strengthening as able, balance as able, hip precaution recall    Passport items to be completed:  Get in/out of bed safely, in/out of a vehicle, safely use mobility device, walk or wheel around home/community, navigate up and down stairs, show how to get up/down from the ground, ensure home is accessible, demonstrate HEP, complete caregiver training    Physical Therapy Problems (Active)       Problem: Balance       Dates: Start:  06/13/23         Goal: STG-Within one " week, patient will improve standing balance to tolerate static standing x 1 min on firm surface with contact guard assist while performing reaching task       Dates: Start:  06/13/23    Expected End:  06/30/23               Problem: Mobility       Dates: Start:  06/13/23         Goal: STG-Within one week, patient will ambulate 100 ft with FWW and contact guard assist       Dates: Start:  06/13/23    Expected End:  06/30/23            Goal: STG-Within one week, patient will ambulate up/down a 6 inch curb with FWW and min assist       Dates: Start:  06/13/23    Expected End:  06/30/23               Problem: Mobility Transfers       Dates: Start:  06/13/23         Goal: STG-Within one week, patient will perform bed mobility from flat bed with railing with contact guard assist       Dates: Start:  06/13/23    Expected End:  06/30/23            Goal: STG-Within one week, patient will teach back fall recovery with minimal cuing (limited ability to actually perform due to posterior hip precautions)       Dates: Start:  06/13/23    Expected End:  06/30/23               Problem: PT-Long Term Goals       Dates: Start:  06/13/23         Goal: LTG-By discharge, patient will transfer one surface to another with FWW and supervision assist       Dates: Start:  06/13/23    Expected End:  06/30/23            Goal: LTG-By discharge, patient will perform home exercise program from handouts with set up assist       Dates: Start:  06/13/23    Expected End:  06/30/23            Goal: LTG-By discharge, patient will ambulate up/down flight of stairs with bilateral railing and supervision assist       Dates: Start:  06/13/23    Expected End:  06/30/23            Goal: LTG-By discharge, patient will transfer in/out of a car with supervision assist and FWW       Dates: Start:  06/13/23    Expected End:  06/30/23            Goal: LTG-By discharge, patient will teach back hip precautions 100% with no cuing       Dates: Start:  06/13/23     Expected End:  06/30/23

## 2023-06-21 NOTE — CARE PLAN
"The patient is Stable - Low risk of patient condition declining or worsening    Shift Goals  Clinical Goals: safety, pain  management  Patient Goals: sleep/rest, safety, pain management  Family Goals: none present      Problem: Fall Risk - Rehab  Goal: Patient will remain free from falls  Outcome: Progressing  Note: Brianna Brady Fall risk Assessment Score: 25    High fall risk Interventions   - Alarming seatbelt  - Bed and strip alarm   - Yellow sign by the door   - Yellow wrist band \"Fall risk\"  - Room near to the nurse station  - Do not leave patient unattended in the bathroom  - Fall risk education provided    Episode of increased impulsiveness and transfer to  with alarm in place and working. Stroll around the hallway with staff and given some snacks to eat. Medicated with Trazadone and Oycodone for left hip surgical incision. Bed alarm in place to alert staff for fall prevention. Call light within reach and reminded pt to used when in need of help. Continue on close monitoring.            Problem: Skin Integrity  Goal: Patient's skin integrity will be maintained or improve  Outcome: Progressing  Note: Left hip incision with staples c/d/I and open to air. No evidence of any drainage or bleeding noted. Skin tear to LUE with dressing in place. Patient's skin remains intact and free from new or accidental injury this shift.  Will continue to monitor.        "

## 2023-06-21 NOTE — THERAPY
"Occupational Therapy  Daily Treatment     Patient Name: Chava Bray  Age:  87 y.o., Sex:  female  Medical Record #: [id number]  Today's Date: 6/21/2023     Precautions  Precautions: (P) Fall Risk, Posterior Hip Precautions, Weight Bearing As Tolerated Left Lower Extremity  Comments: (P) Hx Dementia, impaired safety         Subjective    \"Can I wash my hair?\"  \"I can't believe I fell off the toilet\"--pt recalling her fall and reason for hospitalization      Objective       06/21/23 1401   OT Charge Group   OT Self Care / ADL (Units) 4   OT Total Time Spent   OT Individual Total Time Spent (Mins) 60   Precautions   Precautions Fall Risk;Posterior Hip Precautions;Weight Bearing As Tolerated Left Lower Extremity   Comments Hx Dementia, impaired safety   Cognition    Level of Consciousness Alert   Functional Level of Assist   Grooming Modified Independent;Seated   Bathing Contact Guard Assist   Bathing Description Grab bar;Hand held shower;Tub bench;Long handled bath tool;Increased time;Set-up of equipment;Supervision for safety  (CGA/GB for standing balance, cues for hip prec, supv/set up for seated portions)   Upper Body Dressing Supervision   Upper Body Dressing Description Set-up of equipment;Increased time   Lower Body Dressing Minimal Assist   Lower Body Dressing Description Reacher;Increased time;Initial preparation for task;Set-up of equipment;Supervision for safety;Verbal cueing;Sock aid   Toileting Standby Assist   Toileting Description Adaptive equipment;Grab bar;Increased time;Supervision for safety   Bed, Chair, Wheelchair Transfer Minimal Assist   Bed Chair Wheelchair Transfer Description Adaptive equipment;Increased time;Set-up of equipment;Supervision for safety;Verbal cueing  (FWW)   Toilet Transfers Contact Guard Assist   Toilet Transfer Description Adaptive equipment;Grab bar;Increased time;Set-up of equipment;Supervision for safety;Verbal cueing  (FWW level)   Tub / Shower Transfers Contact " Guard Assist   Tub Shower Transfer Description Grab bar;Shower bench;Increased time;Set-up of equipment;Supervision for safety;Verbal cueing  (FWW level)   Bed Mobility    Supine to Sit Standby Assist   Sit to Supine Standby Assist   Scooting Standby Assist   Rolling Standby Assist   Interdisciplinary Plan of Care Collaboration   Patient Position at End of Therapy In Bed;Bed Alarm On;Call Light within Reach;Tray Table within Reach;Phone within Reach         Assessment    Pt progressing with ADLs and bathroom txfrs from FWW level. She con't to require increased time 2/2 pain, and cues for carryover with posterior hip prec. Pt reports that her spouse is having GB installed in her bathroom at home.   Strengths: Motivated for self care and independence, Pleasant and cooperative, Supportive family, Willingly participates in therapeutic activities  Barriers: Decreased endurance, Fatigue, Generalized weakness, Home accessibility, Impaired activity tolerance, Impaired balance, Impaired carryover of learning, Impaired insight/denial of deficits, Impaired functional cognition, Limited mobility, Pain, Pain poorly managed    Plan    ADLs w/ AE education as able, functional mobility/transfers, strength/endurance, balance, functional cog. Contact SO re: bathroom set up/equipment needs.    Occupational Therapy Goals (Active)       Problem: Bathing       Dates: Start:  06/13/23         Goal: STG-Within one week, patient will bathe with min A using AE as needed to maintain hip prec.        Dates: Start:  06/13/23    Expected End:  06/30/23               Problem: Dressing       Dates: Start:  06/13/23         Goal: STG-Within one week, patient will dress LB with min A using AE as needed to maintain hip prec.        Dates: Start:  06/13/23    Expected End:  06/30/23               Problem: OT Long Term Goals       Dates: Start:  06/13/23         Goal: LTG-By discharge, patient will complete basic self care tasks at supv to mod I level  using AE as needed to maintain hip prec.        Dates: Start:  06/13/23    Expected End:  06/30/23            Goal: LTG-By discharge, patient will perform bathroom transfers at supv to mod I level using AD/DME as needed.        Dates: Start:  06/13/23    Expected End:  06/30/23               Problem: Toileting       Dates: Start:  06/13/23         Goal: STG-Within one week, patient will complete toileting tasks with min A using AE as needed.        Dates: Start:  06/13/23    Expected End:  06/30/23

## 2023-06-21 NOTE — FLOWSHEET NOTE
06/21/23 0855   Events/Summary/Plan   Events/Summary/Plan RA pulse ox check. Sitting up in wheelchair   Vital Signs   Pulse 84   Respiration 18   Pulse Oximetry 93 %   $ Pulse Oximetry (Spot Check) Yes   Respiratory Assessment   Respiratory Pattern Within Normal Limits   Level of Consciousness Alert   Oxygen   O2 Delivery Device Room air w/o2 available

## 2023-06-22 ENCOUNTER — APPOINTMENT (OUTPATIENT)
Dept: PHYSICAL THERAPY | Facility: REHABILITATION | Age: 88
DRG: 560 | End: 2023-06-22
Attending: PHYSICAL MEDICINE & REHABILITATION
Payer: MEDICARE

## 2023-06-22 ENCOUNTER — APPOINTMENT (OUTPATIENT)
Dept: OCCUPATIONAL THERAPY | Facility: REHABILITATION | Age: 88
DRG: 560 | End: 2023-06-22
Attending: PHYSICAL MEDICINE & REHABILITATION
Payer: MEDICARE

## 2023-06-22 ENCOUNTER — APPOINTMENT (OUTPATIENT)
Dept: SPEECH THERAPY | Facility: REHABILITATION | Age: 88
DRG: 560 | End: 2023-06-22
Attending: PHYSICAL MEDICINE & REHABILITATION
Payer: MEDICARE

## 2023-06-22 PROCEDURE — 97116 GAIT TRAINING THERAPY: CPT

## 2023-06-22 PROCEDURE — 97130 THER IVNTJ EA ADDL 15 MIN: CPT

## 2023-06-22 PROCEDURE — 97530 THERAPEUTIC ACTIVITIES: CPT

## 2023-06-22 PROCEDURE — 700102 HCHG RX REV CODE 250 W/ 637 OVERRIDE(OP): Performed by: PHYSICAL MEDICINE & REHABILITATION

## 2023-06-22 PROCEDURE — 97129 THER IVNTJ 1ST 15 MIN: CPT

## 2023-06-22 PROCEDURE — A9270 NON-COVERED ITEM OR SERVICE: HCPCS | Performed by: PHYSICAL MEDICINE & REHABILITATION

## 2023-06-22 PROCEDURE — 700111 HCHG RX REV CODE 636 W/ 250 OVERRIDE (IP): Performed by: PHYSICAL MEDICINE & REHABILITATION

## 2023-06-22 PROCEDURE — 99232 SBSQ HOSP IP/OBS MODERATE 35: CPT | Performed by: PHYSICAL MEDICINE & REHABILITATION

## 2023-06-22 PROCEDURE — 97535 SELF CARE MNGMENT TRAINING: CPT

## 2023-06-22 PROCEDURE — 770010 HCHG ROOM/CARE - REHAB SEMI PRIVAT*

## 2023-06-22 RX ADMIN — OXYCODONE HYDROCHLORIDE 10 MG: 10 TABLET ORAL at 16:59

## 2023-06-22 RX ADMIN — ENOXAPARIN SODIUM 40 MG: 100 INJECTION SUBCUTANEOUS at 17:00

## 2023-06-22 RX ADMIN — OXYCODONE HYDROCHLORIDE 10 MG: 10 TABLET ORAL at 07:57

## 2023-06-22 RX ADMIN — DONEPEZIL HYDROCHLORIDE 5 MG: 5 TABLET, FILM COATED ORAL at 20:07

## 2023-06-22 RX ADMIN — OMEPRAZOLE 20 MG: 20 CAPSULE, DELAYED RELEASE ORAL at 07:56

## 2023-06-22 RX ADMIN — LISINOPRIL 5 MG: 5 TABLET ORAL at 07:56

## 2023-06-22 RX ADMIN — SENNOSIDES AND DOCUSATE SODIUM 2 TABLET: 50; 8.6 TABLET ORAL at 20:06

## 2023-06-22 RX ADMIN — OXYCODONE HYDROCHLORIDE 10 MG: 10 TABLET ORAL at 20:07

## 2023-06-22 RX ADMIN — TRAZODONE HYDROCHLORIDE 50 MG: 50 TABLET ORAL at 20:07

## 2023-06-22 RX ADMIN — OXYCODONE HYDROCHLORIDE 10 MG: 10 TABLET ORAL at 11:46

## 2023-06-22 ASSESSMENT — ACTIVITIES OF DAILY LIVING (ADL)
BED_CHAIR_WHEELCHAIR_TRANSFER_DESCRIPTION: ADAPTIVE EQUIPMENT;INCREASED TIME;SUPERVISION FOR SAFETY;VERBAL CUEING
TUB_SHOWER_TRANSFER_DESCRIPTION: ADAPTIVE EQUIPMENT;SHOWER BENCH;INCREASED TIME;SET-UP OF EQUIPMENT;SUPERVISION FOR SAFETY
BED_CHAIR_WHEELCHAIR_TRANSFER_DESCRIPTION: INCREASED TIME
TOILET_TRANSFER_DESCRIPTION: ADAPTIVE EQUIPMENT;GRAB BAR;SUPERVISION FOR SAFETY;VERBAL CUEING
TOILETING_LEVEL_OF_ASSIST_DESCRIPTION: ASSIST FOR STANDING BALANCE;GRAB BAR;INCREASED TIME;SUPERVISION FOR SAFETY
TOILET_TRANSFER_DESCRIPTION: ADAPTIVE EQUIPMENT;GRAB BAR;INCREASED TIME

## 2023-06-22 ASSESSMENT — GAIT ASSESSMENTS
DISTANCE (FEET): 125
ASSISTIVE DEVICE: FRONT WHEEL WALKER
ASSISTIVE DEVICE: FRONT WHEEL WALKER
GAIT LEVEL OF ASSIST: CONTACT GUARD ASSIST
DISTANCE (FEET): 25
GAIT LEVEL OF ASSIST: CONTACT GUARD ASSIST

## 2023-06-22 NOTE — THERAPY
Speech Language Pathology  Daily Treatment     Patient Name: Chava Bray  Age:  87 y.o., Sex:  female  Medical Record #: [id number]  Today's Date: 6/22/2023     Precautions  Precautions: Fall Risk, Posterior Hip Precautions, Weight Bearing As Tolerated Left Lower Extremity  Comments: Hx Dementia, impaired safety    Subjective    Patient was willing to participate. Spouse present during session.     Objective       06/22/23 1032   Treatment Charges   SLP Cognitive Skill Development First 15 Minutes 1   SLP Cognitive Skill Development Additional 15 Minutes 1   SLP Total Time Spent   SLP Individual Total Time Spent (Mins) 30   Interdisciplinary Plan of Care Collaboration   Patient Position at End of Therapy Seated;Chair Alarm On;Call Light within Reach;Phone within Reach;Family / Friend in Room         Assessment    Initiated family training with SO. Discussed memory strategies and strategies for redirection. Spouse has noticed some sundowning via phone calls from the patient in the evening.   Discussed hip precautions, however patient continues to refuse education.     Strengths: Willingly participates in therapeutic activities, Pleasant and cooperative, Effective communication skills  Barriers: Generalized weakness, Impaired carryover of learning, Impaired insight/denial of deficits    Plan    Ongoing Family training, basic attention and safety.       Speech Therapy Problems (Active)       Problem: Problem Solving STGs       Dates: Start:  06/16/23         Goal: STG-Within one week, patient will complete SCCAN with additional goals as appropriate.        Dates: Start:  06/16/23    Expected End:  06/30/23            Goal: STG-Within one week, patient will answer basic problem solving questions with 70% accuracy given mod verbal cues.         Dates: Start:  06/16/23    Expected End:  06/30/23               Problem: Speech/Swallowing LTGs       Dates: Start:  06/16/23         Goal: LTG-By discharge, patient will  solve basic problems Miki for safe discharge       Dates: Start:  06/16/23    Expected End:  06/30/23

## 2023-06-22 NOTE — DISCHARGE PLANNING
KUMAR    Home health sent to [facility].     Dme sent to [facility]. [facility] declined because they delivered a walker to the patient in March of this year.

## 2023-06-22 NOTE — THERAPY
Occupational Therapy  Daily Treatment     Patient Name: Chava Bray  Age:  87 y.o., Sex:  female  Medical Record #: [id number]  Today's Date: 6/22/2023     Precautions  Precautions: Fall Risk, Posterior Hip Precautions, Weight Bearing As Tolerated Left Lower Extremity  Comments: Hx Dementia, impaired safety         Subjective    Pt seen for OT from 11-11:45 with focus on family training, bathroom DME/equipment recommendations, and functional mobility w/ FWW; and from 14:30-14:45 with focus on toileting/ADLs.      Objective       06/22/23 1101   OT Charge Group   OT Self Care / ADL (Units) 2   OT Therapy Activity (Units) 2   OT Total Time Spent   OT Individual Total Time Spent (Mins) 60   Precautions   Precautions Fall Risk;Posterior Hip Precautions;Weight Bearing As Tolerated Left Lower Extremity   Comments Hx Dementia, impaired safety   Cognition    Level of Consciousness Alert   Functional Level of Assist   Grooming Supervision;Standing   Grooming Description Standing at sink  (hand hygiene standing at sink)   Toileting Minimal Assist  (CGA during PM session)   Toileting Description Assist for standing balance;Grab bar;Increased time;Supervision for safety  (min A for thoroughness w/ hygiene after BM)   Toilet Transfers Contact Guard Assist  (during AM and PM sessions)   Toilet Transfer Description Adaptive equipment;Grab bar;Increased time  (FWW)   Tub / Shower Transfers Contact Guard Assist   Tub Shower Transfer Description Adaptive equipment;Shower bench;Increased time;Set-up of equipment;Supervision for safety  (dry tub/shower txfr using FWW and tub txfr bench)   Interdisciplinary Plan of Care Collaboration   IDT Collaboration with  Family / Caregiver   Patient Position at End of Therapy Seated;Chair Alarm On;Self Releasing Lap Belt Applied;Call Light within Reach;Tray Table within Reach;Phone within Reach   Collaboration Comments spouse present for first session     11:00: discussed pt's CLOF w/ ADLs  and functional transfers, rehab goals/progress, benefits of home health therapies w/ pt and spouse. Pt able to name 2/3 hip prec with increased time.   Provided pt/spouse with cost/vendor information for tub txfr bench.   Functional mobility with FWW to increase safety and endurance for household mobility, ~50' x1 and 185' x1 with CGA, increased time.     14:30: pt completed toileting from FWW level as noted in flowsheet above. Pt also completed task of organizing closet from w/c level with supv.       Assessment    Pt with improved participation today, and progressing w/ ADLs and functional mobility from FWW level. Pt will benefit from getting a tub txfr bench for safety w/ bathing at home, and spouse plans to order from [employer]. Pt con't to be limited by pain, impaired cog/carryover with prec, and decreased endurance.   Strengths: Motivated for self care and independence, Pleasant and cooperative, Supportive family, Willingly participates in therapeutic activities  Barriers: Decreased endurance, Fatigue, Generalized weakness, Home accessibility, Impaired activity tolerance, Impaired balance, Impaired carryover of learning, Impaired insight/denial of deficits, Impaired functional cognition, Limited mobility, Pain, Pain poorly managed    Plan    ADLs w/ AE education as able, functional mobility/transfers, strength/endurance, balance, functional cog.     Occupational Therapy Goals (Active)       Problem: Functional Transfers       Dates: Start:  06/22/23         Goal: STG-Within one week, patient will transfer to tub/shower with SBA using AD/DME prn.        Dates: Start:  06/22/23               Problem: OT Long Term Goals       Dates: Start:  06/13/23         Goal: LTG-By discharge, patient will complete basic self care tasks at supv to mod I level using AE as needed to maintain hip prec.        Dates: Start:  06/13/23    Expected End:  06/30/23            Goal: LTG-By discharge, patient will perform bathroom  transfers at supv to mod I level using AD/DME as needed.        Dates: Start:  06/13/23    Expected End:  06/30/23               Problem: Toileting       Dates: Start:  06/22/23         Goal: STG-Within one week, patient will complete toileting tasks with SBA using AE prn.        Dates: Start:  06/22/23

## 2023-06-22 NOTE — CARE PLAN
Problem: Bathing  Goal: STG-Within one week, patient will bathe with min A using AE as needed to maintain hip prec.   Outcome: Met     Problem: Dressing  Goal: STG-Within one week, patient will dress LB with min A using AE as needed to maintain hip prec.   Outcome: Met     Problem: Toileting  Goal: STG-Within one week, patient will complete toileting tasks with min A using AE as needed.   Outcome: Met

## 2023-06-22 NOTE — CARE PLAN
Problem: Problem Solving STGs  Goal: STG-Within one week, patient will answer basic problem solving questions with 70% accuracy given mod verbal cues.    Outcome: Not Met

## 2023-06-22 NOTE — CARE PLAN
Problem: Mobility Transfers  Goal: STG-Within one week, patient will perform bed mobility from flat bed with railing with contact guard assist  Outcome: Not Met  Goal: STG-Within one week, patient will teach back fall recovery with minimal cuing (limited ability to actually perform due to posterior hip precautions)  Outcome: Not Met     Problem: Balance  Goal: STG-Within one week, patient will improve standing balance to tolerate static standing x 1 min on firm surface with contact guard assist while performing reaching task  6/22/2023 1338 by Buffy Esteban, PT  Outcome: Met  6/22/2023 0806 by Buffy Esteban, PT  Outcome: Not Met     Problem: Mobility  Goal: STG-Within one week, patient will ambulate 100 ft with FWW and contact guard assist  Outcome: Met  Goal: STG-Within one week, patient will ambulate up/down a 6 inch curb with FWW and min assist  6/22/2023 1338 by Buffy Esteban, PT  Outcome: Met  6/22/2023 0806 by Buffy Esteban, PT  Outcome: Not Met

## 2023-06-22 NOTE — CARE PLAN
"The patient is Stable - Low risk of patient condition declining or worsening    Shift Goals  Clinical Goals: safety, pain management  Patient Goals: sleep/rest, safety, pain management  Family Goals: none present    Problem: Fall Risk - Rehab  Goal: Patient will remain free from falls  Outcome: Progressing  Note: Brianna Brady Fall risk Assessment Score: 26    High fall risk Interventions   - Alarming seatbelt  - Bed and strip alarm   - Yellow sign by the door   - Yellow wrist band \"Fall risk\"  - Room near to the nurse station  - Do not leave patient unattended in the bathroom  - Fall risk education provided    Episode of impulsiveness tonight with bed alarm and chair alarm in place to alert staff.  Q 1 hour check for fall precaution. Toileted several time with frequency noted but no dysuria complaint. One person assist to and from the bathroom and bed. Call light within reach and reminded to use when in need of help. Continue on close monitoring.            Problem: Skin Integrity  Goal: Patient's skin integrity will be maintained or improve  6/22/2023 0126 by Rosa Brown, R.N.  Note: Post surgical incision to left hip with staples, c/d/I with no s/s of infections noted and PRAMOD. Patient's skin remains intact and free from new or accidental injury this shift.  Will continue to monitor.   6/22/2023 0109 by Rosa Brown, R.N.  Outcome: Progressing     "

## 2023-06-22 NOTE — PROGRESS NOTES
Physical Medicine & Rehabilitation Progress Note    Encounter Date: 6/22/2023    Chief Complaint: Decreased mobility, constipation    Interval Events (Subjective):  Patient sitting up in room. She reports therapy is going well. She denies pain. Denies NVD. Discussed would have repeat IDT later today to discuss discharge next week.     _____________________________________  Interdisciplinary Team Conference   Most recent IDT on 6/22/2023    IDoreen M.D./Ph.D., was present and led the interdisciplinary team conference on 6/22/2023.  I led the IDT conference and agree with the IDT conference documentation and plan of care as noted below.     Nursing:  Diet Current Diet Order   Procedures    Diet Order Diet: Regular       Eating ADL Supervision (set-up A for opening juice package)  Supervision for safety, Set-up of equipment or meal/tube feeding   % of Last Meal  Oral Nutrition: Breakfast, Between 25-50% Consumed   Sleep    Bowel Last BM: 06/22/23   Bladder    Barriers to Discharge Home:  Limited memory    Physical Therapy:  Bed Mobility    Transfers Minimal Assist  Adaptive equipment, Increased time, Supervision for safety, Verbal cueing   Mobility Contact Guard Assist   Stairs    Barriers to Discharge Home:  Memory  Self distracting/limiting    Much better     Occupational Therapy:  Grooming Supervision, Standing   Bathing Contact Guard Assist   UB Dressing Supervision   LB Dressing Minimal Assist   Toileting Minimal Assist   Shower & Transfer    Barriers to Discharge Home:  Cognition  Poor carry over    Speech-Language Pathology:  Comprehension:  Supervision  Comprehension Description:  Verbal cues  Expression:  Supervision  Expression Description:     Social Interaction:     Social Interaction Description:     Problem Solving:  Moderate Assist  Problem Solving Description:     Memory:  Maximal Assist  Memory Description:  Increased time, Therapy schedule, Verbal cueing  Barriers to Discharge  "Home:  Signed off    Rec Therapy:  Leisure     Case Management:  Continues to work on disposition and DME needs.      Discharge Date/Disposition:  6/26/23  _____________________________________      Objective:  VITAL SIGNS: /56   Pulse (!) 54   Temp 37.1 °C (98.7 °F) (Oral)   Resp 16   Ht 1.6 m (5' 3\")   Wt 52.2 kg (115 lb)   SpO2 93%   BMI 20.37 kg/m²   Gen: NAD  Psych: Mood and affect appropriate  CV: RRR, 0 edema  Resp: CTAB, no upper airway sounds  Abd: NTND  Neuro: AOx3, following commands  Unchanged from 6/21/23    Laboratory Values:  No results found for this or any previous visit (from the past 72 hour(s)).      Medications:  Scheduled Medications   Medication Dose Frequency    donepezil  5 mg Q EVENING    lisinopril  5 mg DAILY    senna-docusate  2 Tablet BID    And    polyethylene glycol/lytes  1 Packet DAILY    Pharmacy Consult Request  1 Each PHARMACY TO DOSE    omeprazole  20 mg DAILY    enoxaparin (LOVENOX) injection  40 mg DAILY AT 1800     PRN medications: senna-docusate **AND** polyethylene glycol/lytes **AND** magnesium hydroxide **AND** bisacodyl, Respiratory Therapy Consult, hydrALAZINE, acetaminophen, mag hydrox-al hydrox-simeth, ondansetron **OR** ondansetron, traZODone, sodium chloride, oxyCODONE immediate-release **OR** oxyCODONE immediate-release    Diet:  Current Diet Order   Procedures    Diet Order Diet: Regular       Medical Decision Making and Plan:  Left hip fracture - Patient with mechanical fall at home with left hip fracture s/p hemiarthroplasty on 6/9/23 with Dr. Becker. Patient is WBAT, posterior precautions.   -PT and OT for mobility and ADLs. Per guidelines, 15 hours per week between PT, OT and/or SLP.  -Follow-up Dr. Becker     HTN - Previously on Lisinopril but low SBP after fall. Will monitor. Into 110s, will monitor. Occasiona low 140s, will start 5 mg Lisinopril. Continue Lisinopril     Azotemia - Worsening after surgery. Check AM CMP - improved to 15   "   Hypothyroidism - Patient was on Levothyroxine in the past. Currently not on it. Will check TSH - wnl      Neuropathic pain - Patient with history of neuropathic pain from spinal stenosis. Previously on Gabapentin     Dysuria - Developed dysuria, UA + for UTI. Start Omnicef and send for cultures. PharmD recommending change to Macrobid. Ucx with E coli, completed macrobid. Resolved    Pain - Patient on Tylenol scheduled and PRN oxycodone. Discontinue Tylenol     Dementia - Does not appear to be on any medications. Will consult SLP. Start Aricept 5 mg QHS. Continue Aricept    Skin - Patient at risk for skin breakdown due to debility in areas including sacrum, achilles, elbows and head in addition to other sites. Nursing to assess skin daily.      GI Ppx - Patient on Prilosec for GERD prophylaxis. Patient on Senna-docusate for constipation prophylaxis.   -Severe constipation, mild distention, give Miralax. KUB on 6/13/23 - mild constipation in distal colon. Continue PPI and miralax    DVT Ppx - Patient Lovenox on transfer.  ____________________________________    T. French John MD/PhD  [facility] - Physical Medicine & Rehabilitation   [facility] - Brain Injury Medicine   ____________________________________

## 2023-06-22 NOTE — THERAPY
Physical Therapy   Daily Treatment     Patient Name: Chava Bray  Age:  87 y.o., Sex:  female  Medical Record #: [id number]  Today's Date: 6/22/2023     Precautions  Precautions: Fall Risk, Posterior Hip Precautions, Weight Bearing As Tolerated Left Lower Extremity  Comments: Hx Dementia, impaired safety    Subjective    Patient is found in bed for both AM and PM sessions, is agreeable to participate. This afternoon, thinks she's going home today.      Objective       06/22/23 0901 06/22/23 1531   PT Charge Group   PT Gait Training (Units) 2 2   PT Therapeutic Activities (Units) 2  --    PT Total Time Spent   PT Individual Total Time Spent (Mins) 60 30   Gait Functional Level of Assist    Gait Level Of Assist Contact Guard Assist Contact Guard Assist   Assistive Device Front Wheel Walker Front Wheel Walker   Distance (Feet) 25 125   # of Times Distance was Traveled 3 2   Deviation Antalgic;Step To;Decreased Base Of Support;Bradykinetic;Decreased Toe Off;Decreased Heel Strike Antalgic;Step To;Decreased Base Of Support;Bradykinetic;Decreased Toe Off;Decreased Heel Strike   Stairs Functional Level of Assist   Level of Assist with Stairs Minimal Assist  --    # of Stairs Climbed 1  (up and over 6 inch curb step)  --    Stairs Description Extra time;Hand rails;Supervision for safety;Verbal cueing  --    Transfer Functional Level of Assist   Bed, Chair, Wheelchair Transfer Minimal Assist Minimal Assist  (requires cues to not internally rotate at hip when moving out of bed, requires manual assist to keep leg externally rotated)   Bed Chair Wheelchair Transfer Description Adaptive equipment;Increased time;Supervision for safety;Verbal cueing Increased time   Toilet Transfers Contact Guard Assist Contact Guard Assist   Toilet Transfer Description Grab bar;Initial preparation for task;Increased time;Verbal cueing;Supervision for safety Adaptive equipment;Grab bar;Supervision for safety;Verbal cueing   Bed Mobility     Supine to Sit Minimal Assist  (needs assist to move LLE due to pain/ soreness) Minimal Assist   Sit to Supine  --  Contact Guard Assist  (slight assist to lift leg into bed, more compliant with hip precautions with sit to supine)   Sit to Stand Minimal Assist Contact Guard Assist   Interdisciplinary Plan of Care Collaboration   IDT Collaboration with  Certified Nursing Assistant  --    Patient Position at End of Therapy Seated;Chair Alarm On;Self Releasing Lap Belt Applied;Call Light within Reach;Tray Table within Reach;Phone within Reach In Bed;Bed Alarm On;Call Light within Reach;Tray Table within Reach;Phone within Reach   Collaboration Comments CNA aware patient had medium soft bowel movement, is seated in chair in preparation for SLP  --          Assessment    Patient is making good progress, is tolerating ambulating farther distances. Still requires frequent cues for hip precautions recall and application; internally rotates and adducts during bed mobility, seated in chair. Cognition limits recall of hip precautions, sit to stand hand placement/ kinematics. Will likely need 24/7 close supervision at home due to high fall risk, risk for hip dislocation due to poor hip precaution compliance, and safety awareness.     Strengths: Independent prior level of function, Pleasant and cooperative  Barriers: Constipation, Decreased endurance, Fatigue, Generalized weakness, Impaired activity tolerance, Impaired balance, Impaired carryover of learning, Impaired insight/denial of deficits, Impaired functional cognition, Limited mobility, Pain    Plan    Gait with FWW, balance, strengthening as able, cardiovascular conditioning    Passport items to be completed:  Get in/out of bed safely, in/out of a vehicle, safely use mobility device, walk or wheel around home/community, navigate up and down stairs, show how to get up/down from the ground, ensure home is accessible, demonstrate HEP, complete caregiver  training    Physical Therapy Problems (Active)       Problem: Mobility Transfers       Dates: Start:  06/13/23         Goal: STG-Within one week, patient will perform bed mobility from flat bed with railing with contact guard assist       Dates: Start:  06/13/23    Expected End:  06/30/23            Goal: STG-Within one week, patient will teach back fall recovery with minimal cuing (limited ability to actually perform due to posterior hip precautions)       Dates: Start:  06/13/23    Expected End:  06/30/23               Problem: PT-Long Term Goals       Dates: Start:  06/13/23         Goal: LTG-By discharge, patient will transfer one surface to another with FWW and supervision assist       Dates: Start:  06/13/23    Expected End:  06/30/23            Goal: LTG-By discharge, patient will perform home exercise program from handouts with set up assist       Dates: Start:  06/13/23    Expected End:  06/30/23            Goal: LTG-By discharge, patient will ambulate up/down flight of stairs with bilateral railing and supervision assist       Dates: Start:  06/13/23    Expected End:  06/30/23            Goal: LTG-By discharge, patient will transfer in/out of a car with supervision assist and FWW       Dates: Start:  06/13/23    Expected End:  06/30/23            Goal: LTG-By discharge, patient will teach back hip precautions 100% with no cuing       Dates: Start:  06/13/23    Expected End:  06/30/23

## 2023-06-22 NOTE — CARE PLAN
Problem: Recreation Therapy  Goal: STG-Within one week, patient will demonstrate leisure problem solving by sharing on one new positive leisure activity she would like to participate in following dc.   Outcome: Met  Goal: STG-Within one week, patient will solve a single step leisure activity with Mod verbal cues.   Outcome: Progressing  Goal: LTG-By discharge, patient will demonstrate leisure problem solving by sharing on two new positive leisure activity she would like to participate in following dc and any barriers to their participation.   Outcome: Progressing  Goal: LTG-By discharge, patient will solve a single step leisure activity with Min verbal cues.   Outcome: Progressing

## 2023-06-22 NOTE — CARE PLAN
Problem: Pain - Standard  Goal: Alleviation of pain or a reduction in pain to the patient’s comfort goal  Outcome: Progressing     Problem: Fall Risk - Rehab  Goal: Patient will remain free from falls  Outcome: Progressing     Problem: Mobility  Goal: Patient's capacity to carry out activities will improve  Outcome: Progressing       The patient is Stable - Low risk of patient condition declining or worsening    Shift Goals  Clinical Goals: safety, pain management  Patient Goals: sleep/rest, safety, pain management  Family Goals: none present

## 2023-06-22 NOTE — CARE PLAN
Problem: Balance  Goal: STG-Within one week, patient will improve standing balance to tolerate static standing x 1 min on firm surface with contact guard assist while performing reaching task  Outcome: Not Met     Problem: Mobility  Goal: STG-Within one week, patient will ambulate up/down a 6 inch curb with FWW and min assist  Outcome: Not Met     Problem: Mobility Transfers  Goal: STG-Within one week, patient will perform bed mobility from flat bed with railing with contact guard assist  Outcome: Not Met  Goal: STG-Within one week, patient will teach back fall recovery with minimal cuing (limited ability to actually perform due to posterior hip precautions)  Outcome: Not Met     Problem: Mobility  Goal: STG-Within one week, patient will ambulate 100 ft with FWW and contact guard assist  Outcome: Met

## 2023-06-23 ENCOUNTER — APPOINTMENT (OUTPATIENT)
Dept: OCCUPATIONAL THERAPY | Facility: REHABILITATION | Age: 88
DRG: 560 | End: 2023-06-23
Attending: PHYSICAL MEDICINE & REHABILITATION
Payer: MEDICARE

## 2023-06-23 ENCOUNTER — APPOINTMENT (OUTPATIENT)
Dept: SPEECH THERAPY | Facility: REHABILITATION | Age: 88
DRG: 560 | End: 2023-06-23
Attending: PHYSICAL MEDICINE & REHABILITATION
Payer: MEDICARE

## 2023-06-23 ENCOUNTER — APPOINTMENT (OUTPATIENT)
Dept: PHYSICAL THERAPY | Facility: REHABILITATION | Age: 88
DRG: 560 | End: 2023-06-23
Attending: PHYSICAL MEDICINE & REHABILITATION
Payer: MEDICARE

## 2023-06-23 PROCEDURE — 700111 HCHG RX REV CODE 636 W/ 250 OVERRIDE (IP): Performed by: PHYSICAL MEDICINE & REHABILITATION

## 2023-06-23 PROCEDURE — 94760 N-INVAS EAR/PLS OXIMETRY 1: CPT

## 2023-06-23 PROCEDURE — 700102 HCHG RX REV CODE 250 W/ 637 OVERRIDE(OP): Performed by: PHYSICAL MEDICINE & REHABILITATION

## 2023-06-23 PROCEDURE — 97110 THERAPEUTIC EXERCISES: CPT

## 2023-06-23 PROCEDURE — 97129 THER IVNTJ 1ST 15 MIN: CPT

## 2023-06-23 PROCEDURE — 97535 SELF CARE MNGMENT TRAINING: CPT

## 2023-06-23 PROCEDURE — 99232 SBSQ HOSP IP/OBS MODERATE 35: CPT | Performed by: PHYSICAL MEDICINE & REHABILITATION

## 2023-06-23 PROCEDURE — A9270 NON-COVERED ITEM OR SERVICE: HCPCS | Performed by: PHYSICAL MEDICINE & REHABILITATION

## 2023-06-23 PROCEDURE — 97130 THER IVNTJ EA ADDL 15 MIN: CPT

## 2023-06-23 PROCEDURE — 770010 HCHG ROOM/CARE - REHAB SEMI PRIVAT*

## 2023-06-23 PROCEDURE — 97116 GAIT TRAINING THERAPY: CPT

## 2023-06-23 PROCEDURE — 97530 THERAPEUTIC ACTIVITIES: CPT

## 2023-06-23 RX ADMIN — OMEPRAZOLE 20 MG: 20 CAPSULE, DELAYED RELEASE ORAL at 08:13

## 2023-06-23 RX ADMIN — OXYCODONE HYDROCHLORIDE 10 MG: 10 TABLET ORAL at 12:28

## 2023-06-23 RX ADMIN — LISINOPRIL 5 MG: 5 TABLET ORAL at 08:12

## 2023-06-23 RX ADMIN — OXYCODONE HYDROCHLORIDE 10 MG: 10 TABLET ORAL at 21:47

## 2023-06-23 RX ADMIN — DONEPEZIL HYDROCHLORIDE 5 MG: 5 TABLET, FILM COATED ORAL at 21:47

## 2023-06-23 RX ADMIN — OXYCODONE HYDROCHLORIDE 10 MG: 10 TABLET ORAL at 16:20

## 2023-06-23 RX ADMIN — SENNOSIDES AND DOCUSATE SODIUM 2 TABLET: 50; 8.6 TABLET ORAL at 21:47

## 2023-06-23 RX ADMIN — ENOXAPARIN SODIUM 40 MG: 100 INJECTION SUBCUTANEOUS at 17:57

## 2023-06-23 ASSESSMENT — ACTIVITIES OF DAILY LIVING (ADL)
BED_CHAIR_WHEELCHAIR_TRANSFER_DESCRIPTION: INCREASED TIME;SET-UP OF EQUIPMENT;SUPERVISION FOR SAFETY;VERBAL CUEING
BED_CHAIR_WHEELCHAIR_TRANSFER_DESCRIPTION: INCREASED TIME;INITIAL PREPARATION FOR TASK;SUPERVISION FOR SAFETY;VERBAL CUEING
TOILET_TRANSFER_DESCRIPTION: ADAPTIVE EQUIPMENT;GRAB BAR;INCREASED TIME;SUPERVISION FOR SAFETY;VERBAL CUEING

## 2023-06-23 ASSESSMENT — GAIT ASSESSMENTS
DISTANCE (FEET): 5
ASSISTIVE DEVICE: FRONT WHEEL WALKER
GAIT LEVEL OF ASSIST: CONTACT GUARD ASSIST

## 2023-06-23 NOTE — CARE PLAN
Problem: Fall Risk - Rehab  Goal: Patient will remain free from falls  Outcome: Progressing   The patient is Stable - Low risk of patient condition declining or worsening    Shift Goals  Clinical Goals: safety, pain management  Patient Goals: sleep/rest, safety, pain management  Family Goals: none present    Problem: Pain - Standard  Goal: Alleviation of pain or a reduction in pain to the patient’s comfort goal  Outcome: Progressing

## 2023-06-23 NOTE — CARE PLAN
Problem: Fall Risk - Rehab  Goal: Patient will remain free from falls  Outcome: Progressing   The patient is Watcher - Medium risk of patient condition declining or worsening    Shift Goals  Clinical Goals: safety, pain management  Patient Goals: sleep/rest, safety, pain management  Family Goals: none present

## 2023-06-23 NOTE — THERAPY
"Physical Therapy   Daily Treatment     Patient Name: Chava Bray  Age:  87 y.o., Sex:  female  Medical Record #: [id number]  Today's Date: 6/23/2023     Precautions  Precautions: Fall Risk, Posterior Hip Precautions, Weight Bearing As Tolerated Left Lower Extremity  Comments: Hx Dementia, impaired safety    Subjective    Patient is found seated up in chair with friend visiting, patient requests to use the bathroom.      Objective       06/23/23 1415   PT Charge Group   PT Gait Training (Units) 2   PT Therapeutic Exercise (Units) 1   PT Therapeutic Activities (Units) 1   PT Total Time Spent   PT Individual Total Time Spent (Mins) 60   Gait Functional Level of Assist    Gait Level Of Assist Contact Guard Assist   Assistive Device Front Wheel Walker   Distance (Feet) 5  (to and from stair case)   # of Times Distance was Traveled 2   Deviation Antalgic;Step To;Decreased Base Of Support;Bradykinetic;Decreased Toe Off;Decreased Heel Strike   Stairs Functional Level of Assist   Level of Assist with Stairs Minimal Assist   # of Stairs Climbed 6  (one set of 6 stairs, requires max cuing for sequencing \"up with the good, down with the bad\", does not follow instructions)   Stairs Description Extra time;Hand rails;Safety concerns;Supervision for safety;Verbal cueing   Transfer Functional Level of Assist   Bed, Chair, Wheelchair Transfer Minimal Assist   Bed Chair Wheelchair Transfer Description Increased time;Initial preparation for task;Supervision for safety;Verbal cueing   Toilet Transfers Moderate Assist   Toilet Transfer Description Adaptive equipment;Grab bar;Supervision for safety;Verbal cueing;Increased time;Initial preparation for task   Interdisciplinary Plan of Care Collaboration   IDT Collaboration with  Family / Caregiver;Physician   Patient Position at End of Therapy In Bed;Call Light within Reach;Tray Table within Reach;Phone within Reach;Family / Friend in Room;Self Releasing Lap Belt Applied;Chair Alarm " "On   Collaboration Comments MD aware patient may have another UTI         Assessment    Patient has significantly more difficult time today following instructions, is much more easily distractible, is confabulatory. Has urinary incontinence and increased frequency of urination with foul smelling urine, may have another UTI. MD is aware. She is not functioning as well as she did yesterday, requires max assist to sequence sitting down on toilet as she attempts to sit down on the wheelchair, is unable to sequence 90 degree turn to position body over toilet. Cuing for 3/3 hip precautions, was unable to recall without cues \"how much are you able to bend?, are you allowed to cross your legs\".     Strengths: Independent prior level of function, Pleasant and cooperative  Barriers: Constipation, Decreased endurance, Fatigue, Generalized weakness, Impaired activity tolerance, Impaired balance, Impaired carryover of learning, Impaired insight/denial of deficits, Impaired functional cognition, Limited mobility, Pain    Plan    Gait, stairs, hip precautions, safety awareness, balance, strengthening/ conditioning as able.    Passport items to be completed:  Get in/out of bed safely, in/out of a vehicle, safely use mobility device, walk or wheel around home/community, navigate up and down stairs, show how to get up/down from the ground, ensure home is accessible, demonstrate HEP, complete caregiver training    Physical Therapy Problems (Active)       Problem: Mobility Transfers       Dates: Start:  06/13/23         Goal: STG-Within one week, patient will perform bed mobility from flat bed with railing with contact guard assist       Dates: Start:  06/13/23    Expected End:  06/30/23            Goal: STG-Within one week, patient will teach back fall recovery with minimal cuing (limited ability to actually perform due to posterior hip precautions)       Dates: Start:  06/13/23    Expected End:  06/30/23               Problem: " PT-Long Term Goals       Dates: Start:  06/13/23         Goal: LTG-By discharge, patient will transfer one surface to another with FWW and supervision assist       Dates: Start:  06/13/23    Expected End:  06/30/23            Goal: LTG-By discharge, patient will perform home exercise program from handouts with set up assist       Dates: Start:  06/13/23    Expected End:  06/30/23            Goal: LTG-By discharge, patient will ambulate up/down flight of stairs with bilateral railing and supervision assist       Dates: Start:  06/13/23    Expected End:  06/30/23            Goal: LTG-By discharge, patient will transfer in/out of a car with supervision assist and FWW       Dates: Start:  06/13/23    Expected End:  06/30/23            Goal: LTG-By discharge, patient will teach back hip precautions 100% with no cuing       Dates: Start:  06/13/23    Expected End:  06/30/23

## 2023-06-23 NOTE — FLOWSHEET NOTE
06/23/23 0836   Events/Summary/Plan   Events/Summary/Plan RA pulse ox check. Sitting up in wheelchair   Vital Signs   Pulse 60   Respiration 18   Pulse Oximetry 93 %   $ Pulse Oximetry (Spot Check) Yes   Respiratory Assessment   Level of Consciousness Alert   Chest Exam   Work Of Breathing / Effort Within Normal Limits   Oxygen   O2 Delivery Device Room air w/o2 available

## 2023-06-23 NOTE — THERAPY
Speech Language Pathology  Daily Treatment     Patient Name: Chava Bray  Age:  87 y.o., Sex:  female  Medical Record #: [id number]  Today's Date: 6/23/2023     Precautions  Precautions: Fall Risk, Posterior Hip Precautions, Weight Bearing As Tolerated Left Lower Extremity  Comments: Hx Dementia, impaired safety    Subjective    Pt pleasantly confused during tx.  Pt perseverative regarding need to use restroom, currently staying at a hotel ([employer]), and working at [employer].       Objective       06/23/23 1331   Treatment Charges   SLP Cognitive Skill Development First 15 Minutes 1   SLP Cognitive Skill Development Additional 15 Minutes 1   SLP Total Time Spent   SLP Individual Total Time Spent (Mins) 30   Cognition   Attention to Task Moderate (3)   Moderate Attention Severe (2)   Visual Scanning / Cancellation Skills Severe (2)         Assessment    Pt required mod cues to attend to tasks.  Pt required to chose the opposite when given a target word (choice of 3): 0% independent; 100% given mod-max verbal cues.  Single target cancellation: 2/18.  Pt reported double vision.  Pt benefits from mod verbal cues for redirection.  Pt required mod-max verbal cues to recall use of call light and WC brakes.      Strengths: Supportive family  Barriers: Decreased endurance, Impaired functional cognition, Impaired insight/denial of deficits, Impaired carryover of learning, Uncooperative    Plan    Basic attn, problem solving, visual scanning.         Speech Therapy Problems (Active)       Problem: Problem Solving STGs       Dates: Start:  06/16/23         Goal: STG-Within one week, patient will complete SCCAN with additional goals as appropriate.        Dates: Start:  06/16/23    Expected End:  06/30/23            Goal: STG-Within one week, patient will answer basic problem solving questions with 70% accuracy given mod verbal cues.         Dates: Start:  06/16/23    Expected End:  06/30/23               Problem:  Speech/Swallowing LTGs       Dates: Start:  06/16/23         Goal: LTG-By discharge, patient will solve basic problems Miki for safe discharge       Dates: Start:  06/16/23    Expected End:  06/30/23

## 2023-06-23 NOTE — THERAPY
Occupational Therapy  Daily Treatment     Patient Name: Chava Bray  Age:  87 y.o., Sex:  female  Medical Record #: [id number]  Today's Date: 6/23/2023     Precautions  Precautions: (P) Fall Risk, Posterior Hip Precautions, Weight Bearing As Tolerated Left Lower Extremity  Comments: (P) Hx Dementia, impaired safety         Subjective    Pt reports poor night sleep and fatigue; requested to get into bed throughout session.      Objective       06/23/23 0931   OT Charge Group   OT Self Care / ADL (Units) 1   OT Therapeutic Exercise (Units) 3   OT Total Time Spent   OT Individual Total Time Spent (Mins) 60   Precautions   Precautions Fall Risk;Posterior Hip Precautions;Weight Bearing As Tolerated Left Lower Extremity   Comments Hx Dementia, impaired safety   Cognition    Level of Consciousness Alert   Functional Level of Assist   Grooming Supervision;Seated   Toileting Contact Guard Assist   Toileting Description Grab bar;Increased time;Supervision for safety;Assist for standing balance   Bed, Chair, Wheelchair Transfer Minimal Assist   Bed Chair Wheelchair Transfer Description Increased time;Set-up of equipment;Supervision for safety;Verbal cueing  (Cues for post hip prec)   Toilet Transfers Contact Guard Assist   Toilet Transfer Description Adaptive equipment;Grab bar;Increased time;Supervision for safety;Verbal cueing  (FWW)   Sitting Upper Body Exercises   Chest Press 3 sets of 10;Bilateral;Weight (See Comments for lbs)   Front Arm Raise 3 sets of 10;Bilateral;Weight (See Comments for lbs)   Shoulder Press 3 sets of 10;Bilateral;Weight (See Comments for lbs)   Internal Shoulder Rotation 3 sets of 10;Bilateral;Weight (See Comments for lbs)   External Shoulder Rotation 3 sets of 10;Bilateral;Weight (See Comments for lbs)   Bicep Curls 3 sets of 10;Bilateral;Weight (See Comments for lbs)   Pronation / Supination 3 sets of 10;Bilateral;Weight (See Comments for lbs)   Upper Extremity Bike Level 3 Resistance  (BUE  MotoMed 5 min x2)   Comments 1-2# weights used for UB exercises   Bed Mobility    Supine to Sit Minimal Assist   Sit to Supine Contact Guard Assist   Scooting Standby Assist   Rolling Standby Assist   Interdisciplinary Plan of Care Collaboration   IDT Collaboration with  Certified Nursing Assistant   Patient Position at End of Therapy In Bed;Bed Alarm On;Call Light within Reach;Tray Table within Reach;Phone within Reach   Collaboration Comments BM, room change         Assessment    Pt with increased fatigue today, and required encouragement to participate. She con't to require CGA-SBA for safety w/ ADLs and cues for maintaining posterior hip prec during bed mobility.   Strengths: Motivated for self care and independence, Pleasant and cooperative, Supportive family, Willingly participates in therapeutic activities  Barriers: Decreased endurance, Fatigue, Generalized weakness, Home accessibility, Impaired activity tolerance, Impaired balance, Impaired carryover of learning, Impaired insight/denial of deficits, Impaired functional cognition, Limited mobility, Pain, Pain poorly managed    Plan    Dc [facility] [place] on Sunday 6/25 (d/c with HHOT, 24/7 supv, spouse ordering tub txfr bench and putting in GB).   ADLs w/ AE education as able, functional mobility/transfers, strength/endurance, balance, functional cog.     Occupational Therapy Goals (Active)       Problem: Functional Transfers       Dates: Start:  06/22/23         Goal: STG-Within one week, patient will transfer to tub/shower with SBA using AD/DME prn.        Dates: Start:  06/22/23               Problem: OT Long Term Goals       Dates: Start:  06/13/23         Goal: LTG-By discharge, patient will complete basic self care tasks at supv to mod I level using AE as needed to maintain hip prec.        Dates: Start:  06/13/23    Expected End:  06/30/23            Goal: LTG-By discharge, patient will perform bathroom transfers at supv to mod I level using AD/DME as needed.         Dates: Start:  06/13/23    Expected End:  06/30/23               Problem: Toileting       Dates: Start:  06/22/23         Goal: STG-Within one week, patient will complete toileting tasks with SBA using AE prn.        Dates: Start:  06/22/23

## 2023-06-23 NOTE — THERAPY
Occupational Therapy  Daily Treatment     Patient Name: Chava Bray  Age:  87 y.o., Sex:  female  Medical Record #: [id number]  Today's Date: 6/23/2023     Precautions  Precautions: Fall Risk, Posterior Hip Precautions, Weight Bearing As Tolerated Left Lower Extremity  Comments: Hx Dementia, impaired safety         Subjective    Patient was seated in w/c and eating breakfast.  Agreeable to OT.     Objective       06/23/23 0831   OT Charge Group   OT Self Care / ADL (Units) 2   OT Total Time Spent   OT Individual Total Time Spent (Mins) 60   Precautions   Precautions Fall Risk;Posterior Hip Precautions;Weight Bearing As Tolerated Left Lower Extremity   Cognition    New Learning Impaired   Attention Impaired   Functional Level of Assist   Eating Independent   Eating Description Increased time   Grooming Supervision;Seated   Grooming Description Supervision for safety;Verbal cueing;Seated in wheelchair at sink   IADL Treatments   IADL Treatments Kitchen mobility education   Kitchen Mobility Education Patient educated on safe kitchen mobility techniques using FWW and counter for support.  She mobilized around kitchen with FWW and CGA to retrieve two items from upper cabinet and microwave.  She required extra time and verbal cues.   Interdisciplinary Plan of Care Collaboration   Patient Position at End of Therapy Seated;Call Light within Reach;Tray Table within Reach;Phone within Reach;Self Releasing Lap Belt Applied;Chair Alarm On         Assessment    Patient required extra time and cues for kitchen mobility as patient's attention to task is impaired.    Strengths: Motivated for self care and independence, Pleasant and cooperative, Supportive family, Willingly participates in therapeutic activities  Barriers: Decreased endurance, Fatigue, Generalized weakness, Home accessibility, Impaired activity tolerance, Impaired balance, Impaired carryover of learning, Impaired insight/denial of deficits, Impaired functional  cognition, Limited mobility, Pain, Pain poorly managed    Plan    Dc [facility] [place] on Sunday 6/25 (d/c with HHOT, 24/7 supv, spouse ordering tub txfr bench and putting in GB).   ADLs w/ AE education as able, functional mobility/transfers, strength/endurance, balance, functional cog.     Occupational Therapy Goals (Active)       Problem: Functional Transfers       Dates: Start:  06/22/23         Goal: STG-Within one week, patient will transfer to tub/shower with SBA using AD/DME prn.        Dates: Start:  06/22/23               Problem: OT Long Term Goals       Dates: Start:  06/13/23         Goal: LTG-By discharge, patient will complete basic self care tasks at supv to mod I level using AE as needed to maintain hip prec.        Dates: Start:  06/13/23    Expected End:  06/30/23            Goal: LTG-By discharge, patient will perform bathroom transfers at supv to mod I level using AD/DME as needed.        Dates: Start:  06/13/23    Expected End:  06/30/23               Problem: Toileting       Dates: Start:  06/22/23         Goal: STG-Within one week, patient will complete toileting tasks with SBA using AE prn.        Dates: Start:  06/22/23

## 2023-06-23 NOTE — PROGRESS NOTES
"  Physical Medicine & Rehabilitation Progress Note    Encounter Date: 6/23/2023    Chief Complaint: Decreased mobility, constipation    Interval Events (Subjective):  Patient sitting up in room. She reports therapy is going well. She does not like that she moved rooms. Denies NVD.     _____________________________________  Interdisciplinary Team Conference   Most recent IDT on 6/22/2023    Discharge Date/Disposition:  6/26/23  _____________________________________      Objective:  VITAL SIGNS: /80   Pulse 60   Temp 37.1 °C (98.8 °F) (Oral)   Resp 18   Ht 1.6 m (5' 3\")   Wt 52.2 kg (115 lb)   SpO2 93%   BMI 20.37 kg/m²   Gen: NAD  Psych: Mood and affect appropriate  CV: RRR, 0 edema  Resp: CTAB, no upper airway sounds  Abd: NTND  Neuro: AOx3, following commands    Laboratory Values:  No results found for this or any previous visit (from the past 72 hour(s)).      Medications:  Scheduled Medications   Medication Dose Frequency    donepezil  5 mg Q EVENING    lisinopril  5 mg DAILY    senna-docusate  2 Tablet BID    And    polyethylene glycol/lytes  1 Packet DAILY    Pharmacy Consult Request  1 Each PHARMACY TO DOSE    omeprazole  20 mg DAILY    enoxaparin (LOVENOX) injection  40 mg DAILY AT 1800     PRN medications: senna-docusate **AND** polyethylene glycol/lytes **AND** magnesium hydroxide **AND** bisacodyl, Respiratory Therapy Consult, hydrALAZINE, acetaminophen, mag hydrox-al hydrox-simeth, ondansetron **OR** ondansetron, traZODone, sodium chloride, oxyCODONE immediate-release **OR** oxyCODONE immediate-release    Diet:  Current Diet Order   Procedures    Diet Order Diet: Regular       Medical Decision Making and Plan:  Left hip fracture - Patient with mechanical fall at home with left hip fracture s/p hemiarthroplasty on 6/9/23 with Dr. Becker. Patient is WBAT, posterior precautions.   -PT and OT for mobility and ADLs. Per guidelines, 15 hours per week between PT, OT and/or SLP.  -Follow-up Dr." Eugenio     HTN - Previously on Lisinopril but low SBP after fall. Will monitor. Into 110s, will monitor. Occasiona low 140s, will start 5 mg Lisinopril. Continue Lisinopril      Azotemia - Worsening after surgery. Check AM CMP - improved to 15     Hypothyroidism - Patient was on Levothyroxine in the past. Currently not on it. Will check TSH - wnl      Neuropathic pain - Patient with history of neuropathic pain from spinal stenosis. Previously on Gabapentin     Dysuria - Developed dysuria, UA + for UTI. Start Omnicef and send for cultures. PharmD recommending change to Macrobid. Ucx with E coli, completed macrobid. Resolved    Pain - Patient on Tylenol scheduled and PRN oxycodone. Discontinue Tylenol     Dementia - Does not appear to be on any medications. Will consult SLP. Start Aricept 5 mg QHS. Continue Aricept    Skin - Patient at risk for skin breakdown due to debility in areas including sacrum, achilles, elbows and head in addition to other sites. Nursing to assess skin daily.      GI Ppx - Patient on Prilosec for GERD prophylaxis. Patient on Senna-docusate for constipation prophylaxis.   -Severe constipation, mild distention, give Miralax. KUB on 6/13/23 - mild constipation in distal colon. Continue PPI and Miralax    DVT Ppx - Patient Lovenox on transfer.  ____________________________________    T. French John MD/PhD  [facility] - Physical Medicine & Rehabilitation   [facility] - Brain Injury Medicine   ____________________________________

## 2023-06-24 ENCOUNTER — APPOINTMENT (OUTPATIENT)
Dept: SPEECH THERAPY | Facility: REHABILITATION | Age: 88
End: 2023-06-24
Attending: PHYSICAL MEDICINE & REHABILITATION
Payer: MEDICARE

## 2023-06-24 PROBLEM — I49.3 PVCS (PREMATURE VENTRICULAR CONTRACTIONS): Status: ACTIVE | Noted: 2023-06-24

## 2023-06-24 PROBLEM — I10 HTN (HYPERTENSION): Status: ACTIVE | Noted: 2023-06-24

## 2023-06-24 LAB — EKG IMPRESSION: NORMAL

## 2023-06-24 PROCEDURE — A9270 NON-COVERED ITEM OR SERVICE: HCPCS | Performed by: PHYSICAL MEDICINE & REHABILITATION

## 2023-06-24 PROCEDURE — 770010 HCHG ROOM/CARE - REHAB SEMI PRIVAT*

## 2023-06-24 PROCEDURE — 700102 HCHG RX REV CODE 250 W/ 637 OVERRIDE(OP): Performed by: PHYSICAL MEDICINE & REHABILITATION

## 2023-06-24 PROCEDURE — 93005 ELECTROCARDIOGRAM TRACING: CPT | Performed by: PHYSICAL MEDICINE & REHABILITATION

## 2023-06-24 PROCEDURE — 99222 1ST HOSP IP/OBS MODERATE 55: CPT | Performed by: HOSPITALIST

## 2023-06-24 PROCEDURE — 97130 THER IVNTJ EA ADDL 15 MIN: CPT

## 2023-06-24 PROCEDURE — 97129 THER IVNTJ 1ST 15 MIN: CPT

## 2023-06-24 PROCEDURE — 700111 HCHG RX REV CODE 636 W/ 250 OVERRIDE (IP): Performed by: PHYSICAL MEDICINE & REHABILITATION

## 2023-06-24 RX ADMIN — TRAZODONE HYDROCHLORIDE 50 MG: 50 TABLET ORAL at 21:34

## 2023-06-24 RX ADMIN — POLYETHYLENE GLYCOL 3350 1 PACKET: 17 POWDER, FOR SOLUTION ORAL at 08:50

## 2023-06-24 RX ADMIN — ACETAMINOPHEN 650 MG: 325 TABLET ORAL at 10:43

## 2023-06-24 RX ADMIN — ENOXAPARIN SODIUM 40 MG: 100 INJECTION SUBCUTANEOUS at 17:11

## 2023-06-24 RX ADMIN — OXYCODONE 5 MG: 5 TABLET ORAL at 15:13

## 2023-06-24 RX ADMIN — OMEPRAZOLE 20 MG: 20 CAPSULE, DELAYED RELEASE ORAL at 08:50

## 2023-06-24 RX ADMIN — SENNOSIDES AND DOCUSATE SODIUM 2 TABLET: 50; 8.6 TABLET ORAL at 08:50

## 2023-06-24 RX ADMIN — DONEPEZIL HYDROCHLORIDE 5 MG: 5 TABLET, FILM COATED ORAL at 21:34

## 2023-06-24 ASSESSMENT — ENCOUNTER SYMPTOMS
HEMOPTYSIS: 0
SHORTNESS OF BREATH: 0
PALPITATIONS: 0
ABDOMINAL PAIN: 0
BRUISES/BLEEDS EASILY: 0
VOMITING: 0
FEVER: 0
NAUSEA: 0
POLYDIPSIA: 0
EYES NEGATIVE: 1
FOCAL WEAKNESS: 1
CHILLS: 0

## 2023-06-24 NOTE — PROGRESS NOTES
"  Physical Medicine & Rehabilitation Progress Note    Encounter Date: 6/24/2023    Chief Complaint: Decreased mobility, constipation, bradycardia    Interval Events (Subjective):  Patient sitting up in [facility] outside of room. She reports therapy is fine.  Reports that sleep was okay.  No new complaints.    _____________________________________  Interdisciplinary Team Conference   Most recent IDT on 6/22/2023    Discharge Date/Disposition:  6/26/23  _____________________________________      Objective:  VITAL SIGNS: BP (!) 144/50   Pulse (!) 42 Comment: AP  Temp 36.5 °C (97.7 °F) (Oral)   Resp 18   Ht 1.6 m (5' 3\")   Wt 52.2 kg (115 lb)   SpO2 93%   BMI 20.37 kg/m²   Gen: NAD  Psych: Mood and affect appropriate  CV: RRR, no edema  Resp: CTAB, no upper airway sounds  Abd: soft, non-tender, non-distended  Neuro:Alert and cooperative, follows commands    Laboratory Values:  No results found for this or any previous visit (from the past 72 hour(s)).      Medications:  Scheduled Medications   Medication Dose Frequency    donepezil  5 mg Q EVENING    lisinopril  5 mg DAILY    senna-docusate  2 Tablet BID    And    polyethylene glycol/lytes  1 Packet DAILY    Pharmacy Consult Request  1 Each PHARMACY TO DOSE    omeprazole  20 mg DAILY    enoxaparin (LOVENOX) injection  40 mg DAILY AT 1800     PRN medications: senna-docusate **AND** polyethylene glycol/lytes **AND** magnesium hydroxide **AND** bisacodyl, Respiratory Therapy Consult, hydrALAZINE, acetaminophen, mag hydrox-al hydrox-simeth, ondansetron **OR** ondansetron, traZODone, sodium chloride, oxyCODONE immediate-release **OR** oxyCODONE immediate-release    Diet:  Current Diet Order   Procedures    Diet Order Diet: Regular       Medical Decision Making and Plan:  Left hip fracture - Patient with mechanical fall at home with left hip fracture s/p hemiarthroplasty on 6/9/23 with Dr. Becker. Patient is WBAT, posterior precautions.   -PT and OT for mobility and ADLs. " Per guidelines, 15 hours per week between PT, OT and/or SLP.  -Follow-up Dr. Becker     HTN - Previously on Lisinopril but low SBP after fall. Will monitor. Into 110s, will monitor. Occasiona low 140s, will start 5 mg Lisinopril. Continue Lisinopril.     Azotemia - Worsening after surgery. Check AM CMP - improved to 15    Bradycardia- EKG obtained.  Consulted Hospitalist.  Discussed case.  Appreciate input.  Dr. Pihllips did not see acute changes from previous EKG.       Hypothyroidism - Patient was on Levothyroxine in the past. Currently not on it. Will check TSH - wnl      Neuropathic pain - Patient with history of neuropathic pain from spinal stenosis. Previously on Gabapentin.       Dysuria - Developed dysuria, UA + for UTI. Start Omnicef and send for cultures. PharmD recommending change to Macrobid. Ucx with E coli, completed macrobid. Resolved    Pain - Patient on Tylenol scheduled and PRN oxycodone. Discontinue Tylenol     Dementia - Does not appear to be on any medications. Will consult SLP. Start Aricept 5 mg QHS. Continue Aricept    Skin - Patient at risk for skin breakdown due to debility in areas including sacrum, achilles, elbows and head in addition to other sites. Nursing to assess skin daily.      GI Ppx - Patient on Prilosec for GERD prophylaxis. Patient on Senna-docusate for constipation prophylaxis.   -Severe constipation, mild distention, give Miralax. KUB on 6/13/23 - mild constipation in distal colon. Continue PPI and Miralax    DVT Ppx - Patient Lovenox on transfer.  ____________________________________    Harrison Menard MD  [facility] - Physical Medicine & Rehabilitation   [facility] - Pain Medicine   ____________________________________

## 2023-06-24 NOTE — CONSULTS
HOSPITAL MEDICINE CONSULTATION    Requesting Physician:  Dr. Menard    Reason for Consult:  Bradycardia, Hypertension    History of Present Illness:  The patient is an 87-year-old  female with past medical history significant for mild cognitive impairment and hypertension.  She was admitted to [facility] on 6/8/23 after sustaining a mechanical ground level fall with left hip pain several days prior.  There was no head trauma or loss of consciousness.  Imaging revealed displaced left femoral neck fracture, and she underwent left hip hemiarthroplasty on 6/9/23 with Dr. Becker.  Due to her ongoing functional debility, the patient was transferred to [facility] on 6/12/23.  Hospital Medicine consultation is requested on 6/24/23 to assist in the management of this patient's apparent bradycardia with reported heart rates in the 30's and 40's.  She denies dizziness, chest pain, shortness of breath, palpitations, abdominal pain, nausea, vomiting, or focal neurological deficits.    Review of Systems:  Review of Systems   Constitutional:  Negative for chills and fever.   HENT: Negative.     Eyes: Negative.    Respiratory:  Negative for hemoptysis and shortness of breath.    Cardiovascular:  Negative for chest pain and palpitations.   Gastrointestinal:  Negative for abdominal pain, nausea and vomiting.   Genitourinary: Negative.    Musculoskeletal:  Positive for joint pain.   Skin:  Negative for itching and rash.   Neurological:  Positive for focal weakness.   Endo/Heme/Allergies:  Negative for polydipsia. Does not bruise/bleed easily.   All other systems reviewed and are negative.      Allergies:  Allergies   Allergen Reactions    Sulfa Drugs Unspecified     Unknown childhood reaction        Medications:    Current Facility-Administered Medications:     donepezil (ARICEPT) tablet 5 mg, 5 mg, Oral, Q EVENING, Doreen John M.D., 5 mg at 06/23/23 0893    lisinopril  (PRINIVIL) tablet 5 mg, 5 mg, Oral, DAILY, Doreen John M.D., 5 mg at 06/23/23 0812    senna-docusate (PERICOLACE or SENOKOT S) 8.6-50 MG per tablet 2 Tablet, 2 Tablet, Oral, BID, 2 Tablet at 06/24/23 0850 **AND** polyethylene glycol/lytes (MIRALAX) PACKET 1 Packet, 1 Packet, Oral, DAILY, 1 Packet at 06/24/23 0850 **AND** magnesium hydroxide (MILK OF MAGNESIA) suspension 30 mL, 30 mL, Oral, QDAY PRN, 30 mL at 06/14/23 1739 **AND** bisacodyl (DULCOLAX) suppository 10 mg, 10 mg, Rectal, QDAY PRN, Doreen John M.D.    Respiratory Therapy Consult, , Nebulization, Continuous RT, Doreen John M.D.    Pharmacy Consult Request ...Pain Management Review 1 Each, 1 Each, Other, PHARMACY TO DOSE, Doreen John M.D.    hydrALAZINE (APRESOLINE) tablet 25 mg, 25 mg, Oral, Q8HRS PRN, Doreen John M.D.    acetaminophen (Tylenol) tablet 650 mg, 650 mg, Oral, Q4HRS PRN, Doreen John M.D., 650 mg at 06/15/23 1115    omeprazole (PRILOSEC) capsule 20 mg, 20 mg, Oral, DAILY, Doreen John M.D., 20 mg at 06/24/23 0850    mag hydrox-al hydrox-simeth (MAALOX PLUS ES or MYLANTA DS) suspension 20 mL, 20 mL, Oral, Q2HRS PRN, Doreen John M.D.    ondansetron (ZOFRAN ODT) dispertab 4 mg, 4 mg, Oral, 4X/DAY PRN **OR** ondansetron (ZOFRAN) syringe/vial injection 4 mg, 4 mg, Intramuscular, 4X/DAY PRN, Doreen John M.D.    traZODone (DESYREL) tablet 50 mg, 50 mg, Oral, QHS PRN, Doreen John M.D., 50 mg at 06/22/23 2007    sodium chloride (OCEAN) 0.65 % nasal spray 2 Spray, 2 Spray, Nasal, PRN, Doreen John M.D.    oxyCODONE immediate-release (ROXICODONE) tablet 5 mg, 5 mg, Oral, Q3HRS PRN, 5 mg at 06/21/23 0841 **OR** oxyCODONE immediate release (ROXICODONE) tablet 10 mg, 10 mg, Oral, Q3HRS PRN, Doreen John M.D., 10 mg at 06/23/23 2141    enoxaparin (Lovenox) inj 40 mg, 40 mg, Subcutaneous, DAILY AT 1800,  Doreen John M.D., 40 mg at 06/23/23 1757    Past Medical/Surgical History:  Past Medical History:   Diagnosis Date    Arthritis     Headache(784.0)     Headache, classical migraine     Hyperlipidemia     Osteoporosis, unspecified     Thyroid disease      Past Surgical History:   Procedure Laterality Date    PB PARTIAL HIP REPLACEMENT Left 6/9/2023    Procedure: HEMIARTHROPLASTY, HIP;  Surgeon: Sathish Becker M.D.;  Location: SURGERY [facility];  Service: Orthopedics       Social History:  Social History     Socioeconomic History    Marital status:      Spouse name: Not on file    Number of children: Not on file    Years of education: Not on file    Highest education level: Not on file   Occupational History    Not on file   Tobacco Use    Smoking status: Never    Smokeless tobacco: Never   Substance and Sexual Activity    Alcohol use: Yes    Drug use: No    Sexual activity: Not on file   Other Topics Concern    Not on file   Social History Narrative    Not on file     Social Determinants of Health     Financial Resource Strain: Not on file   Food Insecurity: Not on file   Transportation Needs: No Transportation Needs (6/13/2023)    PRAPARE - Transportation     Lack of Transportation (Medical): No     Lack of Transportation (Non-Medical): No   Physical Activity: Not on file   Stress: Not on file   Social Connections: Not on file   Intimate Partner Violence: Not on file   Housing Stability: Not on file       Family History:  Family History   Problem Relation Age of Onset    Other Neg Hx        Physical Examination:   Vitals:    06/23/23 1910 06/24/23 0710 06/24/23 0855 06/24/23 0900   BP:  90/56 (!) 73/48 (!) 144/50   Pulse: 61 (!) 48 (!) 40 (!) 42   Resp:  18     Temp:  36.5 °C (97.7 °F)     TempSrc:  Oral     SpO2:  91% 88% 93%   Weight:       Height:           Physical Exam  Vitals reviewed.   Constitutional:       General: She is not in acute distress.     Appearance: Normal appearance. She  is not ill-appearing.   HENT:      Head: Normocephalic and atraumatic.      Right Ear: Tympanic membrane normal.      Left Ear: Tympanic membrane normal.      Nose: Nose normal.      Mouth/Throat:      Mouth: Mucous membranes are moist.   Eyes:      General:         Right eye: No discharge.         Left eye: No discharge.      Extraocular Movements: Extraocular movements intact.      Conjunctiva/sclera: Conjunctivae normal.   Cardiovascular:      Rate and Rhythm: Normal rate. Rhythm irregular.   Pulmonary:      Effort: Pulmonary effort is normal. No respiratory distress.      Breath sounds: Normal breath sounds. No wheezing.   Abdominal:      General: Bowel sounds are normal. There is no distension.      Palpations: Abdomen is soft.      Tenderness: There is no abdominal tenderness.   Musculoskeletal:      Cervical back: Normal range of motion and neck supple.      Right lower leg: No edema.      Left lower leg: Edema present.   Skin:     General: Skin is warm and dry.   Neurological:      Mental Status: She is alert and oriented to person, place, and time.         Laboratory Data:            Impressions/Recommendations:  Left displaced femoral neck fracture (HCC)  2/2 mechanical GLF  S/P L Hip Hemiarthroplasty on 6/9/23 by Dr. Becker   mL  Wound care and pain control per Physiatry    Mild cognitive impairment  On Aricept    HTN (hypertension)  Observe blood pressure trends on Lisinopril    PVCs (premature ventricular contractions)  Pt asymptomatic  Automated VS machine shows HR 40s  EKG HR 90 w/ bigeminy, no change from prior  Check electrolytes    Full Code    Discussed with Staff and Dr. Menard.  Thank you for the opportunity to assist in this patient's care.  We will continue to follow along with you.

## 2023-06-24 NOTE — ASSESSMENT & PLAN NOTE
Pt asymptomatic  Automated VS machine shows HR 40s  EKG HR 90 w/ bigeminy, no change from prior  Electrolytes normal

## 2023-06-24 NOTE — THERAPY
Speech Language Pathology  Daily Treatment     Patient Name: Chava Bray  Age:  87 y.o., Sex:  female  Medical Record #: [id number]  Today's Date: 2023     Precautions  Precautions: Fall Risk, Posterior Hip Precautions, Weight Bearing As Tolerated Left Lower Extremity  Comments: Hx Dementia, impaired safety    Subjective    Pt sleeping in bed at scheduled therapy time.  Pt agreeable to therapy in speech office.      Objective       23 1001   Treatment Charges   SLP Cognitive Skill Development First 15 Minutes 1   SLP Cognitive Skill Development Additional 15 Minutes 1   SLP Total Time Spent   SLP Individual Total Time Spent (Mins) 30   Cognition   Attention to Task Minimal (4)   Orientation  Moderate (3)   Visual Scanning / Cancellation Skills Minimal (4)         Assessment    O-lo/30.  Single target cancellation: 89% independent; 100% given min verbal cues.  Pt benefits from min verbal cues to attend to task.      Strengths: Supportive family  Barriers: Decreased endurance, Impaired functional cognition, Impaired insight/denial of deficits, Impaired carryover of learning, Uncooperative    Plan    Target attn, recall, orientation.       Speech Therapy Problems (Active)       Problem: Problem Solving STGs       Dates: Start:  23         Goal: STG-Within one week, patient will complete SCCAN with additional goals as appropriate.        Dates: Start:  23    Expected End:  23            Goal: STG-Within one week, patient will answer basic problem solving questions with 70% accuracy given mod verbal cues.         Dates: Start:  23    Expected End:  23               Problem: Speech/Swallowing LTGs       Dates: Start:  23         Goal: LTG-By discharge, patient will solve basic problems Miki for safe discharge       Dates: Start:  23    Expected End:  23

## 2023-06-24 NOTE — ASSESSMENT & PLAN NOTE
2/2 mechanical GLF  S/P L Hip Hemiarthroplasty on 6/9/23 by Dr. Becker   mL  Wound care and pain control per Physiatry  U/S left hip shows fluid collection medial to incision most likely post-op seroma  Do not suspect infxn or hematoma given that pt is clinically doing well, afebrile, has normal WBC, and stable H/H

## 2023-06-24 NOTE — CARE PLAN
The patient is Watcher - Medium risk of patient condition declining or worsening    Shift Goals  Clinical Goals: safety, pain management  Patient Goals: sleep/rest, safety, pain management  Family Goals: none present    Progress made toward(s) clinical / shift goals:    Problem: Fall Risk - Rehab  Goal: Patient will remain free from falls  Outcome: Progressing

## 2023-06-24 NOTE — CARE PLAN
"The patient is Stable - Low risk of patient condition declining or worsening    Shift Goals  Clinical Goals: safety  Patient Goals: sleep/rest, safety, pain management  Family Goals: none present    Progress made toward(s) clinical / shift goals:      Problem: Fall Risk - Rehab  Goal: Patient will remain free from falls  Outcome: Progressing  Note: Brianna Brady Fall risk Assessment Score: 27    High fall risk Interventions   - Alarming seatbelt  - Bed and strip alarm   - Yellow sign by the door   - Yellow wrist band \"Fall risk\"  - Room near to the nurse station  - Do not leave patient unattended in the bathroom  - Fall risk education provided  Pt is using call light for assistance. Has not attempted self transfer. Remains free from from falls.    Patient is not progressing towards the following goals:      Problem: Knowledge Deficit - Standard  Goal: Patient and family/care givers will demonstrate understanding of plan of care, disease process/condition, diagnostic tests and medications  Outcome: Not Progressing   Pt oriented to self only. Reorienting pt with encounters w/t memory loss and confusion.  "

## 2023-06-25 ENCOUNTER — APPOINTMENT (OUTPATIENT)
Dept: OCCUPATIONAL THERAPY | Facility: REHABILITATION | Age: 88
DRG: 560 | End: 2023-06-25
Attending: PHYSICAL MEDICINE & REHABILITATION
Payer: MEDICARE

## 2023-06-25 ENCOUNTER — APPOINTMENT (OUTPATIENT)
Dept: SPEECH THERAPY | Facility: REHABILITATION | Age: 88
DRG: 560 | End: 2023-06-25
Attending: PHYSICAL MEDICINE & REHABILITATION
Payer: MEDICARE

## 2023-06-25 ENCOUNTER — APPOINTMENT (OUTPATIENT)
Dept: PHYSICAL THERAPY | Facility: REHABILITATION | Age: 88
DRG: 560 | End: 2023-06-25
Attending: PHYSICAL MEDICINE & REHABILITATION
Payer: MEDICARE

## 2023-06-25 ENCOUNTER — APPOINTMENT (OUTPATIENT)
Dept: RADIOLOGY | Facility: MEDICAL CENTER | Age: 88
DRG: 560 | End: 2023-06-25
Attending: HOSPITALIST
Payer: MEDICARE

## 2023-06-25 ENCOUNTER — APPOINTMENT (OUTPATIENT)
Dept: RADIOLOGY | Facility: REHABILITATION | Age: 88
DRG: 560 | End: 2023-06-25
Attending: HOSPITALIST
Payer: MEDICARE

## 2023-06-25 LAB
ALBUMIN SERPL BCP-MCNC: 3.3 G/DL (ref 3.2–4.9)
ALBUMIN/GLOB SERPL: 1.1 G/DL
ALP SERPL-CCNC: 104 U/L (ref 30–99)
ALT SERPL-CCNC: 9 U/L (ref 2–50)
ANION GAP SERPL CALC-SCNC: 10 MMOL/L (ref 7–16)
AST SERPL-CCNC: 11 U/L (ref 12–45)
BASOPHILS # BLD AUTO: 0.8 % (ref 0–1.8)
BASOPHILS # BLD: 0.05 K/UL (ref 0–0.12)
BILIRUB SERPL-MCNC: 0.3 MG/DL (ref 0.1–1.5)
BUN SERPL-MCNC: 11 MG/DL (ref 8–22)
CALCIUM ALBUM COR SERPL-MCNC: 9.9 MG/DL (ref 8.5–10.5)
CALCIUM SERPL-MCNC: 9.3 MG/DL (ref 8.5–10.5)
CHLORIDE SERPL-SCNC: 103 MMOL/L (ref 96–112)
CO2 SERPL-SCNC: 26 MMOL/L (ref 20–33)
CREAT SERPL-MCNC: 0.7 MG/DL (ref 0.5–1.4)
EOSINOPHIL # BLD AUTO: 0.28 K/UL (ref 0–0.51)
EOSINOPHIL NFR BLD: 4.6 % (ref 0–6.9)
ERYTHROCYTE [DISTWIDTH] IN BLOOD BY AUTOMATED COUNT: 47.7 FL (ref 35.9–50)
GFR SERPLBLD CREATININE-BSD FMLA CKD-EPI: 84 ML/MIN/1.73 M 2
GLOBULIN SER CALC-MCNC: 3.1 G/DL (ref 1.9–3.5)
GLUCOSE SERPL-MCNC: 96 MG/DL (ref 65–99)
HCT VFR BLD AUTO: 39.5 % (ref 37–47)
HGB BLD-MCNC: 12.9 G/DL (ref 12–16)
IMM GRANULOCYTES # BLD AUTO: 0.02 K/UL (ref 0–0.11)
IMM GRANULOCYTES NFR BLD AUTO: 0.3 % (ref 0–0.9)
LYMPHOCYTES # BLD AUTO: 1.99 K/UL (ref 1–4.8)
LYMPHOCYTES NFR BLD: 32.6 % (ref 22–41)
MAGNESIUM SERPL-MCNC: 2 MG/DL (ref 1.5–2.5)
MCH RBC QN AUTO: 30.4 PG (ref 27–33)
MCHC RBC AUTO-ENTMCNC: 32.7 G/DL (ref 32.2–35.5)
MCV RBC AUTO: 93.2 FL (ref 81.4–97.8)
MONOCYTES # BLD AUTO: 0.53 K/UL (ref 0–0.85)
MONOCYTES NFR BLD AUTO: 8.7 % (ref 0–13.4)
NEUTROPHILS # BLD AUTO: 3.23 K/UL (ref 1.82–7.42)
NEUTROPHILS NFR BLD: 53 % (ref 44–72)
NRBC # BLD AUTO: 0 K/UL
NRBC BLD-RTO: 0 /100 WBC (ref 0–0.2)
PHOSPHATE SERPL-MCNC: 3.2 MG/DL (ref 2.5–4.5)
PLATELET # BLD AUTO: 260 K/UL (ref 164–446)
PMV BLD AUTO: 10.8 FL (ref 9–12.9)
POTASSIUM SERPL-SCNC: 4.2 MMOL/L (ref 3.6–5.5)
PROT SERPL-MCNC: 6.4 G/DL (ref 6–8.2)
RBC # BLD AUTO: 4.24 M/UL (ref 4.2–5.4)
SODIUM SERPL-SCNC: 139 MMOL/L (ref 135–145)
WBC # BLD AUTO: 6.1 K/UL (ref 4.8–10.8)

## 2023-06-25 PROCEDURE — 97530 THERAPEUTIC ACTIVITIES: CPT

## 2023-06-25 PROCEDURE — 97129 THER IVNTJ 1ST 15 MIN: CPT

## 2023-06-25 PROCEDURE — 94760 N-INVAS EAR/PLS OXIMETRY 1: CPT

## 2023-06-25 PROCEDURE — 99232 SBSQ HOSP IP/OBS MODERATE 35: CPT | Performed by: PHYSICAL MEDICINE & REHABILITATION

## 2023-06-25 PROCEDURE — 97130 THER IVNTJ EA ADDL 15 MIN: CPT

## 2023-06-25 PROCEDURE — 80053 COMPREHEN METABOLIC PANEL: CPT

## 2023-06-25 PROCEDURE — 700102 HCHG RX REV CODE 250 W/ 637 OVERRIDE(OP): Performed by: PHYSICAL MEDICINE & REHABILITATION

## 2023-06-25 PROCEDURE — 76882 US LMTD JT/FCL EVL NVASC XTR: CPT | Mod: LT

## 2023-06-25 PROCEDURE — 83735 ASSAY OF MAGNESIUM: CPT

## 2023-06-25 PROCEDURE — 97535 SELF CARE MNGMENT TRAINING: CPT

## 2023-06-25 PROCEDURE — 97116 GAIT TRAINING THERAPY: CPT

## 2023-06-25 PROCEDURE — 770010 HCHG ROOM/CARE - REHAB SEMI PRIVAT*

## 2023-06-25 PROCEDURE — 36415 COLL VENOUS BLD VENIPUNCTURE: CPT

## 2023-06-25 PROCEDURE — 85025 COMPLETE CBC W/AUTO DIFF WBC: CPT

## 2023-06-25 PROCEDURE — 99232 SBSQ HOSP IP/OBS MODERATE 35: CPT | Performed by: HOSPITALIST

## 2023-06-25 PROCEDURE — 84100 ASSAY OF PHOSPHORUS: CPT

## 2023-06-25 PROCEDURE — 700111 HCHG RX REV CODE 636 W/ 250 OVERRIDE (IP): Performed by: PHYSICAL MEDICINE & REHABILITATION

## 2023-06-25 PROCEDURE — A9270 NON-COVERED ITEM OR SERVICE: HCPCS | Performed by: PHYSICAL MEDICINE & REHABILITATION

## 2023-06-25 RX ADMIN — TRAZODONE HYDROCHLORIDE 50 MG: 50 TABLET ORAL at 20:08

## 2023-06-25 RX ADMIN — ENOXAPARIN SODIUM 40 MG: 100 INJECTION SUBCUTANEOUS at 17:06

## 2023-06-25 RX ADMIN — OXYCODONE 5 MG: 5 TABLET ORAL at 13:32

## 2023-06-25 RX ADMIN — LISINOPRIL 5 MG: 5 TABLET ORAL at 09:11

## 2023-06-25 RX ADMIN — DONEPEZIL HYDROCHLORIDE 5 MG: 5 TABLET, FILM COATED ORAL at 20:08

## 2023-06-25 RX ADMIN — OXYCODONE 5 MG: 5 TABLET ORAL at 09:15

## 2023-06-25 RX ADMIN — OMEPRAZOLE 20 MG: 20 CAPSULE, DELAYED RELEASE ORAL at 09:11

## 2023-06-25 RX ADMIN — OXYCODONE HYDROCHLORIDE 10 MG: 10 TABLET ORAL at 20:09

## 2023-06-25 ASSESSMENT — GAIT ASSESSMENTS
DISTANCE (FEET): 80
DEVIATION: ANTALGIC;DECREASED BASE OF SUPPORT;BRADYKINETIC;DECREASED HEEL STRIKE;DECREASED TOE OFF
GAIT LEVEL OF ASSIST: STANDBY ASSIST
ASSISTIVE DEVICE: FRONT WHEEL WALKER

## 2023-06-25 ASSESSMENT — ENCOUNTER SYMPTOMS
SHORTNESS OF BREATH: 0
COUGH: 0
VOMITING: 0
POLYDIPSIA: 0
BRUISES/BLEEDS EASILY: 0
FEVER: 0
CHILLS: 0
PALPITATIONS: 0
ABDOMINAL PAIN: 0
NAUSEA: 0
EYES NEGATIVE: 1
MEMORY LOSS: 1

## 2023-06-25 ASSESSMENT — ACTIVITIES OF DAILY LIVING (ADL)
BED_CHAIR_WHEELCHAIR_TRANSFER_DESCRIPTION: SET-UP OF EQUIPMENT;SUPERVISION FOR SAFETY;VERBAL CUEING
TOILET_TRANSFER_DESCRIPTION: GRAB BAR;INCREASED TIME;SUPERVISION FOR SAFETY;VERBAL CUEING
TOILET_TRANSFER_DESCRIPTION: GRAB BAR;SET-UP OF EQUIPMENT;SUPERVISION FOR SAFETY;VERBAL CUEING
TOILET_TRANSFER_LEVEL_OF_ASSIST: REQUIRES SUPERVISION WITH TOILET TRANSFER
SHOWER_TRANSFER_LEVEL_OF_ASSIST: REQUIRES SUPERVISION WITH SHOWER TRANSFER
TUB_SHOWER_TRANSFER_DESCRIPTION: GRAB BAR;SHOWER BENCH;SET-UP OF EQUIPMENT;SUPERVISION FOR SAFETY;VERBAL CUEING
TOILETING_LEVEL_OF_ASSIST: REQUIRES PHYSICAL ASSIST WITH TOILETING
BED_CHAIR_WHEELCHAIR_TRANSFER_DESCRIPTION: INCREASED TIME;SET-UP OF EQUIPMENT;SUPERVISION FOR SAFETY;VERBAL CUEING

## 2023-06-25 ASSESSMENT — BRIEF INTERVIEW FOR MENTAL STATUS (BIMS)
ASKED TO RECALL BED: YES, NO CUE REQUIRED
INITIAL REPETITION OF BED BLUE SOCK - FIRST ATTEMPT: 3
WHAT YEAR IS IT: CORRECT
BIMS SUMMARY SCORE: 12
WHAT MONTH IS IT: MISSED BY MORE THAN 1 MONTH
WHAT DAY OF THE WEEK IS IT: INCORRECT
ASKED TO RECALL BLUE: YES, NO CUE REQUIRED
ASKED TO RECALL SOCK: YES, NO CUE REQUIRED

## 2023-06-25 ASSESSMENT — PAIN DESCRIPTION - PAIN TYPE: TYPE: ACUTE PAIN

## 2023-06-25 NOTE — DISCHARGE INSTRUCTIONS
[facility] NURSING DISCHARGE INSTRUCTIONS    Blood Pressure : 96/70  Weight: 52.2 kg (115 lb)  Nursing recommendations for Chava Bray at time of discharge are as follows:  Client and Family Member verbalized understanding of all discharge instructions and prescriptions.     Review all your home medications and newly ordered medications with your doctor and/or pharmacist. Follow medication instructions as directed by your doctor and/or pharmacist.    Pain Management:   Discharge Pain Medication Instructions:  Comfort Goal: Stay Alert, Perform Activity, Comfort with Movement  Notify your primary care provider if pain is unrelieved with these measures, if the pain is new, or increased in intensity.    Discharge Skin Characteristics:    Discharge Skin Exam:    Wound 06/09/23 Incision Hip Left (Active)       Wound 06/10/23 Skin Tear Arm Left (Active)   Wound Image   06/21/23 2045   Site Assessment GUALBERTO 06/26/23 0900   Periwound Assessment Pink 06/25/23 0920   Margins Defined edges 06/25/23 0920   Closure Adhesive bandage 06/21/23 2045   Drainage Amount Scant 06/25/23 0920   Drainage Description Serosanguineous 06/25/23 0920   Treatments Cleansed;Site care 06/25/23 0920   Wound Cleansing Normal Saline Irrigation 06/25/23 0920   Periwound Protectant Not Applicable 06/25/23 0920   Dressing Options Silicone Adhesive Foam 06/25/23 2010   Dressing Changed Observed 06/26/23 0900   Dressing Status Clean;Dry;Intact 06/25/23 2010   Dressing Change/Treatment Frequency As Needed 06/25/23 2010   NEXT Weekly Photo (Inpatient Only) 07/03/23 06/25/23 0920   Wound Odor None 06/19/23 2215       Wound 06/12/23 Hip Lateral Left Surgical incision (Active)   Wound Image   06/26/23 1200   Site Assessment Clean;[place] 06/26/23 1200   Periwound Assessment Clean;Dry 06/26/23 1200   Margins Attached edges;Defined edges 06/25/23 2010   Closure Closure strips 06/26/23 1200   Drainage Amount None 06/26/23 1200    Treatments Cleansed;Site care 06/26/23 1200   Wound Cleansing Approved Wound Cleanser 06/21/23 0315   Dressing Options Open to Air 06/26/23 1200   Dressing Changed Observed 06/23/23 1000   Dressing Status Open to Air 06/26/23 1200   Dressing Change/Treatment Frequency As Needed 06/23/23 1000   NEXT Weekly Photo (Inpatient Only) 07/03/23 06/25/23 0920   Wound Odor None 06/21/23 0315     Skin / Wound Care Instructions: Please contact your primary care physician for any change in skin integrity.    If You Have Surgical Incisions / Wounds:  Monitor surgical site(s) for signs of increased swelling, redness or symptoms of drainage from the site or fever as this could indicate signs and symptoms of infection. If these symptoms are noted, notifiy your primary care provider.      Discharge Safety Instructions: Should Have ADULT SUPERVISION     Discharge Safety Concerns: History Of Falls, Unsteady Gait, Weakness  The interdisciplinary team has made recommendation that you should have adult supervision in the house due to impaired judgment, history of falls, weakness, and unsteady gait  Anti-embolic stockings are not required to increase circulation to the lower extremities.    Discharge Diet: Regular     Discharge Liquids: Thins  Discharge Bowel Function: Continent  Please contact your primary care physician for any changes in bowel habits.  Discharge Bowel Program:    Discharge Bladder Function: Continent  Discharge Urinary Devices:        Nursing Discharge Plan:          Discharge Medication Instructions:  Below are the medications your physician expects you to take upon discharge:    Hip Fracture    A hip fracture is a break in the upper part of the thigh bone (femur). This is usually the result of an injury, commonly a fall.  What are the causes?  This condition may be caused by:  A direct hit or injury (trauma) to the side of the hip, such as from a fall or a car accident.  What increases the risk?  You are more likely  to develop this condition if:  You have poor balance or an unsteady walking pattern (gait). Certain conditions contribute to poor balance, including Parkinson disease and dementia.  You have thinning or weakening of your bones, such as from osteopenia or osteoporosis.  You have cancer that spreads to the leg bones.  You have certain conditions that can weaken your bones, such as thyroid disorders, intestine disorders, or a lack (deficiency) of certain nutrients.  You smoke.  You take certain medicines, such as steroids.  You have a history of broken bones.  What are the signs or symptoms?  Symptoms of this condition include:  Pain over the injured hip. This is commonly felt on the side of the hip or in the front groin area.  Stiffness, bruising, and swelling over the hip.  Pain with movement of the leg, especially lifting it up. Pain often gets better with rest.  Difficulty or inability to stand, walk, or use the leg to support body weight (put weight on the leg).  The leg rolling outward when lying down.  The affected leg being shorter than the other leg.  How is this diagnosed?  This condition may be diagnosed based on:  Your symptoms.  A physical exam.  X-rays. These may be done:  To confirm the diagnosis.  To determine the type and location of the fracture.  To check for other injuries.  MRI or CT scans. These may be done if the fracture is not visible on an X-ray.  How is this treated?  Treatment for this condition depends on the severity and location of your fracture. In most cases, surgery is necessary. Surgery may involve:  Repairing the fracture with a screw, nail, or bobbi to hold the bone in place (open reduction and internal fixation, ORIF).  Replacing the damaged parts of the femur with metal implants (hemiarthroplasty or arthroplasty).  If your fracture is less severe, or if you are not eligible for surgery, you may have non-surgical treatment. Non-surgical treatment may involve:  Using crutches, a  walker, or a wheelchair until your health care provider says that you can support (bear) weight on your hip.  Medicines to help reduce pain and swelling.  Having regular X-rays to monitor your fracture and make sure that it is healing.  Physical therapy. You may need physical therapy after surgery, too.  Follow these instructions at home:  Activity  Do not use your injured leg to support your body weight until your health care provider says that you can.  Follow standing and walking restrictions as told by your health care provider.  Use crutches, a walker, or a wheelchair as directed.  Avoid any activities that cause pain or irritation in your hip. Ask your health care provider what activities are safe for you.  Do not drive or use heavy machinery until your health care provider approves.  If physical therapy was prescribed, do exercises as told by your health care provider.  General instructions    Take over-the-counter and prescription medicines only as told by your health care provider.  If directed, put ice on the injured area:  Put ice in a plastic bag.  Place a towel between your skin and the bag.  Leave the ice on for 20 minutes, 2-3 times a day.  Do not use any products that contain nicotine or tobacco, such as cigarettes and e-cigarettes. These can delay bone healing. If you need help quitting, ask your health care provider.  Keep all follow-up visits as told by your health care provider. This is important.  How is this prevented?  To prevent falls at home:  Use a cane, walker, or wheelchair as directed.  Make sure your rooms and hallways are free of clutter, obstacles, and cords.  Install grab bars in your bedroom and bathrooms.  Always use handrails when going up and down stairs.  Use nightlights around the house.  Exercise regularly. Ask what forms of exercise are safe for you, such as walking and strength and balance exercises.  Visit an eye doctor regularly to have your eyesight checked. This can help  prevent falls.  Make sure you get enough calcium and vitamin D.  Do not use any products that contain nicotine or tobacco, such as cigarettes and e-cigarettes. If you need help quitting, ask your health care provider.  Limit alcohol use.  If you have an underlying condition that caused your hip fracture, work with your health care provider to manage your condition.  Contact a health care provider if:  Your pain gets worse or it does not get better with rest or medicine.  You develop any of the following in your leg or foot:  Numbness.  Tingling.  A change in skin color (discoloration).  Skin feeling cold to the touch.  Get help right away if:  Your pain suddenly gets worse.  You cannot move your hip.  Summary  A hip fracture is a break in the upper part of the thigh bone (femur).  Treatment typically requires surgical management to restore stability and function to the hip.  Pain medicine and icing of the affected leg can help manage pain and swelling. Follow directions as told by your health care provider.  This information is not intended to replace advice given to you by your health care provider. Make sure you discuss any questions you have with your health care provider.  Document Revised: 08/20/2021 Document Reviewed: 08/20/2021  [employer] Patient Education © 2022 [employer]    Pain Medicine Instructions  You may need pain medicine after an injury or illness. Two common types of pain medicine are:  Non-opioid pain medicine. This includes NSAIDs.  Opioid pain medicine. These may be called opioids.  Pain medicine may not make all of your pain go away. It should make you comfortable enough to move, breathe, and do normal activities.  How can pain medicines affect me?  Pain medicines can cause side effects such as:  Vomiting or feeling like you may vomit.  Belly pain.  Opioids can cause other side effects, such as:  Trouble pooping (constipation).  Feeling very sleepy.  Confusion.  Trouble breathing.  Addiction  to opioids. This means that you will take the medicine even though it hurts your health.  Taking opioids for longer than 3 days raises your risk of these side effects.  Taking opioids for a long time can affect how well you can do daily tasks. It also puts you at risk for:  Car crashes.  Depression.  Suicide.  Heart attack.  If you do not take pain medicines correctly, you may be at risk for:  Liver problems.  Kidney problems.  Taking too much of the medicine (overdose). This can lead to death.  What actions can I take to lower my risk of problems?  Know your treatment plan  Talk about your treatment plan with your doctor. Both you and your doctor should agree on how you should be treated.  Talk about the goals of your treatment, including:  How much pain you might expect to have.  How you will manage the pain.  Ask your doctor if you can see other doctors who can treat your pain without using medicine. This can include physical therapy and counseling.  Talk about the risks and benefits of taking these medicines for your condition.  Tell your doctor about the amount of medicines you take and about any use of drugs or alcohol.  Get your pain medicine prescriptions from only one doctor.  Keep all follow-up visits.  Take your medicine as told    Take pain medicine exactly as told by your doctor. Take it only when you need it.  If your pain is not too bad, you may take less medicine if your doctor allows.  If you have no pain, do not take the medicine unless your doctor tells you to take it.  If your pain is very bad, do not take more medicine than your doctor tells you to take. Call your doctor to know what to do.  If your pain medicine has acetaminophen in it, do not take any other acetaminophen while you are taking this medicine. Too much can damage the liver.  Write down the times when you take your pain medicine. Look at the times before you take your next dose.  Take other over-the-counter or prescription  medicines only as told by your doctor.  Avoid certain activities  While you are taking prescription pain medicine, and for 8 hours after your last dose:  Do not drive.  Do not use machinery.  Do not use power tools.  Do not sign legal documents.  Do not drink alcohol.  Do not take sleeping pills.  Do not take care of children by yourself.  Do not do any activities that involve climbing or being in high places.  Do not go to a lake, river, ocean, spa, or swimming pool unless an adult is nearby who can monitor and help you.    Keep pets and people safe  Store your medicine as told by your doctor. Keep it where children and pets cannot reach it.  Do not share your pain medicine with anyone.  Do not save unused pills. If you have unused pills, you can:  Bring them to a take-back program.  Bring them to a pharmacy that takes back unused pills.  Throw them in the trash. Check the medicine label or package insert to see if it is safe to throw it out. If it is safe, take the medicine out of the container. Mix it with something that makes it unusable, such as pet waste. Then put the medicine in the trash.  Flush them down the toilet only if this is safe to do. To find out:  Check the label or package insert of your medicine.  Read information given by the Food and Drug Administration website: fda.gov  Treat or prevent constipation  You may need to take these actions to prevent or treat constipation:  Drink enough fluid to keep your pee (urine) pale yellow.  Take over-the-counter or prescription medicines.  Eat foods that are high in fiber. These include beans, whole grains, and fresh fruits and vegetables.  Limit foods that are high in fat and sugar. These include fried or sweet foods.  Contact a doctor if:  Your medicine is not helping with your pain.  You have a rash.  You feel sick to your stomach.  You throw up.  You feel depressed.  Get help right away if:  You have trouble breathing. This means:  Breathing that is  "slower than normal.  Breathing that is more shallow than normal.  You are confused.  You are sleeping a lot, or you have trouble staying awake.  Your skin or lips turn pale or bluish in color.  You tongue swells.  You have thoughts of harming yourself or harming others.  These symptoms may be an emergency. Get help right away. Call your local emergency services (913 in the [place]  Do not wait to see if the symptoms will go away.  Do not drive yourself to the hospital.  Get help right away if you feel like you may hurt yourself or others, or have thoughts about taking your own life. Go to your nearest emergency room or:  Call your local emergency services (078 in the [place]  Call the National Suicide Prevention Lifeline at 1-689.263.9870 or 544 in the [place] This is open 24 hours a day.  Text the Crisis Text Line at 420122.  Summary  Pain medicine can help lower your pain. It may also cause side effects.  Take your pain medicine exactly as told by your doctor.  Talk with your doctor about other ways to manage your pain.  Ask what activities you should avoid while taking pain medicine.  This information is not intended to replace advice given to you by your health care provider. Make sure you discuss any questions you have with your health care provider.  Document Revised: 07/13/2022 Document Reviewed: 04/27/2022  [employer] Patient Education © 2023 [employer]    Prevent Falls in Your Home    \"Falling once doubles your chance of falling again\"        -Center for Disease Control and Prevention    Falls in the home can lead to serious injury (fractures, brain injuries), hospitalizations, increased medical costs, and could even be fatal.  The good news is, there are many precautions you can take to avoid falls in your home and help keep you safe:     If prescribed an assistive device (walker, crutches), use as instructed by the healthcare provider\"   Remove any tripping hazards from your home, including loose cords, throw " rugs and clutter  Keep a nightlight on in dark (hallways, bathrooms, etc)   Get up slowly, to make sure you feel okay before getting up  Be aware of any side effects of your medications: some medications may make you dizzy  Place a non-skid rubber mat in your shower or tub-consider a shower bench or chair if unsteady on your feet  Wear supportive shoes or non-skid socks when moving around  Start an exercise program once approved by your provider.  If you are feeling weak following a hospital stay, talk to your doctor about home health or outpatient therapy programs designed to help rebuild your strength and endurance    Depression / Suicide Risk    As you are discharged from this [facility] facility, it is important to learn how to keep safe from harming yourself.    Recognize the warning signs:  Abrupt changes in personality, positive or negative- including increase in energy   Giving away possessions  Change in eating patterns- significant weight changes-  positive or negative  Change in sleeping patterns- unable to sleep or sleeping all the time   Unwillingness or inability to communicate  Depression  Unusual sadness, discouragement and loneliness  Talk of wanting to die  Neglect of personal appearance   Rebelliousness- reckless behavior  Withdrawal from people/activities they love  Confusion- inability to concentrate     If you or a loved one observes any of these behaviors or has concerns about self-harm, here's what you can do:  Talk about it- your feelings and reasons for harming yourself  Remove any means that you might use to hurt yourself (examples: pills, rope, extension cords, firearm)  Get professional help from the community (Mental Health, Substance Abuse, psychological counseling)  Do not be alone:Call your Safe Contact- someone whom you trust who will be there for you.  Call your local CRISIS HOTLINE 066-6731 or 307-861-9691  Call your local Children's Mobile Crisis Response Team [facility]  (837) 688-5733 or www.Buzzero  Call the toll free National Suicide Prevention Hotlines   National Suicide Prevention Lifeline 852-043-IYMW (3088)  [facility] Line Network 800-SUICIDE (904-7124)        Occupational Therapy Discharge Instructions for Chava Mayfieldregor    6/25/2023    Level of Assist Required for Eating: Able to Complete Eating without Assist  Level of Assist Required for Grooming: Able to Complete Grooming without Assist  Level of Assist Required for Dressing: Requires Supervision with Dressing  Equipment for Dressing: Sock Aid, Reacher, Long Handled Shoe Horn, Dressing Stick, Elastic Shoe Laces  Level of Assist Required for Toileting: Requires Physical Assist with Toileting  Level of Assist Required for Toilet Transfer: Requires Supervision with Toilet Transfer  Equipment for Toilet Transfer: Grab Bars by Toilet, Other (walker)  Level of Assist Required for Bathing: Requires Supervision with Bathing  Equipment for Bathing: Shower Chair, Grab Bars in Tub / Shower, Hand Held Shower Head, Long Handled Sponge  Level of Assist Required for Shower Transfer: Requires Supervision with Shower Transfer  Equipment for Shower Transfer: Grab Bars in Tub / Shower, Shower Chair  Level of Assist Required for Home Mgmt: Requires Physical Assist with Home Management  Level of Assist Required for Meal Prep: Requires Physical Assist with Meal Preparation  Driving: May not Drive, Please Contact Physician for Further Information  Home Exercise Program: None Issued      Physical Therapy Discharge Instructions for Chava Mayfieldregor    6/25/2023    Weight Bearing Status - Patient Should: Bear Weight as Tolerated Left Leg (with left leg posterior hip prx)  Level of Assist Required for Ambulation: Supervision on Flat Surfaces, Physical Assist on Curbs, Physical Assist on Stairs  Distance Patient May Ambulate: household as tolerated  Device Recommended for Ambulation: Front-Wheeled Walker  Level of Assist  Required for Transfers: Supervision  Device Recommended for Transfers: Front-Wheeled Walker    Speech Therapy Discharge Instructions for Chava Bray    6/26/2023    Diet: Regular (7), Thin (0)  Supervision: intermittent supervision during meals  Cognition / Communication: Pt will need assistance with medication and financial managment.  Recommend use of calendar/written cues to aid in orientation and recall.  It was a pleasure working with you!  We wish you the best in your continued recovery!  Yosvany Marte, MS CCC-SLP

## 2023-06-25 NOTE — THERAPY
Recreational Therapy  Daily Treatment     Patient Name: Chava Bray  AGE:  87 y.o., SEX:  female  Medical Record #: [id number]  Today's Date: 6/25/2023       Subjective    Patient ready and agreeable to recreation therapy session.      Objective       06/25/23 1301   Procedural Tracking   Procedural Tracking Community Re-Integration;Community Skills Development;Cognitive Skills Training;Fine Motor Functional Leisure Skills;Group Treatment;Gross Motor Functional Leisure Skills;Social Skills Training;Leisure Skills Development;Leisure Skills Awareness   Treatment Time   Total Time Spent (mins) 30   Total Time Missed 30   Reasons for Time Missed Medical-Patient With Nursing   Functional Ability Status - Cognitive   Attention Span Remains on Task with Cueing   Comprehension Follows One Step Commands;Requires Cueing   Judgment Needs Assistance;Impaired;Confused   Functional Ability Status - Emotional    Affect Appropriate   Mood Appropriate   Behavior Appropriate   Skilled Intervention    Skilled Intervention Gross Motor Leisure;Fine Motor Leisure;Cognitive Leisure;Social Skills;Group Participation   Skilled Intervention Comments ONE Change in the Corner   Interdisciplinary Plan of Care Collaboration   IDT Collaboration with  Nursing;Therapy Tech   Patient Position at End of Therapy Seated;Other (Comments)  (with therapy tech)   Strengths & Barriers   Strengths Able to follow instructions;Effective communication skills;Willingly participates in therapeutic activities;Pleasant and cooperative   Barriers Decreased endurance;Fatigue;Generalized weakness;Difficulty following instructions;Impaired activity tolerance;Impaired insight/denial of deficits;Impaired functional cognition;Limited mobility         Assessment    Patient participated in recreation therapy group session with previously learned game, [place] in the [place]. Patient was socially acceptable throughout group. Patient required mod verbal/visual cues for  scanning, sequencing, recall, attention, concentration and strategy. Patient required min A for fine/gross motor aspects of group, benefiting from the use of a card hussein.       Strengths: (P) Able to follow instructions, Effective communication skills, Willingly participates in therapeutic activities, Pleasant and cooperative  Barriers: (P) Decreased endurance, Fatigue, Generalized weakness, Difficulty following instructions, Impaired activity tolerance, Impaired insight/denial of deficits, Impaired functional cognition, Limited mobility    Plan    Patient will benefit from continued recreation therapy sessions.     Passport items to be completed:  Verbalize two positive leisure activities, discuss returning to work, hobbies, community groups or volunteer activities, explore community resources

## 2023-06-25 NOTE — FLOWSHEET NOTE
06/25/23 1116   Events/Summary/Plan   Events/Summary/Plan spot check done pt has been on room air since 6/14 doing well   Vital Signs   Pulse 90   Respiration 16   Pulse Oximetry 94 %   $ Pulse Oximetry (Spot Check) Yes   Respiratory Assessment   Respiratory Pattern Within Normal Limits   Level of Consciousness Alert   Chest Exam   Work Of Breathing / Effort Within Normal Limits   Oxygen   O2 (LPM) 0   FiO2% 21 %   O2 Delivery Device Room air w/o2 available

## 2023-06-25 NOTE — THERAPY
"Missed Therapy    Patient Name: Chava Bray  Age:  87 y.o., Sex:  female  Medical Record #: [id number]  Today's Date: 6/25/2023    Discussed missed therapy with RN, physician, and therapy . Pt received awake in bed but refusing to participate, repeatedly stating \"no\" when this therapist offered to assist her to the bathroom or don pants. Pt reported that she did not sleep well last night plus \"I had surgery on this leg and I don't want to move it.\" Pt unable to demonstrate understanding of edu re: importance of movement for healing process. Informed pt that she has more therapy scheduled later today, pt stated that she will participate when she is more awake.    Will attempt to make up 30 min of this session later today as able.       06/25/23 0701   Therapy Missed   Missed Therapy (Minutes) 60   Reason For Missed Therapy Non-Medical - Patient Refused  (pt refusing therapy)   Interdisciplinary Plan of Care Collaboration   IDT Collaboration with  Nursing;Physician   Patient Position at End of Therapy In Bed;Call Light within Reach;Tray Table within Reach;Phone within Reach   Collaboration Comments notified re: pt refusal       "

## 2023-06-25 NOTE — PROGRESS NOTES
"  Physical Medicine & Rehabilitation Progress Note    Encounter Date: 2023    Chief Complaint: Decreased mobility, constipation    Interval Events (Subjective):    The patient was seen in [facility] in room after a shower.  Her  was at bedside.  Discussed that she has never been easy in the morning, refused 7AM PT this morning, but feels good after the shower.      No new ROS complaints.  Slept okay by her report.   Some leg pain, but pretty manageable.    _____________________________________  Interdisciplinary Team Conference   Most recent IDT on 2023    Discharge Date/Disposition:  23  _____________________________________      Objective:  VITAL SIGNS: /65   Pulse 90   Temp 37.2 °C (98.9 °F) (Oral)   Resp 16   Ht 1.6 m (5' 3\")   Wt 52.2 kg (115 lb)   SpO2 94%   BMI 20.37 kg/m²   Gen: NAD  Psych: Mood and affect appropriate  CV: RRR, no edema  Resp: CTAB, no upper airway sounds  Abd: soft, non-tender, non-distended  Neuro:Alert and cooperative, follows commands.  Reports the day of the oriented to year, self, situation, day of week 'Wednesday'     Laboratory Values:  Recent Results (from the past 72 hour(s))   EKG    Collection Time: 23  9:21 AM   Result Value Ref Range    Report       [facility] Emergency Dept.    Test Date:  2023  Pt Name:    RACHANA LINTON           Department: [facility]  MRN:        [id number]                      Room:       [facility]  Gender:     Female                       Technician: [id number]   :        [id number]                   Requested By:GRAEME GODOY  Order #:    [id number]                    Reading MD:    Measurements  Intervals                                Axis  Rate:       90                           P:          81  WA:         152                          QRS:        56  QRSD:       99                           T:          5  QT:         393  QTc:        481    Interpretive Statements  Sinus rhythm  Ventricular " bigeminy  Left atrial enlargement  Minimal ST depression, diffuse leads  Compared to ECG 06/08/2023 09:07:35  ST (T wave) deviation now present  Left ventricular hypertrophy no longer present  Early repolarization no longer present     CBC WITH DIFFERENTIAL    Collection Time: 06/25/23  5:32 AM   Result Value Ref Range    WBC 6.1 4.8 - 10.8 K/uL    RBC 4.24 4.20 - 5.40 M/uL    Hemoglobin 12.9 12.0 - 16.0 g/dL    Hematocrit 39.5 37.0 - 47.0 %    MCV 93.2 81.4 - 97.8 fL    MCH 30.4 27.0 - 33.0 pg    MCHC 32.7 32.2 - 35.5 g/dL    RDW 47.7 35.9 - 50.0 fL    Platelet Count 260 164 - 446 K/uL    MPV 10.8 9.0 - 12.9 fL    Neutrophils-Polys 53.00 44.00 - 72.00 %    Lymphocytes 32.60 22.00 - 41.00 %    Monocytes 8.70 0.00 - 13.40 %    Eosinophils 4.60 0.00 - 6.90 %    Basophils 0.80 0.00 - 1.80 %    Immature Granulocytes 0.30 0.00 - 0.90 %    Nucleated RBC 0.00 0.00 - 0.20 /100 WBC    Neutrophils (Absolute) 3.23 1.82 - 7.42 K/uL    Lymphs (Absolute) 1.99 1.00 - 4.80 K/uL    Monos (Absolute) 0.53 0.00 - 0.85 K/uL    Eos (Absolute) 0.28 0.00 - 0.51 K/uL    Baso (Absolute) 0.05 0.00 - 0.12 K/uL    Immature Granulocytes (abs) 0.02 0.00 - 0.11 K/uL    NRBC (Absolute) 0.00 K/uL   Comp Metabolic Panel    Collection Time: 06/25/23  5:32 AM   Result Value Ref Range    Sodium 139 135 - 145 mmol/L    Potassium 4.2 3.6 - 5.5 mmol/L    Chloride 103 96 - 112 mmol/L    Co2 26 20 - 33 mmol/L    Anion Gap 10.0 7.0 - 16.0    Glucose 96 65 - 99 mg/dL    Bun 11 8 - 22 mg/dL    Creatinine 0.70 0.50 - 1.40 mg/dL    Calcium 9.3 8.5 - 10.5 mg/dL    AST(SGOT) 11 (L) 12 - 45 U/L    ALT(SGPT) 9 2 - 50 U/L    Alkaline Phosphatase 104 (H) 30 - 99 U/L    Total Bilirubin 0.3 0.1 - 1.5 mg/dL    Albumin 3.3 3.2 - 4.9 g/dL    Total Protein 6.4 6.0 - 8.2 g/dL    Globulin 3.1 1.9 - 3.5 g/dL    A-G Ratio 1.1 g/dL   MAGNESIUM    Collection Time: 06/25/23  5:32 AM   Result Value Ref Range    Magnesium 2.0 1.5 - 2.5 mg/dL   PHOSPHORUS    Collection Time: 06/25/23   5:32 AM   Result Value Ref Range    Phosphorus 3.2 2.5 - 4.5 mg/dL   CORRECTED CALCIUM    Collection Time: 06/25/23  5:32 AM   Result Value Ref Range    Correct Calcium 9.9 8.5 - 10.5 mg/dL   ESTIMATED GFR    Collection Time: 06/25/23  5:32 AM   Result Value Ref Range    GFR (CKD-EPI) 84 >60 mL/min/1.73 m 2         Medications:  Scheduled Medications   Medication Dose Frequency    donepezil  5 mg Q EVENING    lisinopril  5 mg DAILY    senna-docusate  2 Tablet BID    And    polyethylene glycol/lytes  1 Packet DAILY    Pharmacy Consult Request  1 Each PHARMACY TO DOSE    omeprazole  20 mg DAILY    enoxaparin (LOVENOX) injection  40 mg DAILY AT 1800     PRN medications: senna-docusate **AND** polyethylene glycol/lytes **AND** magnesium hydroxide **AND** bisacodyl, Respiratory Therapy Consult, hydrALAZINE, acetaminophen, mag hydrox-al hydrox-simeth, ondansetron **OR** ondansetron, traZODone, sodium chloride, oxyCODONE immediate-release **OR** oxyCODONE immediate-release    Diet:  Current Diet Order   Procedures    Diet Order Diet: Regular       Medical Decision Making and Plan:  Left hip fracture - Patient with mechanical fall at home with left hip fracture s/p hemiarthroplasty on 6/9/23 with Dr. Becker. Patient is WBAT, posterior precautions.   -PT and OT for mobility and ADLs. Per guidelines, 15 hours per week between PT, OT and/or SLP.  -Follow-up Dr. Becker     HTN - Previously on Lisinopril but low SBP after fall. Will monitor. Into 110s, will monitor. Occasiona low 140s, will start 5 mg Lisinopril. Continue Lisinopril.     Azotemia - Worsening after surgery. Check AM CMP - improved to 15    Bradycardia- EKG obtained.  Consulted Hospitalist.  Discussed case.  Appreciate input.  Dr. Phillips did not see acute changes from previous EKG.  Pulses 52-90 06/25/2023.     Hypothyroidism - Patient was on Levothyroxine in the past. Currently not on it. Will check TSH - wnl      Neuropathic pain - Patient with history of  neuropathic pain from spinal stenosis. Previously on Gabapentin.  Not restarted.     Dysuria - Developed dysuria, UA + for UTI. Start Omnicef and send for cultures. PharmD recommending change to Macrobid. Ucx with E coli, completed macrobid. Resolved    Pain - Patient on Tylenol scheduled and PRN oxycodone. Discontinue Tylenol     Dementia - Does not appear to be on any medications. Will consult SLP. Start Aricept 5 mg QHS. Continue Aricept    Skin - Patient at risk for skin breakdown due to debility in areas including sacrum, achilles, elbows and head in addition to other sites. Nursing to assess skin daily.      GI Ppx - Patient on Prilosec for GERD prophylaxis. Patient on Senna-docusate for constipation prophylaxis.   -Severe constipation, mild distention, give Miralax. KUB on 6/13/23 - mild constipation in distal colon. Continue PPI and Miralax    DVT Ppx - Patient Lovenox on transfer.  ____________________________________    Harrison Menard MD  [facility] - Physical Medicine & Rehabilitation   [facility] - Pain Medicine   ____________________________________

## 2023-06-25 NOTE — CARE PLAN
Problem: OT Long Term Goals  Goal: LTG-By discharge, patient will complete basic self care tasks at supv to mod I level using AE as needed to maintain hip prec.   Outcome: Not Met  Goal: LTG-By discharge, patient will perform bathroom transfers at supv to mod I level using AD/DME as needed.   Outcome: Not Met

## 2023-06-25 NOTE — CARE PLAN
"The patient is Stable - Low risk of patient condition declining or worsening    Shift Goals  Clinical Goals: safety  Patient Goals: sleep well  Family Goals: none present    Progress made toward(s) clinical / shift goals: 2    Problem: Pain - Standard  Goal: Alleviation of pain or a reduction in pain to the patient’s comfort goal  Outcome: Progressing: PRN oxycodone 5 mg given at 0915 and 1332 for pt c/o 6/10 pain to left hip, reports decrease in pain upon reassessments, participating in therapies.     Problem: Fall Risk - Rehab  Goal: Patient will remain free from falls  Outcome: Progressing  Note: Brianna Brady Fall risk Assessment Score: 30    High fall risk Interventions   - Alarming seatbelt  - Bed and strip alarm   - Yellow sign by the door   - Yellow wrist band \"Fall risk\"  - Room near to the nurse station  - Do not leave patient unattended in the bathroom  - Fall risk education provided  Pt orientation varies from oriented to self to oriented to self, place, and situation. Pt has been using call light for assistance this shift. Waits for staff.    Patient is not progressing towards the following goals:      "

## 2023-06-25 NOTE — THERAPY
Occupational Therapy  Daily Treatment     Patient Name: Chava Bray  Age:  87 y.o., Sex:  female  Medical Record #: [id number]  Today's Date: 6/25/2023     Precautions  Precautions: (P) Fall Risk, Posterior Hip Precautions, Weight Bearing As Tolerated Left Lower Extremity  Comments: Hx Dementia, impaired safety         Subjective    Patient was awake and in bed in nightgown.  She was initially resistant to participate in OT stating her hip hurt and she was tired, but eventually agreeable with encouragement.     Objective       06/25/23 0931   OT Charge Group   OT Self Care / ADL (Units) 3   OT Therapy Activity (Units) 1   OT Total Time Spent   OT Individual Total Time Spent (Mins) 60   Precautions   Precautions Fall Risk;Posterior Hip Precautions;Weight Bearing As Tolerated Left Lower Extremity   Pain 0 - 10 Group   Location Hip   Location Orientation Left   Cognition    Safety Awareness Impaired   New Learning Impaired   Sequencing Impaired   Functional Level of Assist   Grooming Supervision;Seated   Grooming Description Supervision for safety;Verbal cueing   Bathing Standby Assist   Bathing Description Grab bar;Hand held shower;Set-up of equipment;Supervision for safety;Verbal cueing;Long handled bath tool   Upper Body Dressing Supervision   Upper Body Dressing Description Set-up of equipment;Supervision for safety;Verbal cueing   Lower Body Dressing Standby Assist   Lower Body Dressing Description Reacher;Sock aid;Dressing stick;Increased time;Supervision for safety;Set-up of equipment;Verbal cueing   Bed, Chair, Wheelchair Transfer Minimal Assist   Bed Chair Wheelchair Transfer Description Set-up of equipment;Supervision for safety;Verbal cueing  (SPT no AD)   Toilet Transfers Standby Assist   Toilet Transfer Description Grab bar;Set-up of equipment;Supervision for safety;Verbal cueing   Tub / Shower Transfers Standby Assist   Tub Shower Transfer Description Grab bar;Shower bench;Set-up of  equipment;Supervision for safety;Verbal cueing   Bed Mobility    Supine to Sit Minimal Assist   Scooting Standby Assist   Interdisciplinary Plan of Care Collaboration   IDT Collaboration with  Family / Caregiver   Patient Position at End of Therapy Seated;Chair Alarm On;Self Releasing Lap Belt Applied;Call Light within Reach;Phone within Reach;Tray Table within Reach;Family / Friend in Room   Collaboration Comments Spouse Shawn present at end of session     Continued education on posterior hip precautions and how to use AE.    Assessment    Patient demonstrated improvements in some ADL performance scores, but requires cues and education for hip precautions and when/how to use AE.  Ready to d/c home tomorrow with spouse.  Strengths: Motivated for self care and independence, Pleasant and cooperative, Supportive family, Willingly participates in therapeutic activities  Barriers: Decreased endurance, Fatigue, Generalized weakness, Home accessibility, Impaired activity tolerance, Impaired balance, Impaired carryover of learning, Impaired insight/denial of deficits, Impaired functional cognition, Limited mobility, Pain, Pain poorly managed    Plan    D/C home tomorrow per primary OT and CM notes    Occupational Therapy Goals (Active)       Problem: Functional Transfers       Dates: Start:  06/22/23         Goal: STG-Within one week, patient will transfer to tub/shower with SBA using AD/DME prn.        Dates: Start:  06/22/23               Problem: OT Long Term Goals       Dates: Start:  06/13/23         Goal: LTG-By discharge, patient will complete basic self care tasks at supv to mod I level using AE as needed to maintain hip prec.        Dates: Start:  06/13/23    Expected End:  06/30/23            Goal: LTG-By discharge, patient will perform bathroom transfers at supv to mod I level using AD/DME as needed.        Dates: Start:  06/13/23    Expected End:  06/30/23               Problem: Toileting       Dates: Start:   06/22/23         Goal: STG-Within one week, patient will complete toileting tasks with SBA using AE prn.        Dates: Start:  06/22/23

## 2023-06-25 NOTE — THERAPY
Physical Therapy   Discharge Summary     Patient Name: Chava Bray  Age:  87 y.o., Sex:  female  Medical Record #: [id number]  Today's Date: 6/25/2023     Precautions  Precautions: Fall Risk, Posterior Hip Precautions, Weight Bearing As Tolerated Left Lower Extremity  Comments: Hx Dementia, impaired safety    Subjective    Pt received resting in bed, agreeable to participate to make up missed minutes from this am. Unclear in Epic when pt is d/c'ing home so completed d/c assessment as below.     Objective       06/25/23 1431   PT Charge Group   PT Gait Training (Units) 1   PT Therapeutic Activities (Units) 1   PT Total Time Spent   PT Individual Total Time Spent (Mins) 30   Cognition    Safety Awareness Impaired   New Learning Impaired   Attention Impaired   Gait Functional Level of Assist    Gait Level Of Assist Standby Assist   Assistive Device Front Wheel Walker   Distance (Feet) 80   # of Times Distance was Traveled 1   Deviation Antalgic;Decreased Base Of Support;Bradykinetic;Decreased Heel Strike;Decreased Toe Off   Stairs Functional Level of Assist   Level of Assist with Stairs Refused   Transfer Functional Level of Assist   Bed, Chair, Wheelchair Transfer Contact Guard Assist   Bed Chair Wheelchair Transfer Description Increased time;Set-up of equipment;Supervision for safety;Verbal cueing  (stand step wc<>bed no AD with bed rail)   Toilet Transfers Contact Guard Assist   Toilet Transfer Description Grab bar;Increased time;Supervision for safety;Verbal cueing  (FWW<>toilet)   Bed Mobility    Supine to Sit Standby Assist  (HOBE)   Sit to Supine Standby Assist   Sit to Stand Standby Assist   Interdisciplinary Plan of Care Collaboration   Patient Position at End of Therapy In Bed;Bed Alarm On;Call Light within Reach;Tray Table within Reach;Phone within Reach   Roll Left and Right   Assistance Needed Independent   CARE Score - Roll Left and Right 6   Sit to Lying   Assistance Needed Independent   CARE Score  - Sit to Lying 6   Lying to Sitting on Side of Bed   Assistance Needed Independent   CARE Score - Lying to Sitting on Side of Bed 6   Sit to Stand   Assistance Needed Independent;Adaptive equipment   CARE Score - Sit to Stand 6   Chair/Bed-to-Chair Transfer   Assistance Needed Independent;Adaptive equipment   CARE Score - Chair/Bed-to-Chair Transfer 6   Car Transfer   Reason if not Attempted Environmental limitations   CARE Score - Car Transfer 10   Walk 10 Feet   Assistance Needed Independent;Adaptive equipment   CARE Score - Walk 10 Feet 6   Walk 50 Feet with Two Turns   Assistance Needed Independent;Adaptive equipment   CARE Score - Walk 50 Feet with Two Turns 6   Walk 150 Feet   Reason if not Attempted Safety concerns   CARE Score - Walk 150 Feet 88   Walking 10 Feet on Uneven Surfaces   Reason if not Attempted Safety concerns   CARE Score - Walking 10 Feet on Uneven Surfaces 88   1 Step (Curb)   Assistance Needed Physical assistance   Physical Assistance Level 25% or less   CARE Score - 1 Step (Curb) 3   4 Steps   Assistance Needed Physical assistance   Physical Assistance Level 25% or less   CARE Score - 4 Steps 3   12 Steps   Reason if not Attempted Safety concerns   CARE Score - 12 Steps 88   Picking Up Object   Assistance Needed Set-up / clean-up;Adaptive equipment   Comment reacher d/t posterior hip prx   CARE Score - Picking Up Object 5   Wheel 50 Feet with Two Turns   Reason if not Attempted Activity not applicable   CARE Score - Wheel 50 Feet with Two Turns 9   Wheel 150 Feet   Reason if not Attempted Activity not applicable   CARE Score - Wheel 150 Feet 9   P.T. Discharge Summary   Discharge Location Home   Patient Discharging with Assist of Spouse / Significant Other   Level of Supervision Required Upon Discharge Twenty Four Hour Supervision   Recommended Equipment for Discharge Front-Wheeled Walker;Reacher   Recommeded Services Upon Discharge Home Health Physical Therapy   Long Term Goals Met 0    Long Term Goals Not Met 5   Reason(s) for Goals Not Met Pt remains limited by impaired cognition d/t hx of dementia, requires CGA-SBA for all functional mobility at this time   Criteria for Termination of Services Maximum Function Achieved for Inpatient Rehabilitation   Discharge Instructions to Patient   Weight Bearing Status - Patient Should Bear Weight as Tolerated Left Leg  (with left leg posterior hip prx)   Level of Assist Required for Ambulation Supervision on Flat Surfaces;Physical Assist on Curbs;Physical Assist on Stairs   Distance Patient May Ambulate household as tolerated   Device Recommended for Ambulation Front-Wheeled Walker   Level of Assist Required for Transfers Supervision   Device Recommended for Transfers Front-Wheeled Walker     See d/c IRF MARKUS flowsheet above for d/c assessment completed this session.     Toileting: Pt able to perform clothing mgt with encouragement plus hygiene, required CGA-SBA for balance in standing and GB support. Washed hands while standing at bathroom sink with SBA.    Pt politely declined to perform stair navigation d/t hip pain.    Pt recalled 2/3 posterior hip prx indep with cueing for third (no bending).    Assessment    Pt denies any further questions for this therapist at this time. Remains primarily limited by cognitive impairments and will likely require 24/7 SPV upon d/c for safety. Pt has progressed in therapy as above and is appropriate for anticipated d/c to home on 06/26/23 with spouse support.     Strengths: Independent prior level of function, Pleasant and cooperative  Barriers: Constipation, Decreased endurance, Fatigue, Generalized weakness, Impaired activity tolerance, Impaired balance, Impaired carryover of learning, Impaired insight/denial of deficits, Impaired functional cognition, Limited mobility, Pain    Plan    Anticipated d/c to home on 06/26/23 with spouse support    Physical Therapy Problems (Active)       There are no active problems.

## 2023-06-25 NOTE — THERAPY
Speech Language Pathology  Daily Treatment     Patient Name: Chava Bray  Age:  87 y.o., Sex:  female  Medical Record #: [id number]  Today's Date: 2023     Precautions  Precautions: Fall Risk, Posterior Hip Precautions, Weight Bearing As Tolerated Left Lower Extremity  Comments: Hx Dementia, impaired safety    Subjective    Pt pleasant and cooperative during tx     Objective       23 1531   Treatment Charges   SLP Cognitive Skill Development First 15 Minutes 1   SLP Cognitive Skill Development Additional 15 Minutes 1   SLP Total Time Spent   SLP Individual Total Time Spent (Mins) 30   Cognition   Orientation  Minimal (4)   Visual Scanning / Cancellation Skills Minimal (4)         Assessment    O-lo/30 (improved from ).  Single target cancellation: 90% Miki, 100% given mod verbal cues.  Pt required mod cues to locate targets in a word search.    Strengths: Supportive family  Barriers: Decreased endurance, Impaired functional cognition, Impaired insight/denial of deficits, Impaired carryover of learning, Uncooperative    Plan    O-log, attn, recall        Speech Therapy Problems (Active)       Problem: Problem Solving STGs       Dates: Start:  23         Goal: STG-Within one week, patient will complete SCCAN with additional goals as appropriate.        Dates: Start:  23    Expected End:  23            Goal: STG-Within one week, patient will answer basic problem solving questions with 70% accuracy given mod verbal cues.         Dates: Start:  23    Expected End:  23               Problem: Speech/Swallowing LTGs       Dates: Start:  23         Goal: LTG-By discharge, patient will solve basic problems Miki for safe discharge       Dates: Start:  23    Expected End:  23

## 2023-06-25 NOTE — PROGRESS NOTES
Hospital Medicine Daily Progress Note      Chief Complaint  Bradycardia  Hypertension    Interval Problem Update  Pt denies new complaints.  Blood pressures labile.  Labs reviewed and discussed.    Review of Systems  Review of Systems   Constitutional:  Negative for chills and fever.   HENT: Negative.     Eyes: Negative.    Respiratory:  Negative for cough and shortness of breath.    Cardiovascular:  Negative for chest pain and palpitations.   Gastrointestinal:  Negative for abdominal pain, nausea and vomiting.   Genitourinary: Negative.    Musculoskeletal:  Positive for joint pain.   Skin:  Negative for itching and rash.   Endo/Heme/Allergies:  Negative for polydipsia. Does not bruise/bleed easily.   Psychiatric/Behavioral:  Positive for memory loss.         Physical Exam  Temp:  [36.8 °C (98.3 °F)-37.2 °C (98.9 °F)] 37.2 °C (98.9 °F)  Pulse:  [52-55] 52  Resp:  [18] 18  BP: (101-157)/(48-83) 134/65  SpO2:  [90 %-93 %] 90 %    Physical Exam  Vitals reviewed.   Constitutional:       General: She is not in acute distress.     Appearance: Normal appearance. She is not ill-appearing.   HENT:      Head: Normocephalic and atraumatic.      Right Ear: External ear normal.      Left Ear: External ear normal.      Nose: Nose normal.      Mouth/Throat:      Pharynx: Oropharynx is clear.   Eyes:      General:         Right eye: No discharge.         Left eye: No discharge.      Extraocular Movements: Extraocular movements intact.      Conjunctiva/sclera: Conjunctivae normal.   Cardiovascular:      Rate and Rhythm: Normal rate and regular rhythm.   Pulmonary:      Effort: Pulmonary effort is normal. No respiratory distress.      Breath sounds: Normal breath sounds. No wheezing.   Abdominal:      General: Bowel sounds are normal. There is no distension.      Palpations: Abdomen is soft.      Tenderness: There is no abdominal tenderness.   Musculoskeletal:      Cervical back: Normal range of motion and neck supple.      Right  lower leg: No edema.      Left lower leg: Edema present.      Comments: Left Hip firmness/fullness   Skin:     General: Skin is warm and dry.   Neurological:      Mental Status: She is alert.      Comments: Awake and alert         Fluids    Intake/Output Summary (Last 24 hours) at 6/25/2023 1113  Last data filed at 6/24/2023 1833  Gross per 24 hour   Intake 320 ml   Output --   Net 320 ml       Laboratory  Recent Labs     06/25/23  0532   WBC 6.1   RBC 4.24   HEMOGLOBIN 12.9   HEMATOCRIT 39.5   MCV 93.2   MCH 30.4   MCHC 32.7   RDW 47.7   PLATELETCT 260   MPV 10.8     Recent Labs     06/25/23  0532   SODIUM 139   POTASSIUM 4.2   CHLORIDE 103   CO2 26   GLUCOSE 96   BUN 11   CREATININE 0.70   CALCIUM 9.3                 Assessment/Plan  * Left displaced femoral neck fracture (HCC)- (present on admission)  Assessment & Plan  2/2 mechanical GLF  S/P L Hip Hemiarthroplasty on 6/9/23 by Dr. Becker   mL  Wound care and pain control per Physiatry  Request U/S for left hip swelling    PVCs (premature ventricular contractions)  Assessment & Plan  Pt asymptomatic  Automated VS machine shows HR 40s  EKG HR 90 w/ bigeminy, no change from prior  Electrolytes normal    HTN (hypertension)  Assessment & Plan  Observe blood pressure trends on Lisinopril    Mild cognitive impairment- (present on admission)  Assessment & Plan  On Aricept       Full Code    Discussed w/ pt and Dr. Menard

## 2023-06-25 NOTE — CARE PLAN
Problem: Pain - Standard  Goal: Alleviation of pain or a reduction in pain to the patient’s comfort goal  Outcome: Progressing  Patient has verbalized understanding to use call light to request assistance with pain management.     Problem: Fall Risk - Rehab  Goal: Patient will remain free from falls  Outcome: Progressing  Patient has verbalized understanding to use call light to request assistance for toileting.    The patient is Watcher - Medium risk of patient condition declining or worsening    Shift Goals  Clinical Goals: Safety  Patient Goals: Rest  Family Goals: none present    Progress made toward(s) clinical / shift goals:  Progressing    Patient is not progressing towards the following goals:

## 2023-06-25 NOTE — CARE PLAN
Problem: Mobility Transfers  Goal: STG-Within one week, patient will perform bed mobility from flat bed with railing with contact guard assist  Outcome: Met     Problem: Mobility Transfers  Goal: STG-Within one week, patient will teach back fall recovery with minimal cuing (limited ability to actually perform due to posterior hip precautions)  Outcome: Discharged - Not Met  Note: Impaired cognition     Problem: PT-Long Term Goals  Goal: LTG-By discharge, patient will transfer one surface to another with FWW and supervision assist  Outcome: Discharged - Not Met  Note: Requires CGA-SBA for safety  Goal: LTG-By discharge, patient will perform home exercise program from handouts with set up assist  Outcome: Discharged - Not Met  Note: Impaired cognition  Goal: LTG-By discharge, patient will ambulate up/down flight of stairs with bilateral railing and supervision assist  Outcome: Discharged - Not Met  Note: Min A  Goal: LTG-By discharge, patient will transfer in/out of a car with supervision assist and FWW  Outcome: Discharged - Not Met  Note: Not formally assessed but anticipate CGA-SBA for safety  Goal: LTG-By discharge, patient will teach back hip precautions 100% with no cuing  Outcome: Discharged - Not Met  Note: Impaired cognition

## 2023-06-26 ENCOUNTER — APPOINTMENT (OUTPATIENT)
Dept: SPEECH THERAPY | Facility: REHABILITATION | Age: 88
End: 2023-06-26
Attending: PHYSICAL MEDICINE & REHABILITATION
Payer: MEDICARE

## 2023-06-26 VITALS
SYSTOLIC BLOOD PRESSURE: 96 MMHG | DIASTOLIC BLOOD PRESSURE: 70 MMHG | RESPIRATION RATE: 18 BRPM | TEMPERATURE: 97.4 F | HEIGHT: 63 IN | OXYGEN SATURATION: 92 % | WEIGHT: 115 LBS | BODY MASS INDEX: 20.38 KG/M2 | HEART RATE: 78 BPM

## 2023-06-26 PROCEDURE — 700102 HCHG RX REV CODE 250 W/ 637 OVERRIDE(OP): Performed by: PHYSICAL MEDICINE & REHABILITATION

## 2023-06-26 PROCEDURE — 97130 THER IVNTJ EA ADDL 15 MIN: CPT

## 2023-06-26 PROCEDURE — 99239 HOSP IP/OBS DSCHRG MGMT >30: CPT | Performed by: PHYSICAL MEDICINE & REHABILITATION

## 2023-06-26 PROCEDURE — 97129 THER IVNTJ 1ST 15 MIN: CPT

## 2023-06-26 PROCEDURE — 99232 SBSQ HOSP IP/OBS MODERATE 35: CPT | Performed by: HOSPITALIST

## 2023-06-26 PROCEDURE — A9270 NON-COVERED ITEM OR SERVICE: HCPCS | Performed by: PHYSICAL MEDICINE & REHABILITATION

## 2023-06-26 RX ORDER — LISINOPRIL 5 MG/1
2.5 TABLET ORAL DAILY
Status: DISCONTINUED | OUTPATIENT
Start: 2023-06-27 | End: 2023-06-26 | Stop reason: HOSPADM

## 2023-06-26 RX ORDER — DONEPEZIL HYDROCHLORIDE 5 MG/1
5 TABLET, FILM COATED ORAL EVERY EVENING
Qty: 30 TABLET | Refills: 2 | Status: SHIPPED | OUTPATIENT
Start: 2023-06-26

## 2023-06-26 RX ORDER — OXYCODONE HYDROCHLORIDE 5 MG/1
5 TABLET ORAL EVERY 4 HOURS PRN
Qty: 21 TABLET | Refills: 0 | Status: SHIPPED | OUTPATIENT
Start: 2023-06-26 | End: 2023-07-03

## 2023-06-26 RX ORDER — LISINOPRIL 2.5 MG/1
2.5 TABLET ORAL DAILY
Qty: 30 TABLET | Refills: 2 | Status: SHIPPED | OUTPATIENT
Start: 2023-06-27

## 2023-06-26 RX ADMIN — OMEPRAZOLE 20 MG: 20 CAPSULE, DELAYED RELEASE ORAL at 08:12

## 2023-06-26 RX ADMIN — OXYCODONE HYDROCHLORIDE 10 MG: 10 TABLET ORAL at 08:12

## 2023-06-26 ASSESSMENT — PATIENT HEALTH QUESTIONNAIRE - PHQ9
SUM OF ALL RESPONSES TO PHQ9 QUESTIONS 1 AND 2: 0
2. FEELING DOWN, DEPRESSED, IRRITABLE, OR HOPELESS: NOT AT ALL
1. LITTLE INTEREST OR PLEASURE IN DOING THINGS: NOT AT ALL
2. FEELING DOWN, DEPRESSED, IRRITABLE, OR HOPELESS: NOT AT ALL
1. LITTLE INTEREST OR PLEASURE IN DOING THINGS: NOT AT ALL
SUM OF ALL RESPONSES TO PHQ9 QUESTIONS 1 AND 2: 0

## 2023-06-26 ASSESSMENT — BRIEF INTERVIEW FOR MENTAL STATUS (BIMS)
ASKED TO RECALL BED: YES, NO CUE REQUIRED
WHAT DAY OF THE WEEK IS IT: CORRECT
INITIAL REPETITION OF BED BLUE SOCK - FIRST ATTEMPT: 3
ASKED TO RECALL BLUE: YES, AFTER CUEING (A COLOR")"
BIMS SUMMARY SCORE: 10
WHAT YEAR IS IT: CORRECT
WHAT MONTH IS IT: MISSED BY MORE THAN 1 MONTH
ASKED TO RECALL SOCK: NO, COULD NOT RECALL

## 2023-06-26 ASSESSMENT — ENCOUNTER SYMPTOMS
COUGH: 0
CHILLS: 0
EYES NEGATIVE: 1
MEMORY LOSS: 1
NAUSEA: 0
PALPITATIONS: 0
VOMITING: 0
ABDOMINAL PAIN: 0
BRUISES/BLEEDS EASILY: 0
FEVER: 0
POLYDIPSIA: 0
SHORTNESS OF BREATH: 0

## 2023-06-26 NOTE — CARE PLAN
Problem: Problem Solving STGs  Goal: STG-Within one week, patient will complete SCCAN with additional goals as appropriate.   Outcome: Met  Goal: STG-Within one week, patient will answer basic problem solving questions with 70% accuracy given mod verbal cues.    Outcome: Met

## 2023-06-26 NOTE — PROGRESS NOTES
Patient discharged to home per order.  Discharge instructions reviewed with patient and SO Shawn; they verbalize understanding and signed copies placed in chart.  Patient has all belongings; signed copy of form in chart.  Patient left facility at about 1440 via wheelchair accompanied by rehab staff.  Have enjoyed working with this pleasant patient.

## 2023-06-26 NOTE — DISCHARGE SUMMARY
Physical Medicine & Rehabilitation Discharge Summary    Admission Date: 6/12/2023    Discharge Date: 6/26/2023    Attending Provider: Doreen John MD/PhD    Admission Diagnosis:   Active Hospital Problems    Diagnosis     *Left displaced femoral neck fracture (HCC)     HTN (hypertension)     PVCs (premature ventricular contractions)     S/p left hip fracture     Leukocytosis     Mild cognitive impairment     Chronic back pain     Cervical spinal stenosis        Discharge Diagnosis:  Active Hospital Problems    Diagnosis     *Left displaced femoral neck fracture (HCC)     HTN (hypertension)     PVCs (premature ventricular contractions)     S/p left hip fracture     Leukocytosis     Mild cognitive impairment     Chronic back pain     Cervical spinal stenosis        HPI per Admission History & Physical:  The patient is a 87 y.o. female with a past medical history of hyperlipidemia, hypothyroidism, history of arthritis, possible baseline dementia;  who presented on 6/8/2023  8:43 AM with left hip pain after ground-level fall.  Per documentation, patient had tripped and fallen on her left side a couple of days prior to presenting to the emergency department.  Patient did not hit her head and had no loss of consciousness.  Upon evaluation in the emergency department, images were obtained that showed a left femoral neck fracture.  Orthopedic surgery was consulted, and patient was taken to the OR on 6/9 for open treatment of left displaced femoral neck fracture with hemiarthroplasty performed by Dr. Becker.  Postop, patient has posterior precautions and is WBAT LLE.  Postop, patient's leukocytosis has improved.  Her hemoglobin has remained stable.  She has mild hypotension but has been asymptomatic.  She has been hypoxic, requiring 1 L supplemental O2.  Is not on oxygen at home.    Patient was admitted to [facility] on 6/12/2023.     Hospital Course by Problem List:  Left hip fracture -  Patient with mechanical fall at home with left hip fracture s/p hemiarthroplasty on 6/9/23 with Dr. Becker. Patient is WBAT, posterior precautions.   -PT and OT for mobility and ADLs. Per guidelines, 15 hours per week between PT, OT and/or SLP.  -Follow-up Dr. Becker     HTN - Previously on Lisinopril but low SBP after fall. Will monitor. Into 110s, will monitor. Occasiona low 140s, will start 5 mg Lisinopril. Continue Lisinopril      Azotemia - Worsening after surgery. Check AM CMP - improved to 15     Hypothyroidism - Patient was on Levothyroxine in the past. Currently not on it. Will check TSH - wnl      Neuropathic pain - Patient with history of neuropathic pain from spinal stenosis. Previously on Gabapentin     Dysuria - Developed dysuria, UA + for UTI. Start Omnicef and send for cultures. PharmD recommending change to Macrobid. Ucx with E coli, completed macrobid. Resolved     Pain - Patient on Tylenol scheduled and PRN oxycodone. Discontinue Tylenol     Dementia - Does not appear to be on any medications. Will consult SLP. Start Aricept 5 mg QHS. Continue Aricept     Skin - Patient at risk for skin breakdown due to debility in areas including sacrum, achilles, elbows and head in addition to other sites. Nursing to assess skin daily.      GI Ppx - Patient on Prilosec for GERD prophylaxis. Patient on Senna-docusate for constipation prophylaxis.   -Severe constipation, mild distention, give Miralax. KUB on 6/13/23 - mild constipation in distal colon. Continue PPI and Miralax     DVT Ppx - Patient Lovenox on transfer.    Functional Status at Discharge  Eating:  Independent  Eating Description:  Increased time  Grooming:  Supervision, Seated  Grooming Description:  Supervision for safety, Verbal cueing  Bathing:  Standby Assist  Bathing Description:  Grab bar, Hand held shower, Set-up of equipment, Supervision for safety, Verbal cueing, Long handled bath tool  Upper Body Dressing:  Supervision  Upper Body  Dressing Description:  Set-up of equipment, Supervision for safety, Verbal cueing  Lower Body Dressing:  Standby Assist  Lower Body Dressing Description:  Reacher, Sock aid, Dressing stick, Increased time, Supervision for safety, Set-up of equipment, Verbal cueing  Discharge Location : Home  Patient Discharging with Assist of: Spouse / Significant Other  Level of Supervision Required: Intermittent Supervision  Recommended Equipment for Discharge: Front-Wheeled Walker;Grab Bars by Toilet;Grab Bars in Tub / Shower;Hand Held Shower Head;Sock Aid;Reacher;Long Handled Shoe Horn;Dressing Stick  Recommended Services Upon Discharge: Home Health Occupational Therapy  Long Term Goals Met: 0  Long Term Goals Not Met: 2  Reason(s) for Goals Not Met: dementia  Criteria for Termination of Services: Maximum Function Achieved for Inpatient Rehabilitation  Walk:  Standby Assist  Distance Walked:  80  Number of Times Distance Was Traveled:  1  Assistive Device:  Front Wheel Walker  Gait Deviation:  Antalgic, Decreased Base Of Support, Bradykinetic, Decreased Heel Strike, Decreased Toe Off  Wheelchair:     Distance Propelled:      Wheelchair Description:     Stairs Refused  Stairs Description Extra time, Hand rails, Safety concerns, Supervision for safety, Verbal cueing  Discharge Location: Home  Patient Discharging with Assist of: Spouse / Significant Other  Level of Supervision Required Upon Discharge: Twenty Four Hour Supervision  Recommended Equipment for Discharge: Front-Wheeled Walker;Reacher  Recommeded Services Upon Discharge: Home Health Physical Therapy  Long Term Goals Met: 0  Long Term Goals Not Met: 5  Reason(s) for Goals Not Met: Pt remains limited by impaired cognition d/t hx of dementia, requires CGA-SBA for all functional mobility at this time  Criteria for Termination of Services: Maximum Function Achieved for Inpatient Rehabilitation  Comprehension:  Minimal Assist  Comprehension Description:  Verbal cues,  Glasses  Expression:  Supervision  Expression Description:     Social Interaction:  Minimal Assist  Social Interaction Description:  Verbal cues, Schedule, Low stimulation environment, Increased time  Problem Solving:  Maximal Assist  Problem Solving Description:  Increased time, Therapy schedule, Verbal cueing, Supervision, Seat belt  Memory:  Maximal Assist  Memory Description:  Increased time, Therapy schedule, Verbal cueing  Progress since Admit: Pt demonstrated fair progress during tx.  Pt demonstrated increased accuracy in orientaion and attn to details.  This fluctuated occasionally throughout her stay.  Discharge Location : Home  Patient Discharging with Assist of: Family   Level of Supervision Required: 24 Hour Supervision  Recommended Services Upon Discharge: Outpatient Speech Therapy;Home Health Speech Therapy  Long Term Goals Met: 1  Long Term Goals Not Met: 0  Reason(s) for Goals Not Met: NA  Criteria for Termination of Services: Maximum Function Achieved for Inpatient Rehabilitation    IDoreen M.D., personally performed a complete drug regimen review and no potential clinically significant medication issues were identified.   Discharge Medication:     Medication List        START taking these medications        Instructions   donepezil 5 MG Tabs  Commonly known as: ARICEPT   Take 1 Tablet by mouth every evening.  Dose: 5 mg     lisinopril 2.5 MG Tabs  Start taking on: June 27, 2023  Commonly known as: PRINIVIL   Take 1 Tablet by mouth every day.  Dose: 2.5 mg            CONTINUE taking these medications        Instructions   acetaminophen 500 MG Tabs  Commonly known as: TYLENOL   Take 2 Tablets by mouth in the morning, at noon, and at bedtime.  Dose: 1,000 mg     oxyCODONE immediate-release 5 MG Tabs  Commonly known as: ROXICODONE   Take 1 Tablet by mouth every four hours as needed for Severe Pain for up to 7 days.  Dose: 5 mg     VITAMIN D PO   Take 1 Tablet by mouth every  day.  Dose: 1 Tablet            STOP taking these medications      enoxaparin 40 MG/0.4ML Sosy inj  Commonly known as: Lovenox     polyethylene glycol/lytes 17 g Pack  Commonly known as: MIRALAX     senna-docusate 8.6-50 MG Tabs  Commonly known as: PERICOLACE or SENOKOT S              Discharge Diet:  Current Diet Order   Procedures    Diet Order Diet: Regular       Discharge Activity:  As tolerated     Disposition:  Patient to discharge home with family support and community resources.    Equipment:  FWW    Follow-up & Discharge Instructions:  Follow up with your primary care provider (PCP) within 7-10 days of discharge to review your medications and take over your care.     If you develop chest pain, fever, chills, change in neurologic function (weakness, sensation changes, vision changes), or other concerning sxs, seek immediate medical attention or call 911.      No future appointments.    Condition on Discharge:  Good    More than 31 minutes was spent on discharging this patient, including face-to-face time, prescription management, and the dictation of this note.    Doreen John M.D.    Date of Service: 6/26/2023

## 2023-06-26 NOTE — DISCHARGE PLANNING
Case management  Reviewed signed copy of IMM and answered all questions.    Dc date /disposition: home today with home health.  Already has a walker per [facility].

## 2023-06-26 NOTE — PROGRESS NOTES
PATIENT DISCHARGE MEDICATION COUNSELING:  This patient received individualized medication counseling regarding the current regimen they are receiving in this facility. Potential adverse effects, monitoring parameters, and proper administration were covered in preparation for their discharge. The patient asked relevant questions regarding the medications for which answers were provided. Our pharmacy hours and phone number were provided for follow up questions should they arise.    Lucy Arthur, SairaD, BCPS

## 2023-06-26 NOTE — PROGRESS NOTES
Hospital Medicine Daily Progress Note      Chief Complaint  Bradycardia  Hypertension    Interval Problem Update  Blood pressures trending low; pt asymptomatic.  U/S results reviewed and discussed.    Review of Systems  Review of Systems   Constitutional:  Negative for chills and fever.   HENT: Negative.     Eyes: Negative.    Respiratory:  Negative for cough and shortness of breath.    Cardiovascular:  Negative for chest pain and palpitations.   Gastrointestinal:  Negative for abdominal pain, nausea and vomiting.   Genitourinary: Negative.    Musculoskeletal:  Positive for joint pain.   Skin:  Negative for itching and rash.   Endo/Heme/Allergies:  Negative for polydipsia. Does not bruise/bleed easily.   Psychiatric/Behavioral:  Positive for memory loss.         Physical Exam  Temp:  [36.3 °C (97.4 °F)-37.1 °C (98.8 °F)] 36.3 °C (97.4 °F)  Pulse:  [56-90] 78  Resp:  [16-18] 18  BP: ()/(53-70) 96/70  SpO2:  [92 %-94 %] 92 %    Physical Exam  Vitals reviewed.   Constitutional:       General: She is not in acute distress.     Appearance: Normal appearance. She is not ill-appearing.   HENT:      Head: Normocephalic and atraumatic.      Right Ear: External ear normal.      Left Ear: External ear normal.      Nose: Nose normal.      Mouth/Throat:      Pharynx: Oropharynx is clear.   Eyes:      General:         Right eye: No discharge.         Left eye: No discharge.      Extraocular Movements: Extraocular movements intact.      Conjunctiva/sclera: Conjunctivae normal.   Cardiovascular:      Rate and Rhythm: Normal rate and regular rhythm.   Pulmonary:      Effort: Pulmonary effort is normal. No respiratory distress.      Breath sounds: Normal breath sounds. No wheezing.   Abdominal:      General: Bowel sounds are normal. There is no distension.      Palpations: Abdomen is soft.      Tenderness: There is no abdominal tenderness.   Musculoskeletal:      Cervical back: Normal range of motion and neck supple.      Right  lower leg: No edema.      Left lower leg: Edema present.      Comments: Left Hip soha-incisional firmness/fullness, no induration   Skin:     General: Skin is warm and dry.   Neurological:      Mental Status: She is alert.      Comments: Awake and alert         Fluids    Intake/Output Summary (Last 24 hours) at 6/26/2023 1006  Last data filed at 6/26/2023 0900  Gross per 24 hour   Intake 880 ml   Output --   Net 880 ml       Laboratory  Recent Labs     06/25/23  0532   WBC 6.1   RBC 4.24   HEMOGLOBIN 12.9   HEMATOCRIT 39.5   MCV 93.2   MCH 30.4   MCHC 32.7   RDW 47.7   PLATELETCT 260   MPV 10.8     Recent Labs     06/25/23  0532   SODIUM 139   POTASSIUM 4.2   CHLORIDE 103   CO2 26   GLUCOSE 96   BUN 11   CREATININE 0.70   CALCIUM 9.3                 Assessment/Plan  * Left displaced femoral neck fracture (HCC)- (present on admission)  Assessment & Plan  2/2 mechanical GLF  S/P L Hip Hemiarthroplasty on 6/9/23 by Dr. Becker   mL  Wound care and pain control per Physiatry  U/S left hip shows fluid collection medial to incision most likely post-op seroma  Do not suspect infxn or hematoma given that pt is clinically doing well, afebrile, has normal WBC, and stable H/H    PVCs (premature ventricular contractions)  Assessment & Plan  Pt asymptomatic  Automated VS machine shows HR 40s  EKG HR 90 w/ bigeminy, no change from prior  Electrolytes normal    HTN (hypertension)  Assessment & Plan  Decrease Lisinopril for hypotension    Mild cognitive impairment- (present on admission)  Assessment & Plan  On Aricept       Full Code    Discussed w/ pt and Dr. John

## 2023-06-26 NOTE — CARE PLAN
"  Problem: Pain - Standard  Goal: Alleviation of pain or a reduction in pain to the patient’s comfort goal  Note: Medicated per request for hip pain, 8/10.Will continue to monitor and assess pain level and medicate as needed.     Problem: Fall Risk - Rehab  Goal: Patient will remain free from falls  Note: Brianna Brady Fall risk Assessment Score: 30    High fall risk Interventions   - Alarming seatbelt  - Bed and strip alarm   - Yellow sign by the door   - Yellow wrist band \"Fall risk\"  - Room near to the nurse station  - Do not leave patient unattended in the bathroom  - Fall risk education provided      Problem: Bowel Elimination  Goal: Patient will participate in bowel management program  Note: Pt refused scheduled senna at hs.Continent of bowel per report.LBM 6/25.Will continue to monitor.         "

## 2023-06-26 NOTE — THERAPY
"Speech Language Pathology  Daily Treatment     Patient Name: Chava Bray  Age:  87 y.o., Sex:  female  Medical Record #: [id number]  Today's Date: 6/26/2023     Precautions  Precautions: Fall Risk, Posterior Hip Precautions, Weight Bearing As Tolerated Left Lower Extremity  Comments: Hx Dementia, impaired safety    Subjective    Pt pleasant and cooperative during tx.       Objective       06/26/23 0731   Treatment Charges   SLP Cognitive Skill Development First 15 Minutes 1   SLP Cognitive Skill Development Additional 15 Minutes 1   SLP Total Time Spent   SLP Individual Total Time Spent (Mins) 30   Cognition   Orientation  Moderate (3)   Cognitive Pattern Assessment   Cognitive Pattern Assessment Used BIMS   Brief Interview for Mental Status (BIMS)   Repetition of Three Words (First Attempt) 3   Temporal Orientation: Year Correct   Temporal Orientation: Month Missed by more than 1 month   Temporal Orientation: Day Correct   Recall: \"Sock\" No, could not recall   Recall: \"Blue\" Yes, after cueing (\"a color\")   Recall: \"Bed\" Yes, no cue required   BIMS Summary Score 10   Confusion Assessment Method (CAM)   Is there evidence of an acute change in mental status from the patient's baseline? Yes   Inattention Behavior present, fluctuates (comes and goes, changes in severity)   Disorganized thinking Behavior present, fluctuates (comes and goes, changes in severity)   Altered level of consciousness Behavior not present   Discharge Summary    Progress since Admit Pt demonstrated fair progress during tx.  Pt demonstrated increased accuracy in orientaion and attn to details.  This fluctuated occasionally throughout her stay.   Discharge Location  Home   Patient Discharging with Assist of Family    Level of Supervision Required 24 Hour Supervision   Recommended Services Upon Discharge Outpatient Speech Therapy;Home Health Speech Therapy   Long Term Goals Met 1   Long Term Goals Not Met 0   Reason(s) for Goals Not Met NA " "  Criteria for Termination of Services Maximum Function Achieved for Inpatient Rehabilitation   Discharge Instructions   Diet Regular (7);Thin (0)   Supervision intermittent supervision during meals   Cognition / Communication Pt will need assistance with medication and financial managment.  Recommend use of calendar/written cues to aid in orientation and recall         Assessment    Pt scored 25/30 on the Orientation log (commensurate).  Pt scored 10/15 on the BIMS.  Pt benefits from verbal cues to aid in orientation.  Pt stated \"I didn't know where I was last night.  I was trying to get home.\"  Pt re-oriented by SLP.     Strengths: Supportive family  Barriers: Decreased endurance, Impaired functional cognition, Impaired insight/denial of deficits, Impaired carryover of learning, Uncooperative    Plan    Discharge scheduled today.            Speech Therapy Problems (Active)       Problem: Speech/Swallowing LTGs       Dates: Start:  06/16/23         Goal: LTG-By discharge, patient will solve basic problems Miki for safe discharge       Dates: Start:  06/16/23    Expected End:  06/30/23               "

## (undated) DEVICE — SUTURE GENERAL

## (undated) DEVICE — SUTURE 2-0 VICRYL PLUS CTX - 36 INCH (36/BX)

## (undated) DEVICE — KIT HIP PREP IM ENCHANCE TOTAL (5EA/BX)

## (undated) DEVICE — WATER IRRIGATION STERILE 1000ML (12EA/CA)

## (undated) DEVICE — HOOD, SURGICAL

## (undated) DEVICE — STOCKINETTE IMPERVIOUS 12X48 - STERILELF (10/CA)"

## (undated) DEVICE — MIXER BONE CEMENT REVOLUTION - W/FEMORAL PRESSURIZER (6/CA)

## (undated) DEVICE — LENS/HOOD FOR SPACESUIT - (32/PK) PEEL AWAY FACE

## (undated) DEVICE — BLADE SAGITTAL SAW DUAL CUT 25.0 X 90.0 X 1.27MM (1/EA)

## (undated) DEVICE — COVER LIGHT HANDLE ALC PLUS DISP (18EA/BX)

## (undated) DEVICE — SENSOR OXIMETER ADULT SPO2 RD SET (20EA/BX)

## (undated) DEVICE — DRAPE SURG STERI-DRAPE 7X11OD - (40EA/CA)

## (undated) DEVICE — SUTURE 0 VICRYL PLUS CTX - 36 INCH (36/BX)

## (undated) DEVICE — SODIUM CHL. IRRIGATION 0.9% 3000ML (4EA/CA 65CA/PF)

## (undated) DEVICE — GLOVE BIOGEL INDICATOR SZ 7.5 SURGICAL PF LTX - (50PR/BX 4BX/CA)

## (undated) DEVICE — BOVIE BLADE COATED - (50/PK)

## (undated) DEVICE — SUCTION INSTRUMENT YANKAUER BULBOUS TIP W/O VENT (50EA/CA)

## (undated) DEVICE — SET EXTENSION WITH 2 PORTS (48EA/CA) ***PART #2C8610 IS A SUBSTITUTE*****

## (undated) DEVICE — CANISTER SUCTION 3000ML MECHANICAL FILTER AUTO SHUTOFF MEDI-VAC NONSTERILE LF DISP  (40EA/CA)

## (undated) DEVICE — GOWN WARMING STANDARD FLEX - (30/CA)

## (undated) DEVICE — Device

## (undated) DEVICE — TIP INTPLS HFLO ML ORFC BTRY - (12/CS)  FOR SURGILAV

## (undated) DEVICE — SET LEADWIRE 5 LEAD BEDSIDE DISPOSABLE ECG (1SET OF 5/EA)

## (undated) DEVICE — GLOVE BIOGEL PI ORTHO SZ 7.5 PF LF (40PR/BX)

## (undated) DEVICE — PACK TOTAL HIP - (1/CA)

## (undated) DEVICE — SUTURE 5 TI-CRON HOS-14 - (36/BX)

## (undated) DEVICE — TUBING CLEARLINK DUO-VENT - C-FLO (48EA/CA)

## (undated) DEVICE — DRESSING POST OP BORDER 4 X 10 (5EA/BX)

## (undated) DEVICE — SODIUM CHL IRRIGATION 0.9% 1000ML (12EA/CA)

## (undated) DEVICE — HANDPIECE 10FT INTPLS SCT PLS IRRIGATION HAND CONTROL SET (6/PK)

## (undated) DEVICE — TOWEL STOP TIMEOUT SAFETY FLAG (40EA/CA)

## (undated) DEVICE — ELECTRODE DUAL RETURN W/ CORD - (50/PK)

## (undated) DEVICE — LACTATED RINGERS INJ 1000 ML - (14EA/CA 60CA/PF)

## (undated) DEVICE — DRAPE LARGE 3 QUARTER - (20/CA)

## (undated) DEVICE — DRAPE STRLE REG TOWEL 18X24 - (10/BX 4BX/CA)"

## (undated) DEVICE — SLEEVE, VASO, THIGH, MED